# Patient Record
Sex: FEMALE | Race: WHITE | Employment: OTHER | URBAN - METROPOLITAN AREA
[De-identification: names, ages, dates, MRNs, and addresses within clinical notes are randomized per-mention and may not be internally consistent; named-entity substitution may affect disease eponyms.]

---

## 2021-09-02 ENCOUNTER — APPOINTMENT (OUTPATIENT)
Dept: NON INVASIVE DIAGNOSTICS | Age: 71
DRG: 189 | End: 2021-09-02
Attending: EMERGENCY MEDICINE
Payer: MEDICARE

## 2021-09-02 ENCOUNTER — APPOINTMENT (OUTPATIENT)
Dept: NUCLEAR MEDICINE | Age: 71
DRG: 189 | End: 2021-09-02
Attending: EMERGENCY MEDICINE
Payer: MEDICARE

## 2021-09-02 ENCOUNTER — APPOINTMENT (OUTPATIENT)
Dept: CT IMAGING | Age: 71
DRG: 189 | End: 2021-09-02
Attending: EMERGENCY MEDICINE
Payer: MEDICARE

## 2021-09-02 ENCOUNTER — APPOINTMENT (OUTPATIENT)
Dept: GENERAL RADIOLOGY | Age: 71
DRG: 189 | End: 2021-09-02
Attending: EMERGENCY MEDICINE
Payer: MEDICARE

## 2021-09-02 ENCOUNTER — HOSPITAL ENCOUNTER (INPATIENT)
Age: 71
LOS: 6 days | Discharge: HOME HEALTH | DRG: 189 | End: 2021-09-08
Attending: EMERGENCY MEDICINE | Admitting: HOSPITALIST
Payer: MEDICARE

## 2021-09-02 DIAGNOSIS — J45.901 ACUTE EXACERBATION OF COPD WITH ASTHMA (HCC): Primary | ICD-10-CM

## 2021-09-02 DIAGNOSIS — J44.1 ACUTE EXACERBATION OF COPD WITH ASTHMA (HCC): Primary | ICD-10-CM

## 2021-09-02 DIAGNOSIS — R91.8 PULMONARY NODULES: ICD-10-CM

## 2021-09-02 DIAGNOSIS — E87.1 HYPONATREMIA: ICD-10-CM

## 2021-09-02 PROBLEM — Z72.0 TOBACCO USE: Status: ACTIVE | Noted: 2021-09-02

## 2021-09-02 PROBLEM — K80.20 CHOLELITHIASIS: Status: ACTIVE | Noted: 2021-09-02

## 2021-09-02 PROBLEM — J96.01 ACUTE RESPIRATORY FAILURE WITH HYPOXIA (HCC): Status: ACTIVE | Noted: 2021-09-02

## 2021-09-02 LAB
ALBUMIN SERPL-MCNC: 4.2 G/DL (ref 3.4–5)
ALBUMIN/GLOB SERPL: 1.1 {RATIO} (ref 0.8–1.7)
ALP SERPL-CCNC: 103 U/L (ref 45–117)
ALT SERPL-CCNC: 23 U/L (ref 13–56)
AMPHET UR QL SCN: NEGATIVE
ANION GAP SERPL CALC-SCNC: 10 MMOL/L (ref 3–18)
AST SERPL-CCNC: 30 U/L (ref 10–38)
ATRIAL RATE: 117 BPM
BARBITURATES UR QL SCN: NEGATIVE
BASE DEFICIT BLD-SCNC: 0.1 MMOL/L
BASOPHILS # BLD: 0.1 K/UL (ref 0–0.1)
BASOPHILS NFR BLD: 1 % (ref 0–2)
BDY SITE: ABNORMAL
BENZODIAZ UR QL: NEGATIVE
BILIRUB SERPL-MCNC: 0.7 MG/DL (ref 0.2–1)
BNP SERPL-MCNC: 1158 PG/ML (ref 0–900)
BUN SERPL-MCNC: 10 MG/DL (ref 7–18)
BUN/CREAT SERPL: 9 (ref 12–20)
CALCIUM SERPL-MCNC: 9.8 MG/DL (ref 8.5–10.1)
CALCULATED P AXIS, ECG09: 83 DEGREES
CALCULATED R AXIS, ECG10: 78 DEGREES
CALCULATED T AXIS, ECG11: 54 DEGREES
CANNABINOIDS UR QL SCN: NEGATIVE
CHLORIDE SERPL-SCNC: 87 MMOL/L (ref 100–111)
CK MB CFR SERPL CALC: 4.6 % (ref 0–4)
CK MB SERPL-MCNC: 8.7 NG/ML (ref 5–25)
CK SERPL-CCNC: 191 U/L (ref 26–192)
CO2 SERPL-SCNC: 27 MMOL/L (ref 21–32)
COCAINE UR QL SCN: NEGATIVE
COVID-19 RAPID TEST, COVR: NOT DETECTED
CREAT SERPL-MCNC: 1.11 MG/DL (ref 0.6–1.3)
D DIMER PPP FEU-MCNC: 0.89 UG/ML(FEU)
DIAGNOSIS, 93000: NORMAL
DIFFERENTIAL METHOD BLD: ABNORMAL
ECHO AV ANNULUS DIAM: 3.16 CM
ECHO AV AREA PEAK VELOCITY: 2.2 CM2
ECHO AV AREA VTI: 2.5 CM2
ECHO AV AREA/BSA PEAK VELOCITY: 1.2 CM2/M2
ECHO AV AREA/BSA VTI: 1.4 CM2/M2
ECHO AV MEAN GRADIENT: 11.84 MMHG
ECHO AV PEAK GRADIENT: 22.35 MMHG
ECHO AV PEAK VELOCITY: 235 CM/S
ECHO AV VTI: 37.98 CM
ECHO IVC PROX: 1.7 CM
ECHO LA VOL 2C: 45.67 ML (ref 22–52)
ECHO LA VOL 4C: 17.98 ML (ref 22–52)
ECHO LA VOL BP: 33.6 ML (ref 22–52)
ECHO LV EDV A2C: 33.35 ML
ECHO LV EDV A4C: 56.08 ML
ECHO LV EDV BP: 43.47 ML (ref 56–104)
ECHO LV EJECTION FRACTION A2C: 47 %
ECHO LV EJECTION FRACTION A4C: 73 %
ECHO LV EJECTION FRACTION BIPLANE: 64 % (ref 55–100)
ECHO LV ESV A2C: 17.71 ML
ECHO LV ESV A4C: 14.89 ML
ECHO LV ESV BP: 15.65 ML (ref 19–49)
ECHO LV INTERNAL DIMENSION DIASTOLIC: 3.33 CM (ref 3.9–5.3)
ECHO LV INTERNAL DIMENSION SYSTOLIC: 2.36 CM
ECHO LV IVSD: 1.27 CM (ref 0.6–0.9)
ECHO LV MASS 2D: 96.6 G (ref 67–162)
ECHO LV MASS INDEX 2D: 52.5 G/M2 (ref 43–95)
ECHO LV POSTERIOR WALL DIASTOLIC: 0.74 CM (ref 0.6–0.9)
ECHO LVOT CARDIAC OUTPUT: 11.62 L/MIN
ECHO LVOT DIAM: 1.97 CM
ECHO LVOT PEAK GRADIENT: 11.63 MMHG
ECHO LVOT PEAK VELOCITY: 171 CM/S
ECHO LVOT SV: 15.7 ML
ECHO LVOT SV: 94.8 ML
ECHO LVOT VTI: 31.19 CM
ECHO RA AREA 4C: 7.71 CM2
ECHO RV INTERNAL DIMENSION: 2.48 CM
EOSINOPHIL # BLD: 0.8 K/UL (ref 0–0.4)
EOSINOPHIL NFR BLD: 12 % (ref 0–5)
ERYTHROCYTE [DISTWIDTH] IN BLOOD BY AUTOMATED COUNT: 11.9 % (ref 11.6–14.5)
GAS FLOW.O2 O2 DELIVERY SYS: ABNORMAL L/MIN
GLOBULIN SER CALC-MCNC: 3.9 G/DL (ref 2–4)
GLUCOSE SERPL-MCNC: 119 MG/DL (ref 74–99)
HCO3 BLD-SCNC: 24.1 MMOL/L (ref 22–26)
HCT VFR BLD AUTO: 36.3 % (ref 35–45)
HDSCOM,HDSCOM: NORMAL
HGB BLD-MCNC: 13.1 G/DL (ref 12–16)
LACTATE BLD-SCNC: 1.53 MMOL/L (ref 0.4–2)
LVOT MG: 7.17 MMHG
LYMPHOCYTES # BLD: 1.5 K/UL (ref 0.9–3.6)
LYMPHOCYTES NFR BLD: 23 % (ref 21–52)
MAGNESIUM SERPL-MCNC: 1.7 MG/DL (ref 1.6–2.6)
MCH RBC QN AUTO: 32.3 PG (ref 24–34)
MCHC RBC AUTO-ENTMCNC: 36.1 G/DL (ref 31–37)
MCV RBC AUTO: 89.6 FL (ref 78–100)
METHADONE UR QL: NEGATIVE
MONOCYTES # BLD: 0.4 K/UL (ref 0.05–1.2)
MONOCYTES NFR BLD: 7 % (ref 3–10)
NEUTS SEG # BLD: 3.7 K/UL (ref 1.8–8)
NEUTS SEG NFR BLD: 57 % (ref 40–73)
OPIATES UR QL: NEGATIVE
P-R INTERVAL, ECG05: 130 MS
PCO2 BLD: 37 MMHG (ref 35–45)
PCP UR QL: NEGATIVE
PH BLD: 7.42 [PH] (ref 7.35–7.45)
PLATELET # BLD AUTO: 214 K/UL (ref 135–420)
PMV BLD AUTO: 11.1 FL (ref 9.2–11.8)
PO2 BLD: 70 MMHG (ref 80–100)
POTASSIUM SERPL-SCNC: 3.9 MMOL/L (ref 3.5–5.5)
PROT SERPL-MCNC: 8.1 G/DL (ref 6.4–8.2)
Q-T INTERVAL, ECG07: 346 MS
QRS DURATION, ECG06: 116 MS
QTC CALCULATION (BEZET), ECG08: 482 MS
RBC # BLD AUTO: 4.05 M/UL (ref 4.2–5.3)
SAO2 % BLD: 94.2 % (ref 92–97)
SERVICE CMNT-IMP: ABNORMAL
SODIUM SERPL-SCNC: 124 MMOL/L (ref 136–145)
SOURCE, COVRS: NORMAL
SPECIMEN TYPE: ABNORMAL
TROPONIN I SERPL-MCNC: <0.02 NG/ML (ref 0–0.04)
VENTRICULAR RATE, ECG03: 117 BPM
WBC # BLD AUTO: 6.5 K/UL (ref 4.6–13.2)

## 2021-09-02 PROCEDURE — 82553 CREATINE MB FRACTION: CPT

## 2021-09-02 PROCEDURE — 80307 DRUG TEST PRSMV CHEM ANLYZR: CPT

## 2021-09-02 PROCEDURE — 96375 TX/PRO/DX INJ NEW DRUG ADDON: CPT

## 2021-09-02 PROCEDURE — 96374 THER/PROPH/DIAG INJ IV PUSH: CPT

## 2021-09-02 PROCEDURE — 71045 X-RAY EXAM CHEST 1 VIEW: CPT

## 2021-09-02 PROCEDURE — 87635 SARS-COV-2 COVID-19 AMP PRB: CPT

## 2021-09-02 PROCEDURE — C8929 TTE W OR WO FOL WCON,DOPPLER: HCPCS

## 2021-09-02 PROCEDURE — 80053 COMPREHEN METABOLIC PANEL: CPT

## 2021-09-02 PROCEDURE — 85025 COMPLETE CBC W/AUTO DIFF WBC: CPT

## 2021-09-02 PROCEDURE — 74011000250 HC RX REV CODE- 250: Performed by: HOSPITALIST

## 2021-09-02 PROCEDURE — 74011000258 HC RX REV CODE- 258: Performed by: EMERGENCY MEDICINE

## 2021-09-02 PROCEDURE — 94640 AIRWAY INHALATION TREATMENT: CPT

## 2021-09-02 PROCEDURE — 99285 EMERGENCY DEPT VISIT HI MDM: CPT

## 2021-09-02 PROCEDURE — 74011250637 HC RX REV CODE- 250/637: Performed by: EMERGENCY MEDICINE

## 2021-09-02 PROCEDURE — 85379 FIBRIN DEGRADATION QUANT: CPT

## 2021-09-02 PROCEDURE — 36600 WITHDRAWAL OF ARTERIAL BLOOD: CPT

## 2021-09-02 PROCEDURE — 71250 CT THORAX DX C-: CPT

## 2021-09-02 PROCEDURE — 96366 THER/PROPH/DIAG IV INF ADDON: CPT

## 2021-09-02 PROCEDURE — 93005 ELECTROCARDIOGRAM TRACING: CPT

## 2021-09-02 PROCEDURE — 87040 BLOOD CULTURE FOR BACTERIA: CPT

## 2021-09-02 PROCEDURE — 83735 ASSAY OF MAGNESIUM: CPT

## 2021-09-02 PROCEDURE — 96365 THER/PROPH/DIAG IV INF INIT: CPT

## 2021-09-02 PROCEDURE — 74011250636 HC RX REV CODE- 250/636: Performed by: HOSPITALIST

## 2021-09-02 PROCEDURE — 74011000250 HC RX REV CODE- 250: Performed by: EMERGENCY MEDICINE

## 2021-09-02 PROCEDURE — 74011250637 HC RX REV CODE- 250/637: Performed by: FAMILY MEDICINE

## 2021-09-02 PROCEDURE — 83605 ASSAY OF LACTIC ACID: CPT

## 2021-09-02 PROCEDURE — 74011250636 HC RX REV CODE- 250/636: Performed by: EMERGENCY MEDICINE

## 2021-09-02 PROCEDURE — 74011000636 HC RX REV CODE- 636: Performed by: EMERGENCY MEDICINE

## 2021-09-02 PROCEDURE — 74011250637 HC RX REV CODE- 250/637: Performed by: INTERNAL MEDICINE

## 2021-09-02 PROCEDURE — 65660000000 HC RM CCU STEPDOWN

## 2021-09-02 PROCEDURE — 96367 TX/PROPH/DG ADDL SEQ IV INF: CPT

## 2021-09-02 PROCEDURE — 94762 N-INVAS EAR/PLS OXIMTRY CONT: CPT

## 2021-09-02 PROCEDURE — A9540 TC99M MAA: HCPCS

## 2021-09-02 PROCEDURE — 82803 BLOOD GASES ANY COMBINATION: CPT

## 2021-09-02 PROCEDURE — 83880 ASSAY OF NATRIURETIC PEPTIDE: CPT

## 2021-09-02 PROCEDURE — 74011250637 HC RX REV CODE- 250/637: Performed by: HOSPITALIST

## 2021-09-02 PROCEDURE — 84300 ASSAY OF URINE SODIUM: CPT

## 2021-09-02 RX ORDER — BUDESONIDE 0.5 MG/2ML
500 INHALANT ORAL
Status: DISCONTINUED | OUTPATIENT
Start: 2021-09-02 | End: 2021-09-08 | Stop reason: HOSPADM

## 2021-09-02 RX ORDER — SODIUM CHLORIDE 0.9 % (FLUSH) 0.9 %
5-40 SYRINGE (ML) INJECTION AS NEEDED
Status: DISCONTINUED | OUTPATIENT
Start: 2021-09-02 | End: 2021-09-08 | Stop reason: HOSPADM

## 2021-09-02 RX ORDER — MAGNESIUM SULFATE HEPTAHYDRATE 40 MG/ML
2 INJECTION, SOLUTION INTRAVENOUS ONCE
Status: COMPLETED | OUTPATIENT
Start: 2021-09-02 | End: 2021-09-02

## 2021-09-02 RX ORDER — ACETAMINOPHEN 325 MG/1
650 TABLET ORAL
Status: DISCONTINUED | OUTPATIENT
Start: 2021-09-02 | End: 2021-09-08 | Stop reason: HOSPADM

## 2021-09-02 RX ORDER — LEVALBUTEROL INHALATION SOLUTION 0.63 MG/3ML
0.63 SOLUTION RESPIRATORY (INHALATION)
Status: DISCONTINUED | OUTPATIENT
Start: 2021-09-02 | End: 2021-09-08 | Stop reason: HOSPADM

## 2021-09-02 RX ORDER — IPRATROPIUM BROMIDE AND ALBUTEROL SULFATE 2.5; .5 MG/3ML; MG/3ML
3 SOLUTION RESPIRATORY (INHALATION)
Status: DISCONTINUED | OUTPATIENT
Start: 2021-09-02 | End: 2021-09-02

## 2021-09-02 RX ORDER — SODIUM CHLORIDE 9 MG/ML
75 INJECTION, SOLUTION INTRAVENOUS CONTINUOUS
Status: DISCONTINUED | OUTPATIENT
Start: 2021-09-02 | End: 2021-09-06

## 2021-09-02 RX ORDER — GUAIFENESIN 600 MG/1
600 TABLET, EXTENDED RELEASE ORAL EVERY 12 HOURS
Status: DISCONTINUED | OUTPATIENT
Start: 2021-09-02 | End: 2021-09-08 | Stop reason: HOSPADM

## 2021-09-02 RX ORDER — ENOXAPARIN SODIUM 100 MG/ML
40 INJECTION SUBCUTANEOUS EVERY 24 HOURS
Status: DISCONTINUED | OUTPATIENT
Start: 2021-09-02 | End: 2021-09-08 | Stop reason: HOSPADM

## 2021-09-02 RX ORDER — GUAIFENESIN/DEXTROMETHORPHAN 100-10MG/5
10 SYRUP ORAL
Status: COMPLETED | OUTPATIENT
Start: 2021-09-02 | End: 2021-09-02

## 2021-09-02 RX ORDER — IPRATROPIUM BROMIDE AND ALBUTEROL SULFATE 2.5; .5 MG/3ML; MG/3ML
6 SOLUTION RESPIRATORY (INHALATION)
Status: COMPLETED | OUTPATIENT
Start: 2021-09-02 | End: 2021-09-02

## 2021-09-02 RX ORDER — BENZONATATE 100 MG/1
200 CAPSULE ORAL
Status: COMPLETED | OUTPATIENT
Start: 2021-09-02 | End: 2021-09-02

## 2021-09-02 RX ORDER — SODIUM CHLORIDE 0.9 % (FLUSH) 0.9 %
5-40 SYRINGE (ML) INJECTION EVERY 8 HOURS
Status: DISCONTINUED | OUTPATIENT
Start: 2021-09-02 | End: 2021-09-08 | Stop reason: HOSPADM

## 2021-09-02 RX ORDER — IPRATROPIUM BROMIDE AND ALBUTEROL SULFATE 2.5; .5 MG/3ML; MG/3ML
3 SOLUTION RESPIRATORY (INHALATION)
Status: DISCONTINUED | OUTPATIENT
Start: 2021-09-02 | End: 2021-09-08 | Stop reason: HOSPADM

## 2021-09-02 RX ORDER — DILTIAZEM HYDROCHLORIDE 120 MG/1
120 CAPSULE, COATED, EXTENDED RELEASE ORAL DAILY
Status: DISCONTINUED | OUTPATIENT
Start: 2021-09-02 | End: 2021-09-05

## 2021-09-02 RX ORDER — SODIUM CHLORIDE, SODIUM LACTATE, POTASSIUM CHLORIDE, CALCIUM CHLORIDE 600; 310; 30; 20 MG/100ML; MG/100ML; MG/100ML; MG/100ML
200 INJECTION, SOLUTION INTRAVENOUS CONTINUOUS
Status: DISCONTINUED | OUTPATIENT
Start: 2021-09-02 | End: 2021-09-02

## 2021-09-02 RX ORDER — DIPHENHYDRAMINE HCL 25 MG
25 CAPSULE ORAL
Status: DISCONTINUED | OUTPATIENT
Start: 2021-09-02 | End: 2021-09-08 | Stop reason: HOSPADM

## 2021-09-02 RX ORDER — GUAIFENESIN 600 MG/1
600 TABLET, EXTENDED RELEASE ORAL
Status: COMPLETED | OUTPATIENT
Start: 2021-09-02 | End: 2021-09-02

## 2021-09-02 RX ORDER — ARFORMOTEROL TARTRATE 15 UG/2ML
15 SOLUTION RESPIRATORY (INHALATION)
Status: DISCONTINUED | OUTPATIENT
Start: 2021-09-02 | End: 2021-09-08 | Stop reason: HOSPADM

## 2021-09-02 RX ADMIN — BENZONATATE 200 MG: 100 CAPSULE ORAL at 11:05

## 2021-09-02 RX ADMIN — DOXYCYCLINE 100 MG: 100 INJECTION, POWDER, LYOPHILIZED, FOR SOLUTION INTRAVENOUS at 14:38

## 2021-09-02 RX ADMIN — SODIUM CHLORIDE 100 ML/HR: 900 INJECTION, SOLUTION INTRAVENOUS at 23:00

## 2021-09-02 RX ADMIN — ENOXAPARIN SODIUM 40 MG: 40 INJECTION SUBCUTANEOUS at 19:21

## 2021-09-02 RX ADMIN — BUDESONIDE 500 MCG: 0.5 INHALANT RESPIRATORY (INHALATION) at 20:33

## 2021-09-02 RX ADMIN — MAGNESIUM SULFATE HEPTAHYDRATE 2 G: 40 INJECTION, SOLUTION INTRAVENOUS at 09:46

## 2021-09-02 RX ADMIN — ACETAMINOPHEN 650 MG: 325 TABLET ORAL at 23:00

## 2021-09-02 RX ADMIN — CEFTRIAXONE 1 G: 1 INJECTION, POWDER, FOR SOLUTION INTRAMUSCULAR; INTRAVENOUS at 14:42

## 2021-09-02 RX ADMIN — DILTIAZEM HYDROCHLORIDE 120 MG: 120 CAPSULE, COATED, EXTENDED RELEASE ORAL at 19:20

## 2021-09-02 RX ADMIN — ARFORMOTEROL TARTRATE 15 MCG: 15 SOLUTION RESPIRATORY (INHALATION) at 20:10

## 2021-09-02 RX ADMIN — PERFLUTREN 1 ML: 6.52 INJECTION, SUSPENSION INTRAVENOUS at 17:23

## 2021-09-02 RX ADMIN — GUAIFENESIN 600 MG: 600 TABLET, EXTENDED RELEASE ORAL at 11:05

## 2021-09-02 RX ADMIN — SODIUM CHLORIDE, SODIUM LACTATE, POTASSIUM CHLORIDE, AND CALCIUM CHLORIDE 200 ML/HR: 600; 310; 30; 20 INJECTION, SOLUTION INTRAVENOUS at 17:14

## 2021-09-02 RX ADMIN — IPRATROPIUM BROMIDE AND ALBUTEROL SULFATE 6 ML: .5; 3 SOLUTION RESPIRATORY (INHALATION) at 09:27

## 2021-09-02 RX ADMIN — METHYLPREDNISOLONE SODIUM SUCCINATE 60 MG: 125 INJECTION, POWDER, FOR SOLUTION INTRAMUSCULAR; INTRAVENOUS at 20:10

## 2021-09-02 RX ADMIN — IPRATROPIUM BROMIDE AND ALBUTEROL SULFATE 3 ML: .5; 3 SOLUTION RESPIRATORY (INHALATION) at 13:29

## 2021-09-02 RX ADMIN — Medication 10 ML: at 23:01

## 2021-09-02 RX ADMIN — DIPHENHYDRAMINE HYDROCHLORIDE 25 MG: 25 CAPSULE ORAL at 23:00

## 2021-09-02 RX ADMIN — GUAIFENESIN AND DEXTROMETHORPHAN 10 ML: 100; 10 SYRUP ORAL at 12:18

## 2021-09-02 RX ADMIN — SODIUM CHLORIDE 1000 ML: 900 INJECTION, SOLUTION INTRAVENOUS at 10:14

## 2021-09-02 RX ADMIN — GUAIFENESIN 600 MG: 600 TABLET, EXTENDED RELEASE ORAL at 20:10

## 2021-09-02 NOTE — ED TRIAGE NOTES
Pt arrived via ems with c/o difficulty breathing since yesterday. Hx copd and asthma. Per ems pt received x2 albuterol neb tx and x1 atrovent neb tx. IV placed by ems to left ac. Wheezing on inspiration and expiration.  MD at bedside during triage and respiratory at the bedside

## 2021-09-02 NOTE — ED PROVIDER NOTES
[address]  EMERGENCY DEPARTMENT HISTORY AND PHYSICAL EXAM    9:08 AM    Date: 9/2/2021  Patient Name: Silver Tracy    History of Presenting Illness     Chief Complaint   Patient presents with    Breathing Problem     hx copd and asthma        History Provided By: Patient and Patient's Son  Location/Duration/Severity/Modifying factors   Patient is a 51-year-old female who presents to the emergency department with a chief complaint of shortness of breath. Reports that it started yesterday mostly gradual in onset. Progressed during the day and she called EMS. In route she received two albuterol and one Atrovent treatment, she received 125 mg of Solu-Medrol. She reports history of COPD and asthma. Denies any known sick contacts, has received both Covid vaccinations per for the full series. Patient found to be quite wheezy in route, per EMS was around 90% on their initial arrival to see her. She reports he is also had a nonproductive cough. Patient is in moderate respiratory distress, so history is somewhat limited based on that reason. She is not having any chest pain that she endorses denies any history of pulmonary embolism or any heart problems.           PCP: Tabby Tovar MD    Current Facility-Administered Medications   Medication Dose Route Frequency Provider Last Rate Last Admin    albuterol-ipratropium (DUO-NEB) 2.5 MG-0.5 MG/3 ML  3 mL Nebulization Q15MIN PRN Armando Dominguez K, DO   3 mL at 09/02/21 1329    cefTRIAXone (ROCEPHIN) 1 g in sterile water (preservative free) 10 mL IV syringe  1 g IntraVENous Q24H Armando Dominguez K, DO   1 g at 09/02/21 1442    sodium chloride (NS) flush 5-40 mL  5-40 mL IntraVENous Q8H Sultana Franklin MD   10 mL at 09/03/21 0512    sodium chloride (NS) flush 5-40 mL  5-40 mL IntraVENous PRN Fam Hammond MD        0.9% sodium chloride infusion  100 mL/hr IntraVENous CONTINUOUS Krysta CANTRELL  mL/hr at 09/02/21 2300 100 mL/hr at 09/02/21 2300    budesonide (PULMICORT) 500 mcg/2 ml nebulizer suspension  500 mcg Nebulization BID RT Esthela CANTRELL MD   500 mcg at 09/03/21 0753    methylPREDNISolone (PF) (Solu-MEDROL) injection 60 mg  60 mg IntraVENous Q8H Sultana Franklin MD   60 mg at 09/03/21 0512    enoxaparin (LOVENOX) injection 40 mg  40 mg SubCUTAneous Q24H Esthela CANTRELL MD   40 mg at 09/02/21 1921    arformoteroL (BROVANA) neb solution 15 mcg  15 mcg Nebulization BID RT Esthela CANTRELL MD   15 mcg at 09/03/21 0753    guaiFENesin ER (MUCINEX) tablet 600 mg  600 mg Oral Q12H AmSultana márquez MD   600 mg at 09/02/21 2010    levalbuterol (XOPENEX) nebulizer soln 0.63 mg/3 mL  0.63 mg Nebulization Q4H PRN Tg Bright MD        dilTIAZem ER (CARDIZEM CD) capsule 120 mg  120 mg Oral DAILY Jameel Rene MD   120 mg at 09/02/21 1920    acetaminophen (TYLENOL) tablet 650 mg  650 mg Oral Q6H PRN Cristal De MD   650 mg at 09/02/21 2300    diphenhydrAMINE (BENADRYL) capsule 25 mg  25 mg Oral QHS PRN Cristal De MD   25 mg at 09/02/21 2300       Past History     Past Medical History:  Past Medical History:   Diagnosis Date    Asthma     Chronic obstructive pulmonary disease ([facility] [address]     Hypertension        Past Surgical History:  History reviewed. No pertinent surgical history. Family History:  History reviewed. No pertinent family history. Social History:  Social History     Tobacco Use    Smoking status: Current Every Day Smoker     Packs/day: 0.50    Smokeless tobacco: Never Used   Substance Use Topics    Alcohol use: Not Currently    Drug use: Never       Allergies: Allergies   Allergen Reactions    Codeine Rash    Demerol [Meperidine] Rash    Sulfa (Sulfonamide Antibiotics) Rash       I reviewed and confirmed the above information with patient and updated as necessary. Review of Systems     Review of Systems   Constitutional: Negative for chills and fever.    HENT: Negative for congestion, rhinorrhea, sinus pressure and sneezing. Eyes: Negative for visual disturbance. Respiratory: Positive for cough, shortness of breath and wheezing. Cardiovascular: Negative for chest pain. Gastrointestinal: Negative for abdominal pain, diarrhea, nausea and vomiting. Genitourinary: Negative for dysuria, frequency and urgency. Musculoskeletal: Negative for back pain and neck pain. Skin: Negative for rash. Neurological: Negative for syncope, numbness and headaches. Physical Exam     Visit Vitals  BP (!) 140/69   Pulse 93   Temp 97.4 °F (36.3 °C)   Resp 20   Ht 5' 7\" (1.702 m)   Wt 72.6 kg (160 lb)   SpO2 100%   BMI 25.06 kg/m²       Physical Exam  Vitals and nursing note reviewed. Constitutional:       General: She is in acute distress. Appearance: Normal appearance. She is normal weight. HENT:      Head: Normocephalic and atraumatic. Right Ear: External ear normal.      Left Ear: External ear normal.      Nose: Nose normal.      Mouth/Throat:      Mouth: Mucous membranes are moist.   Eyes:      Conjunctiva/sclera: Conjunctivae normal.   Cardiovascular:      Rate and Rhythm: Regular rhythm. Tachycardia present. Pulses: Normal pulses. Heart sounds: Normal heart sounds. No murmur heard. Pulmonary:      Effort: Accessory muscle usage, prolonged expiration and respiratory distress (Mild to moderate) present. Breath sounds: Decreased air movement present. Examination of the right-upper field reveals wheezing. Examination of the left-upper field reveals wheezing. Examination of the right-middle field reveals wheezing. Examination of the left-middle field reveals wheezing. Examination of the right-lower field reveals decreased breath sounds and wheezing. Examination of the left-lower field reveals decreased breath sounds and wheezing. Decreased breath sounds and wheezing present. No rhonchi or rales. Abdominal:      General: Abdomen is flat. Tenderness: There is no abdominal tenderness. There is no guarding or rebound. Musculoskeletal:         General: No swelling or tenderness. Normal range of motion. Cervical back: Normal range of motion and neck supple. Right lower leg: No edema. Left lower leg: No edema. Skin:     General: Skin is warm and dry. Capillary Refill: Capillary refill takes less than 2 seconds. Findings: No rash. Neurological:      General: No focal deficit present. Mental Status: She is alert.          Diagnostic Study Results     Labs -  Recent Results (from the past 24 hour(s))   BLOOD GAS, ARTERIAL POC    Collection Time: 09/02/21  9:13 AM   Result Value Ref Range    Device: ROOM AIR      pH (POC) 7.42 7.35 - 7.45      pCO2 (POC) 37.0 35.0 - 45.0 MMHG    pO2 (POC) 70 (L) 80 - 100 MMHG    HCO3 (POC) 24.1 22 - 26 MMOL/L    sO2 (POC) 94.2 92 - 97 %    Base deficit (POC) 0.1 mmol/L    Site LEFT RADIAL      Specimen type (POC) ARTERIAL      Performed by Dylon Hall    COVID-19 RAPID TEST    Collection Time: 09/02/21  9:15 AM   Result Value Ref Range    Specimen source Nasopharyngeal      COVID-19 rapid test Not detected NOTD     EKG, 12 LEAD, INITIAL    Collection Time: 09/02/21  9:16 AM   Result Value Ref Range    Ventricular Rate 117 BPM    Atrial Rate 117 BPM    P-R Interval 130 ms    QRS Duration 116 ms    Q-T Interval 346 ms    QTC Calculation (Bezet) 482 ms    Calculated P Axis 83 degrees    Calculated R Axis 78 degrees    Calculated T Axis 54 degrees    Diagnosis       Sinus tachycardia with occasional premature ventricular complexes  Possible Left atrial enlargement  Incomplete right bundle branch block  Cannot exclude Lateral infarct , age undetermined  Abnormal ECG  No previous ECGs available  Confirmed by Patito Matson MD. ([id number]) on 9/2/2021 9:58:43 PM     CBC WITH AUTOMATED DIFF    Collection Time: 09/02/21  9:33 AM   Result Value Ref Range    WBC 6.5 4.6 - 13.2 K/uL    RBC 4.05 (L) 4.20 - 5.30 M/uL    HGB 13.1 12.0 - 16.0 g/dL    HCT 36.3 35.0 - 45.0 %    MCV 89.6 78.0 - 100.0 FL    MCH 32.3 24.0 - 34.0 PG    MCHC 36.1 31.0 - 37.0 g/dL    RDW 11.9 11.6 - 14.5 %    PLATELET 991 011 - 033 K/uL    MPV 11.1 9.2 - 11.8 FL    NEUTROPHILS 57 40 - 73 %    LYMPHOCYTES 23 21 - 52 %    MONOCYTES 7 3 - 10 %    EOSINOPHILS 12 (H) 0 - 5 %    BASOPHILS 1 0 - 2 %    ABS. NEUTROPHILS 3.7 1.8 - 8.0 K/UL    ABS. LYMPHOCYTES 1.5 0.9 - 3.6 K/UL    ABS. MONOCYTES 0.4 0.05 - 1.2 K/UL    ABS. EOSINOPHILS 0.8 (H) 0.0 - 0.4 K/UL    ABS. BASOPHILS 0.1 0.0 - 0.1 K/UL    DF AUTOMATED     METABOLIC PANEL, COMPREHENSIVE    Collection Time: 09/02/21  9:33 AM   Result Value Ref Range    Sodium 124 (L) 136 - 145 mmol/L    Potassium 3.9 3.5 - 5.5 mmol/L    Chloride 87 (L) 100 - 111 mmol/L    CO2 27 21 - 32 mmol/L    Anion gap 10 3.0 - 18 mmol/L    Glucose 119 (H) 74 - 99 mg/dL    BUN 10 7.0 - 18 MG/DL    Creatinine 1.11 0.6 - 1.3 MG/DL    BUN/Creatinine ratio 9 (L) 12 - 20      GFR est AA 59 (L) >60 ml/min/1.73m2    GFR est non-AA 49 (L) >60 ml/min/1.73m2    Calcium 9.8 8.5 - 10.1 MG/DL    Bilirubin, total 0.7 0.2 - 1.0 MG/DL    ALT (SGPT) 23 13 - 56 U/L    AST (SGOT) 30 10 - 38 U/L    Alk.  phosphatase 103 45 - 117 U/L    Protein, total 8.1 6.4 - 8.2 g/dL    Albumin 4.2 3.4 - 5.0 g/dL    Globulin 3.9 2.0 - 4.0 g/dL    A-G Ratio 1.1 0.8 - 1.7     NT-PRO BNP    Collection Time: 09/02/21  9:33 AM   Result Value Ref Range    NT pro-BNP 1,158 (H) 0 - 900 PG/ML   CARDIAC PANEL,(CK, CKMB & TROPONIN)    Collection Time: 09/02/21  9:33 AM   Result Value Ref Range    CK - MB 8.7 (H) <3.6 ng/ml    CK-MB Index 4.6 (H) 0.0 - 4.0 %     26 - 192 U/L    Troponin-I, QT <0.02 0.0 - 0.045 NG/ML   MAGNESIUM    Collection Time: 09/02/21  9:33 AM   Result Value Ref Range    Magnesium 1.7 1.6 - 2.6 mg/dL   CULTURE, BLOOD    Collection Time: 09/02/21  9:33 AM    Specimen: Blood   Result Value Ref Range    Special Requests: NO SPECIAL REQUESTS Culture result: NO GROWTH AFTER 21 HOURS     CULTURE, BLOOD    Collection Time: 09/02/21  9:33 AM    Specimen: Blood   Result Value Ref Range    Special Requests: NO SPECIAL REQUESTS      Culture result: NO GROWTH AFTER 21 HOURS     D DIMER    Collection Time: 09/02/21  9:33 AM   Result Value Ref Range    D DIMER 0.89 (H) <0.46 ug/ml(FEU)   DRUG SCREEN, URINE    Collection Time: 09/02/21  9:34 AM   Result Value Ref Range    BENZODIAZEPINES Negative NEG      BARBITURATES Negative NEG      THC (TH-CANNABINOL) Negative NEG      OPIATES Negative NEG      PCP(PHENCYCLIDINE) Negative NEG      COCAINE Negative NEG      AMPHETAMINES Negative NEG      METHADONE Negative NEG      HDSCOM (NOTE)    POC LACTIC ACID    Collection Time: 09/02/21  9:40 AM   Result Value Ref Range    Lactic Acid (POC) 1.53 0.40 - 2.00 mmol/L   ECHO ADULT COMPLETE    Collection Time: 09/02/21  5:43 PM   Result Value Ref Range    IVC proximal 1.70 cm    IVSd 1.27 (A) 0.60 - 0.90 cm    LVIDd 3.33 (A) 3.90 - 5.30 cm    LVIDs 2.36 cm    LVOT d 1.97 cm    LVPWd 0.74 0.60 - 0.90 cm    BP EF 64.0 55.0 - 100.0 %    LV Ejection Fraction MOD 2C 47 %    LV Ejection Fraction MOD 4C 73 %    LV ED Vol A2C 33.35 ml    LV ED Vol A4C 56.08 ml    LV ED Vol BP 43.47 (A) 56.0 - 104.0 ml    LV ES Vol A2C 17.71 ml    LV ES Vol A4C 14.89 ml    LV ES Vol BP 15.65 (A) 19.0 - 49.0 ml    LVOT SV 15.7 ml    LVOT Cardiac Output 11.62 l/min    LVOT Peak Gradient 11.63 mmHg    Left Ventricular Outflow Tract Mean Gradient 7.17 mmHg    LVOT SV 94.8 ml    LVOT Peak Velocity 171.00 cm/s    LVOT VTI 31.19 cm    RVIDd 2.48 cm    LA Volume 33.60 22.0 - 52.0 ml    LA Vol 2C 45.67 22.00 - 52.00 ml    LA Vol 4C 17.98 (A) 22.00 - 52.00 ml    Right Atrial Area 4C 7.71 cm2    AV Annulus 3.16 cm    Aortic Valve Area by Continuity of Peak Velocity 2.20 cm2    Aortic Valve Area by Continuity of VTI 2.50 cm2    AoV PG 22.35 mmHg    Aortic Valve Systolic Mean Gradient 35.83 mmHg    Aortic Valve Systolic Peak Velocity 571.59 cm/s    AoV VTI 37.98 cm    LV Mass AL 96.6 67.0 - 162.0 g    LV Mass AL Index 52.5 43.0 - 95.0 g/m2    ELVA/BSA [place] 1.2 cm2/m2    ELVA/BSA VTI 1.4 cm2/m2         Radiologic Studies -   NM LUNG SCAN PERF   Final Result   No discrete perfusional abnormality to suggest the presence of pulmonary   embolism. CT CHEST WO CONT   Final Result         1. Diffuse bronchial wall thickening and scattered foci of endobronchial mucoid   impaction with several faint centrilobular distribution nodules which are very   likely infectious or inflammatory in nature. 2. Several small additional pulmonary nodules as described above. Please see   below guidelines for follow-up. 3. Cholelithiasis.      ========      Fleischner Society pulmonary nodule guidelines (revised 2017): Multiple solid nodules <6 mm:   -Low risk for lung cancer: No follow-up. -High risk for lung cancer: Optional chest CT in 12 months. XR CHEST PORT   Final Result   Hyperinflation without acute cardiopulmonary disease. Medical Decision Making   I am the first provider for this patient. I reviewed the vital signs, available nursing notes, past medical history, past surgical history, family history and social history. Vital Signs-Reviewed the patient's vital signs. EKG: See ED course for my interpretation of EKG(s). Records Reviewed: Nursing Notes (Time of Review: 9:08 AM)    No available prior medical records    Provider Notes (Medical Decision Making):   MDM  Number of Diagnoses or Management Options  Acute exacerbation of COPD with asthma ([facility] [address]  Hyponatremia  Pulmonary nodules  Diagnosis management comments: 70-year-old female who presents to the ED with shortness of breath. Her clinical exam is highly suspicious for a COPD exacerbation given she is diffusely wheezy. She was treated with steroids already in route, and received two albuterol and one Atrovent treatment so far. Reports also history of asthma we will give her magnesium in addition to the continued breathing treatments. Would have him take a blood gas from her as the patient seems to be at least in moderate distress. She may need BiPAP although not requiring intubation at this point. Less likely PE, CHF, ACS, consider pneumothorax pleural effusion or pneumonia. Patient was demonstrating some mild improvement with neb treatments and steroids given prior to arrival seem to have improved her somewhat. She still quite tachycardic so D-dimer was ordered which was mildly positive. Patient went down for CTA of the chest however patient lost access at that time and unable to get the CTA. They recommended the tech to do a noncontrasted CT, over my concern for possibility of having a mass. Patient was longtime smoker and has some hyponatremia. Revealed no overt mass, pulmonary nodule seen as well as a signs of COPD exacerbation with bronchial wall thickening as well as some infectious component as well so antibiotics were initiated. Patient has a mildly elevated BNP, troponin not significantly elevated. Suspect primary ACS, her lung exam is highly suggestive of bronchospasm and inflammation so I think is most likely COPD exacerbation. Discussed with Dr. Stuart Cast of the cardiology service as well who ordered a stat echocardiogram of the patient. Patient was reassessed frequently, she did do a trial on CPAP which she did not tolerate due to frequent coughing episodes per the patient. She was placed back on nasal cannula and tolerated that without difficulty maintaining a saturation well. She did not seem to require positive pressure at this time as her CO2 is not elevated was primarily for her respiratory effort. Discussed with the patient and son regarding pulmonary nodules and follow-up after she leaves the hospital.    I gave the patient some IV fluids to try and improve her heart rate, cautiously.     Underwent VQ scan which basically excludes PE at this point. Patient will be admitted to the hospitalist service, discussed case with  of the hospitalist service who agrees to admit the patient. Patient will be admitted to the telemetry floor at this time. ED Course: Progress Notes, Reevaluation, and Consults:  ED Course as of Sep 03 0834   Thu Sep 02, 2021   1325 EKG interpretation of September 2, 2021, 7514. Sinus tach rate of 117 bpm, occasional PVCs are present. No apparent ST elevation or depression. There is some slight ST segment depression in leads V5 V6 suspect due to some demand ischemia. Overall sinus tachycardia with occasional ectopy no ST elevation. [CHRISTOPHER]      ED Course User Index  [CHRISTOPHER] Chilo Huggins,        Procedures    Critical Care Time: CRITICAL CARE NOTE:    I have spent 84 minutes of critical care time involved in lab review, consultations with specialist, family decision-making, and documentation. During this entire length of time I was immediately available to the patient. Critical Care: The reason for providing this level of medical care for this critically ill patient was due a critical illness that impaired one or more vital organ systems such that there was a high probability of imminent or life threatening deterioration in the patients condition. This care involved high complexity decision making to assess, manipulate, and support vital system functions, to treat this vital organ system failure and to prevent further life threatening deterioration of the patients condition. Time is exclusive of procedural and teaching time. Tierra Freitas DO      Core Measures:  For Hospitalized Patients:    1. Hospitalization Decision Time:  The decision to hospitalize the patient was made by Dr. Charisse Rosario at [phone] on 9/2/2021    2.  Aspirin: Aspirin was not given because the patient did not present with a stroke at the time of their Emergency Department evaluation    2:32 PM Patient is being admitted to the hospital by the hospitalist, Dr Brook Villareal. The results of their tests and reasons for their admission have been discussed with them and/or available family. They convey agreement and understanding for the need to be admitted and for their admission diagnosis. CONDITIONS ON ADMISSION:  Sepsis is not present at the time of admission. Deep Vein Thrombosis is not present at the time of admission. Thrombosis is not present at the time of admission. Urinary Tract Infection is not present at the time of admission. Pneumonia is present at the time of admission. MRSA is not present at the time of admission. Wound infection is not present at the time of admission. Pressure Ulcer is not present at the time of admission. CLINICAL IMPRESSION:    1. Acute exacerbation of COPD with asthma ([facility] [address]    2. Hyponatremia    3. Pulmonary nodules          Diagnosis     Clinical Impression:   1. Acute exacerbation of COPD with asthma ([facility] [address]    2. Hyponatremia    3. Pulmonary nodules        Disposition: Admit    Follow-up Information    None          Current Discharge Medication List      CONTINUE these medications which have NOT CHANGED    Details   metoprolol tartrate (LOPRESSOR) 50 mg tablet Take 50 mg by mouth two (2) times a day. telmisartan-hydroCHLOROthiazide (MICARDIS HCT) 80-25 mg per tablet Take 1 Tablet by mouth daily. amLODIPine (NORVASC) 5 mg tablet Take 5 mg by mouth daily. multivitamin (ONE A DAY) tablet Take 1 Tablet by mouth daily. Elda Ivey DO   Emergency Medicine   September 3, 2021, 9:08 AM     This note is dictated utilizing Dragon voice recognition software. Unfortunately this leads to occasional typographical errors using the voice recognition. I apologize in advance if the situation occurs. If questions occur please do not hesitate to contact me directly.     Elda Ivey, DO

## 2021-09-02 NOTE — H&P
History & Physical    Patient: Abby Tripp MRN: [id number]  CSN: [id number]    YOB: 1950  Age: 79 y.o. Sex: female      DOA: 9/2/2021  Primary Care Provider:  David Kidd MD      Assessment/Plan     Patient Active Problem List   Diagnosis Code    Hypertension I10    COPD exacerbation ([facility] J44.1    Hyponatremia E87.1    Cholelithiasis K80.20    COPD with acute exacerbation ([facility] [address] J44.1    Tobacco use Z72.0       Admit to medical floor    COPD exacerbation  Intravenous steroids. Pulmicort, brovana,   Duo nebs as needed. Empiric antibiotics. Mucolytics. If patient does not respond to the above measures will get ABG and will initiate NPPV. Hyponatremia -  Check urine sodium  Gentle IVF    HTN -  Reconcile home meds and start on home medications. Tobacco use-  Consult    Elevated NT proBNP-  Echo with EF of 65 to 70%    DVT prophylaxis with lovenox    Estimated length of stay : 2-3 days    CC: SOB       HPI:     Abby Tripp is a 79 y.o. female with COPD, HTN presents to ER with concerns of shortness of breath. Patient reported that she started having shortness of breath for the last  2 days. Since yesterday she has been getting progressively more short of breath. This morning she was significantly short of breath and called EMS. She received Solu-Medrol and neb treatment by EMS. Other symptoms include cough. She denies any fever, chills, chest pain, headache, nausea, vomiting. She denies any sick contacts. She has received Covid 19 vaccination. In ER she is noted to be wheezing, her sodium at 124, NT proBNP at 1158. Her CT chest showed diffuse bronchial wall thickening and scattered foci of endobronchial mucoid impaction with several faint central lobar distribution nodules infectious versus inflammatory in nature. Small other additional pulmonary nodules. Cholelithiasis.   VQ scan negative for PE    Past Medical History:   Diagnosis Date    Asthma     Chronic obstructive pulmonary disease ([facility] [address]     Hypertension        History reviewed. No pertinent surgical history. History reviewed. No pertinent family history. Social History     Socioeconomic History    Marital status:      Spouse name: Not on file    Number of children: Not on file    Years of education: Not on file    Highest education level: Not on file   Tobacco Use    Smoking status: Current Every Day Smoker     Packs/day: 0.50    Smokeless tobacco: Never Used   Substance and Sexual Activity    Alcohol use: Not Currently    Drug use: Never     Social Determinants of Health     Financial Resource Strain:     Difficulty of Paying Living Expenses:    Food Insecurity:     Worried About Running Out of Food in the Last Year:     [address] in the Last Year:    Transportation Needs:     Lack of Transportation (Medical):  Lack of Transportation (Non-Medical):    Physical Activity:     Days of Exercise per Week:     Minutes of Exercise per Session:    Stress:     Feeling of Stress :    Social Connections:     Frequency of Communication with Friends and Family:     Frequency of Social Gatherings with Friends and Family:     Attends [employer] witness Services:     Active Member of Clubs or Organizations:     Attends Club or Organization Meetings:     Marital Status:        Prior to Admission medications    Not on File       Allergies   Allergen Reactions    Codeine Rash    Demerol [Meperidine] Rash    Sulfa (Sulfonamide Antibiotics) Rash       Review of Systems  Gen: No fever, chills, malaise, weight loss/gain. Heent: No headache, rhinorrhea, epistaxis, ear pain, hearing loss, sinus pain, neck pain/stiffness, sore throat. Heart: No chest pain, palpitations, CUELLAR, pnd, or orthopnea. Resp: see above. GI: No nausea, vomiting, diarrhea, constipation, melena or hematochezia. : No urinary obstruction, dysuria or hematuria. Derm: No rash, new skin lesion or pruritis. Musc/skeletal: no bone or joint complains. Vasc: No edema, cyanosis or claudication. Endo: No heat/cold intolerance, no polyuria,polydipsia or polyphagia. Neuro: No unilateral weakness, numbness, tingling. No seizures. Heme: No easy bruising or bleeding. Physical Exam:     Physical Exam:  Visit Vitals  /82   Pulse (!) 117   Temp 98 °F (36.7 °C)   Resp 28   Ht 5' 7\" (1.702 m)   Wt 72.6 kg (160 lb)   SpO2 99%   BMI 25.06 kg/m²      O2 Device: None (Room air) (While transitioning to CPAP)    Temp (24hrs), Av °F (36.7 °C), Min:98 °F (36.7 °C), Max:98 °F (36.7 °C)    No intake/output data recorded. No intake/output data recorded. General:  Awake, cooperative, no distress. Head:  Normocephalic, without obvious abnormality, atraumatic. Eyes:  Conjunctivae/corneas clear, sclera anicteric, PERRL, EOMs intact. Nose: Nares normal. No drainage or sinus tenderness. Throat: Lips, mucosa, and tongue normal.    Neck: Supple, symmetrical, trachea midline, no adenopathy. Lungs:   Exp wheezes bilaterally. Heart:   S1, S2, no murmur, click, rub or gallop. Abdomen: Soft, non-tender. Bowel sounds normal. No masses,  No organomegaly. Extremities: Extremities normal, atraumatic, no cyanosis or edema. Capillary refill normal.   Pulses: 2+ and symmetric all extremities. Skin: Skin color pink, turgor normal. No rashes or lesions   Neurologic: CNII-XII intact. No focal motor or sensory deficit.        Labs Reviewed:    CMP:   Lab Results   Component Value Date/Time     (L) 2021 09:33 AM    K 3.9 2021 09:33 AM    CL 87 (L) 2021 09:33 AM    CO2 27 2021 09:33 AM    AGAP 10 2021 09:33 AM     (H) 2021 09:33 AM    BUN 10 2021 09:33 AM    CREA 1.11 2021 09:33 AM    GFRAA 59 (L) 2021 09:33 AM    GFRNA 49 (L) 2021 09:33 AM    CA 9.8 2021 09:33 AM    MG 1.7 2021 09:33 AM    ALB 4.2 2021 09:33 AM    TP 8.1 09/02/2021 09:33 AM    GLOB 3.9 09/02/2021 09:33 AM    AGRAT 1.1 09/02/2021 09:33 AM    ALT 23 09/02/2021 09:33 AM     CBC:   Lab Results   Component Value Date/Time    WBC 6.5 09/02/2021 09:33 AM    HGB 13.1 09/02/2021 09:33 AM    HCT 36.3 09/02/2021 09:33 AM     09/02/2021 09:33 AM     All Cardiac Markers in the last 24 hours:   Lab Results   Component Value Date/Time     09/02/2021 09:33 AM    CKMB 8.7 (H) 09/02/2021 09:33 AM    CKND1 4.6 (H) 09/02/2021 09:33 AM    TROIQ <0.02 09/02/2021 09:33 AM         Procedures/imaging: see electronic medical records for all procedures/Xrays and details which were not copied into this note but were reviewed prior to creation of Plan    Please note that this dictation was completed with "[employer] the [employer] voice recognition software. Quite often unanticipated grammatical, syntax, homophones, and other interpretive errors are inadvertently transcribed by the computer software. Please disregard these errors. Please excuse any errors that have escaped final proofreading.         CC: Jay Gustafson MD

## 2021-09-02 NOTE — CONSULTS
[facility] Consult Note      Patient: Leia Porras MRN: [id number]  SSN: [id number]    YOB: 1950  Age: 79 y.o. Sex: female        Date of Consultation: 9/2/2021  Referring Physician: Dr. Umm Pandya  Reason for Consultation: SOB and elevated proBNP    HPI:  I was asked by  to see this pleasant patient for shortness of breath and elevated proBNP. Leia Porras is a 79-year-old patient with known history of COPD/ asthma, chronic smoking history, hypertension, and previous history of alcohol dependence came to the hospital with worsening of shortness of breath cough and productive of sputum. patient was found to have mild proBNP elevation. Her symptoms are more consistent with COPD aggravation. Patient denied any fever. Patient denied any known history of heart disease except intermittent uncontrolled hypertension and recently have changes in her blood pressure medication. Patient denied any history of pulmonary embolism. No complaints of chest pains. Past Medical History:   Diagnosis Date    Asthma     Chronic obstructive pulmonary disease ([facility] [address]     Hypertension      History reviewed. No pertinent surgical history.   Current Facility-Administered Medications   Medication Dose Route Frequency    albuterol-ipratropium (DUO-NEB) 2.5 MG-0.5 MG/3 ML  3 mL Nebulization Q15MIN PRN    cefTRIAXone (ROCEPHIN) 1 g in sterile water (preservative free) 10 mL IV syringe  1 g IntraVENous Q24H    lactated Ringers infusion  200 mL/hr IntraVENous CONTINUOUS    sodium chloride (NS) flush 5-40 mL  5-40 mL IntraVENous Q8H    sodium chloride (NS) flush 5-40 mL  5-40 mL IntraVENous PRN    0.9% sodium chloride infusion  100 mL/hr IntraVENous CONTINUOUS    budesonide (PULMICORT) 500 mcg/2 ml nebulizer suspension  500 mcg Nebulization BID RT    methylPREDNISolone (PF) (Solu-MEDROL) injection 60 mg  60 mg IntraVENous Q8H    enoxaparin (LOVENOX) injection 40 mg  40 mg SubCUTAneous Q24H    arformoteroL (BROVANA) neb solution 15 mcg  15 mcg Nebulization BID RT    guaiFENesin ER (MUCINEX) tablet 600 mg  600 mg Oral Q12H    levalbuterol (XOPENEX) nebulizer soln 0.63 mg/3 mL  0.63 mg Nebulization Q4H PRN    dilTIAZem ER (CARDIZEM CD) capsule 120 mg  120 mg Oral DAILY     No current outpatient medications on file. Allergies and Intolerances: Allergies   Allergen Reactions    Codeine Rash    Demerol [Meperidine] Rash    Sulfa (Sulfonamide Antibiotics) Rash       Family History:   History reviewed. No pertinent family history. Social History:   She  reports that she has been smoking. She has been smoking about 0.50 packs per day. She has never used smokeless tobacco.  She  reports previous alcohol use. Review of Systems:     Review of Systems  Gen: No fever, chills, malaise, weight loss/gain. Heent: No headache, rhinorrhea, epistaxis, ear pain, hearing loss, sinus pain, neck pain/stiffness, sore throat. Heart: No chest pain, palpitations, CUELLAR, pnd, or orthopnea. Resp: No cough, hemoptysis, +wheezing and +shortness of breath. GI: No nausea, vomiting, diarrhea, constipation, melena or hematochezia. : No urinary obstruction, dysuria or hematuria. Derm: No rash, new skin lesion or pruritis. Musc/skeletal: no bone or joint complains. Vasc: No edema, cyanosis or claudication. Endo: No heat/cold intolerance, no polyuria,polydipsia or polyphagia. Neuro: No unilateral weakness, numbness, tingling. No seizures. Heme: No easy bruising or bleeding.         Physical:   Patient Vitals for the past 6 hrs:   Pulse Resp BP SpO2   09/02/21 1745 (!) 117 28  99 %   09/02/21 1743   135/82    09/02/21 1730 (!) 128 22 135/82 98 %   09/02/21 1700 (!) 127 27 (!) 153/89 97 %   09/02/21 1645 (!) 120 25  98 %   09/02/21 1630 (!) 118 24 138/88    09/02/21 1601 (!) 127 27  97 %   09/02/21 1600 (!) 132 28 (!) 172/89 92 %   09/02/21 1535 (!) 132 25  98 %   09/02/21 1533 (!) 129 30 (!) 152/75 100 % 09/02/21 1400 (!) 124 27  97 %         Exam:   General Appearance:   Patient is in mild respiratory distress  HEENT: GONZALO. HEAD: Atraumatic  NECK: No JVD, no thyroidomeglay. LUNGS: prolonged expiration with mild wheezing   HEART: S1+S2     ABD: Non-tender, BS Audible    EXT: No edema, and no cysnosis. VASCULAR EXAM: Pulses are intact. PSYCHIATRIC EXAM: Mood is appropriate. MUSCULOSKELETAL: Grossly no joint deformity. NEUROLOGICAL: Motor and sensory sytem intact and Cranial nerves II-XII intact. Review of Data:   LABS:   Lab Results   Component Value Date/Time    WBC 6.5 09/02/2021 09:33 AM    HGB 13.1 09/02/2021 09:33 AM    HCT 36.3 09/02/2021 09:33 AM    PLATELET 584 64/99/7046 09:33 AM     Lab Results   Component Value Date/Time    Sodium 124 (L) 09/02/2021 09:33 AM    Potassium 3.9 09/02/2021 09:33 AM    Chloride 87 (L) 09/02/2021 09:33 AM    CO2 27 09/02/2021 09:33 AM    Glucose 119 (H) 09/02/2021 09:33 AM    BUN 10 09/02/2021 09:33 AM    Creatinine 1.11 09/02/2021 09:33 AM     No results found for: CHOL, CHOLX, CHLST, CHOLV, HDL, HDLP, LDL, LDLC, DLDLP, TGLX, TRIGL, TRIGP  No components found for: GPT  No results found for: HBA1C, HOY6YESF, WSU6MREW    RADIOLOGY:  CT Results  (Last 48 hours)               09/02/21 1314  CT CHEST WO CONT Final result    Impression:          1. Diffuse bronchial wall thickening and scattered foci of endobronchial mucoid   impaction with several faint centrilobular distribution nodules which are very   likely infectious or inflammatory in nature. 2. Several small additional pulmonary nodules as described above. Please see   below guidelines for follow-up. 3. Cholelithiasis.       ========       Fleischner Society pulmonary nodule guidelines (revised 2017): Multiple solid nodules <6 mm:   -Low risk for lung cancer: No follow-up. -High risk for lung cancer: Optional chest CT in 12 months.             Narrative:  EXAM: CT Chest        INDICATION: Tachycardia and hypoxia. .       COMPARISON: Radiographs same day. No prior CT imaging available for   review/comparison. TECHNIQUE: Axial CT imaging from the thoracic inlet through the diaphragm   without        intravenous contrast. Multiplanar reformations were generated. One or more dose reduction techniques were used on this CT: automated exposure   control, adjustment of the mAs and/or kVp according to patient size, and   iterative reconstruction techniques. The specific techniques used on this CT   exam have been documented in the patient's electronic medical record. Digital   Imaging and Communications in Medicine (DICOM) format image data are available   to nonaffiliated external healthcare facilities or entities on a secure, media   free, reciprocally searchable basis with patient authorization for at least a   12-month period after this study. _______________       FINDINGS:       LUNGS:      > No alveolar consolidation. No significant groundglass abnormality or   abnormal septal line thickening.      > Small cluster of 2 to 3 mm subpleural pulmonary nodules within the   peripheral right lower lobe (image numbers [id number]). > 2 to 3 mm fissural nodule along the left major fissure (image 69). > 3 mm nodule posterior left upper lobe (image 34)      > 4 mm medial left upper lobe nodule (image 67)       PLEURA: Unremarkable. AIRWAY: Diffuse bronchial wall thickening is present with several scattered foci   of endobronchial mucoid impaction. MEDIASTINUM: Included thyroid gland is unremarkable. Cardiac size normal.   Multivessel coronary arterial atherosclerosis. Thoracic aorta is normal in   course/caliber with diffuse atherosclerotic calcifications. LYMPH NODES: No enlarged lymph nodes by size criteria. UPPER ABDOMEN: Excreted contrast is present within the renal collecting systems   from prior attempted PE protocol. Small gallstone within the gallbladder. OSSEOUS STRUCTURES: No acute or aggressive appearing osseous abnormality. Prominent Schmorl's node superior endplate K08.       OTHER:       _______________               CXR Results  (Last 48 hours)               09/02/21 1015  XR CHEST PORT Final result    Impression:  Hyperinflation without acute cardiopulmonary disease. Narrative:  EXAM:  PORTABLE CHEST       INDICATION:  Shortness of breath. TECHNIQUE:  Portable, AP view, 2 films. COMPARISON:  None.       ____________________       FINDINGS:         SUPPORT DEVICES: None. HEART AND MEDIASTINUM: Cardiomediastinal silhouette appears within normal   limits. Normal caliber thoracic aorta. LUNGS AND PLEURAL SPACES: Lungs are hyperinflated with no confluent airspace   opacity or pulmonary edema. No pleural effusion or pneumothorax. BONY THORAX AND SOFT TISSUES: No acute osseous abnormality.        ____________________                   Cardiology Procedures:   Results for orders placed or performed during the hospital encounter of 09/02/21   EKG, 12 LEAD, INITIAL   Result Value Ref Range    Ventricular Rate 117 BPM    Atrial Rate 117 BPM    P-R Interval 130 ms    QRS Duration 116 ms    Q-T Interval 346 ms    QTC Calculation (Bezet) 482 ms    Calculated P Axis 83 degrees    Calculated R Axis 78 degrees    Calculated T Axis 54 degrees    Diagnosis       Sinus tachycardia with occasional premature ventricular complexes  Possible Left atrial enlargement  Incomplete right bundle branch block  Septal infarct , age undetermined  Lateral infarct , age undetermined  Abnormal ECG  No previous ECGs available        Echo Results  (Last 48 hours)    None       Cardiolite (Tc-99m Sestamibi) stress test        Impression / Plan:    Patient Active Problem List   Diagnosis Code    Hypertension I10    COPD exacerbation ([facility] [address] J44.1    Hyponatremia E87.1    Cholelithiasis K80.20    COPD with acute exacerbation (HCC) J44.1    Acute respiratory failure with hypoxia ([facility] J96.01       Assessment and plan  Acute respiratory failure with hypoxia  Acute exacerbation of COPD  Hypertension        Plan:  EF is normal  echocardiogram done with technical limitation due to tachycardia and patient was unable to lie down flat. LV function is normal with hyperdynamic systolic function with mild LVH and mild gradient. unable to assess diastolic function. Unable to assess mitral valve M-mode and unable to comment on CHUCK.    Mild proBNP elevation is consistent with chronic hypertension and probably diastolic dysfunction. Recently patient have changes in her blood pressure medication. Patient is on telmisartan 80 mg/ 25 mg hydrochlorothiazide  Metoprolol tartrate 50 mg twice daily  Amlodipine 5 mg once a day    I will stop the amlodipine and start Cardizem CD 1 20 mg, continue with telmisartan/ HCTZ   Continue with metoprolol tartrate. Discussed with patient and family.       Signed By: Benito Arteaga MD     September 2, 2021

## 2021-09-02 NOTE — Clinical Note
Status[de-identified] INPATIENT [101]   Type of Bed: Telemetry [19]   Cardiac Monitoring Required?: Yes   Inpatient Hospitalization Certified Necessary for the Following Reasons: 3.  Patient receiving treatment that can only be provided in an inpatient setting (further clarification in H&P documentation)   Admitting Diagnosis: COPD with acute exacerbation [facility] [[id number]]   Admitting Diagnosis: Acute respiratory failure with hypoxia [facility] [[id number]]   Admitting Physician: Maryse Feng [[id number]]   Attending Physician: Maryse Feng [[id number]]   Estimated Length of Stay: 3-4 Midnights   Discharge Plan[de-identified] [facility]

## 2021-09-03 ENCOUNTER — APPOINTMENT (OUTPATIENT)
Dept: NON INVASIVE DIAGNOSTICS | Age: 71
DRG: 189 | End: 2021-09-03
Attending: INTERNAL MEDICINE
Payer: MEDICARE

## 2021-09-03 ENCOUNTER — HOSPITAL ENCOUNTER (INPATIENT)
Dept: VASCULAR SURGERY | Age: 71
Discharge: HOME | DRG: 189 | End: 2021-09-03
Attending: INTERNAL MEDICINE | Admitting: HOSPITALIST
Payer: MEDICARE

## 2021-09-03 ENCOUNTER — HOSPITAL ENCOUNTER (INPATIENT)
Dept: GENERAL RADIOLOGY | Age: 71
Discharge: HOME | DRG: 189 | End: 2021-09-03
Attending: INTERNAL MEDICINE | Admitting: HOSPITALIST
Payer: MEDICARE

## 2021-09-03 LAB
ANION GAP SERPL CALC-SCNC: 8 MMOL/L (ref 3–18)
BUN SERPL-MCNC: 12 MG/DL (ref 7–18)
BUN/CREAT SERPL: 12 (ref 12–20)
CALCIUM SERPL-MCNC: 9.3 MG/DL (ref 8.5–10.1)
CHLORIDE SERPL-SCNC: 90 MMOL/L (ref 100–111)
CO2 SERPL-SCNC: 26 MMOL/L (ref 21–32)
CREAT SERPL-MCNC: 0.98 MG/DL (ref 0.6–1.3)
ERYTHROCYTE [DISTWIDTH] IN BLOOD BY AUTOMATED COUNT: 12.3 % (ref 11.6–14.5)
GLUCOSE SERPL-MCNC: 129 MG/DL (ref 74–99)
HCT VFR BLD AUTO: 34.5 % (ref 35–45)
HGB BLD-MCNC: 11.7 G/DL (ref 12–16)
MCH RBC QN AUTO: 31.6 PG (ref 24–34)
MCHC RBC AUTO-ENTMCNC: 33.9 G/DL (ref 31–37)
MCV RBC AUTO: 93.2 FL (ref 78–100)
PLATELET # BLD AUTO: 212 K/UL (ref 135–420)
PMV BLD AUTO: 10.2 FL (ref 9.2–11.8)
POTASSIUM SERPL-SCNC: 4.3 MMOL/L (ref 3.5–5.5)
RBC # BLD AUTO: 3.7 M/UL (ref 4.2–5.3)
SODIUM SERPL-SCNC: 124 MMOL/L (ref 136–145)
SODIUM UR-SCNC: 64 MMOL/L (ref 20–110)
WBC # BLD AUTO: 6.5 K/UL (ref 4.6–13.2)

## 2021-09-03 PROCEDURE — 84300 ASSAY OF URINE SODIUM: CPT

## 2021-09-03 PROCEDURE — 94640 AIRWAY INHALATION TREATMENT: CPT

## 2021-09-03 PROCEDURE — 87449 NOS EACH ORGANISM AG IA: CPT

## 2021-09-03 PROCEDURE — 74011000250 HC RX REV CODE- 250: Performed by: EMERGENCY MEDICINE

## 2021-09-03 PROCEDURE — 74011000250 HC RX REV CODE- 250: Performed by: HOSPITALIST

## 2021-09-03 PROCEDURE — 74011250637 HC RX REV CODE- 250/637: Performed by: INTERNAL MEDICINE

## 2021-09-03 PROCEDURE — 80048 BASIC METABOLIC PNL TOTAL CA: CPT

## 2021-09-03 PROCEDURE — 74011250637 HC RX REV CODE- 250/637: Performed by: FAMILY MEDICINE

## 2021-09-03 PROCEDURE — 74011250636 HC RX REV CODE- 250/636: Performed by: HOSPITALIST

## 2021-09-03 PROCEDURE — 85027 COMPLETE CBC AUTOMATED: CPT

## 2021-09-03 PROCEDURE — 74011250636 HC RX REV CODE- 250/636: Performed by: EMERGENCY MEDICINE

## 2021-09-03 PROCEDURE — 74011000250 HC RX REV CODE- 250: Performed by: INTERNAL MEDICINE

## 2021-09-03 PROCEDURE — 71045 X-RAY EXAM CHEST 1 VIEW: CPT

## 2021-09-03 PROCEDURE — 36415 COLL VENOUS BLD VENIPUNCTURE: CPT

## 2021-09-03 PROCEDURE — 74011250637 HC RX REV CODE- 250/637: Performed by: HOSPITALIST

## 2021-09-03 PROCEDURE — 65660000000 HC RM CCU STEPDOWN

## 2021-09-03 PROCEDURE — 77010033678 HC OXYGEN DAILY

## 2021-09-03 PROCEDURE — 93970 EXTREMITY STUDY: CPT

## 2021-09-03 RX ORDER — BISMUTH SUBSALICYLATE 262 MG
1 TABLET,CHEWABLE ORAL DAILY
COMMUNITY

## 2021-09-03 RX ORDER — AMLODIPINE BESYLATE 5 MG/1
5 TABLET ORAL DAILY
COMMUNITY
End: 2021-09-08

## 2021-09-03 RX ORDER — IPRATROPIUM BROMIDE AND ALBUTEROL SULFATE 2.5; .5 MG/3ML; MG/3ML
3 SOLUTION RESPIRATORY (INHALATION) 3 TIMES DAILY
Status: DISCONTINUED | OUTPATIENT
Start: 2021-09-03 | End: 2021-09-04

## 2021-09-03 RX ORDER — METOPROLOL TARTRATE 50 MG/1
50 TABLET ORAL 2 TIMES DAILY
COMMUNITY

## 2021-09-03 RX ORDER — METOPROLOL TARTRATE 50 MG/1
50 TABLET ORAL 2 TIMES DAILY
Status: DISCONTINUED | OUTPATIENT
Start: 2021-09-03 | End: 2021-09-08 | Stop reason: HOSPADM

## 2021-09-03 RX ORDER — TELMISARTAN AND HYDROCHLORTHIAZIDE 80; 25 MG/1; MG/1
1 TABLET ORAL DAILY
COMMUNITY
End: 2021-09-08

## 2021-09-03 RX ADMIN — METOPROLOL TARTRATE 50 MG: 50 TABLET, FILM COATED ORAL at 20:42

## 2021-09-03 RX ADMIN — METOPROLOL TARTRATE 50 MG: 50 TABLET, FILM COATED ORAL at 13:18

## 2021-09-03 RX ADMIN — HYDROCHLOROTHIAZIDE: 25 TABLET ORAL at 13:18

## 2021-09-03 RX ADMIN — METHYLPREDNISOLONE SODIUM SUCCINATE 60 MG: 125 INJECTION, POWDER, FOR SOLUTION INTRAMUSCULAR; INTRAVENOUS at 21:48

## 2021-09-03 RX ADMIN — BUDESONIDE 500 MCG: 0.5 INHALANT RESPIRATORY (INHALATION) at 07:53

## 2021-09-03 RX ADMIN — DIPHENHYDRAMINE HYDROCHLORIDE 25 MG: 25 CAPSULE ORAL at 21:50

## 2021-09-03 RX ADMIN — ACETAMINOPHEN 650 MG: 325 TABLET ORAL at 21:50

## 2021-09-03 RX ADMIN — Medication 10 ML: at 05:12

## 2021-09-03 RX ADMIN — METHYLPREDNISOLONE SODIUM SUCCINATE 60 MG: 125 INJECTION, POWDER, FOR SOLUTION INTRAMUSCULAR; INTRAVENOUS at 13:18

## 2021-09-03 RX ADMIN — ARFORMOTEROL TARTRATE 15 MCG: 15 SOLUTION RESPIRATORY (INHALATION) at 21:26

## 2021-09-03 RX ADMIN — GUAIFENESIN 600 MG: 600 TABLET, EXTENDED RELEASE ORAL at 20:42

## 2021-09-03 RX ADMIN — ARFORMOTEROL TARTRATE 15 MCG: 15 SOLUTION RESPIRATORY (INHALATION) at 07:53

## 2021-09-03 RX ADMIN — Medication 10 ML: at 13:28

## 2021-09-03 RX ADMIN — ENOXAPARIN SODIUM 40 MG: 40 INJECTION SUBCUTANEOUS at 17:32

## 2021-09-03 RX ADMIN — DILTIAZEM HYDROCHLORIDE 120 MG: 120 CAPSULE, COATED, EXTENDED RELEASE ORAL at 09:54

## 2021-09-03 RX ADMIN — CEFTRIAXONE 1 G: 1 INJECTION, POWDER, FOR SOLUTION INTRAMUSCULAR; INTRAVENOUS at 13:18

## 2021-09-03 RX ADMIN — METHYLPREDNISOLONE SODIUM SUCCINATE 60 MG: 125 INJECTION, POWDER, FOR SOLUTION INTRAMUSCULAR; INTRAVENOUS at 05:12

## 2021-09-03 RX ADMIN — GUAIFENESIN 600 MG: 600 TABLET, EXTENDED RELEASE ORAL at 09:54

## 2021-09-03 RX ADMIN — BUDESONIDE 500 MCG: 0.5 INHALANT RESPIRATORY (INHALATION) at 21:27

## 2021-09-03 RX ADMIN — IPRATROPIUM BROMIDE AND ALBUTEROL SULFATE 3 ML: .5; 3 SOLUTION RESPIRATORY (INHALATION) at 17:14

## 2021-09-03 RX ADMIN — IPRATROPIUM BROMIDE AND ALBUTEROL SULFATE 3 ML: .5; 3 SOLUTION RESPIRATORY (INHALATION) at 21:26

## 2021-09-03 NOTE — PROGRESS NOTES
Hospitalist Progress Note    Patient: Leia Porras MRN: [id number]  CSN: [id number]    YOB: 1950  Age: 79 y.o. Sex: female    DOA: 9/2/2021 LOS:  LOS: 1 day                Assessment/Plan     Patient Active Problem List   Diagnosis Code    Hypertension I10    COPD exacerbation ([facility] J44.1    Hyponatremia E87.1    Cholelithiasis K80.20    COPD with acute exacerbation ([facility] J44.1    Tobacco use Z72.0        Chief complaint :  79 y.o. female with COPD, HTN presents to ER with concerns of shortness of breath. Breathing better, still with wheezing    COPD exacerbation -  Intravenous steroids. Pulmicort, brovana,   Duo nebs as needed. Empiric antibiotics. Mucolytics. Pulmonary consulted     Hyponatremia -  Check urine sodium  Gentle IVF     HTN -  Her amlodipine changed to cardizem  Continue with talmisartan-HCTZ and lopressor     Tobacco use-  Consult     Elevated NT proBNP-  Echo with EF of 65 to 70%    Disposition : 2-3 days    Review of systems  General: No fevers or chills. Cardiovascular: No chest pain or pressure. No palpitations. Pulmonary:see above  Gastrointestinal: No nausea, vomiting. Physical Exam:  General: Awake, cooperative, no acute distress    HEENT: NC, Atraumatic. PERRLA, anicteric sclerae. Lungs: Expiratory wheezes bilaterally. Heart:  S1 S2,  No murmur, No Rubs, No Gallops  Abdomen: Soft, Non distended, Non tender.  +Bowel sounds,   Extremities: No c/c/e  Psych:   Not anxious or agitated. Neurologic:  No acute neurological deficit. Vital signs/Intake and Output:  Visit Vitals  BP (!) 140/69   Pulse 93   Temp 97.4 °F (36.3 °C)   Resp 20   Ht 5' 7\" (1.702 m)   Wt 72.6 kg (160 lb)   SpO2 100%   BMI 25.06 kg/m²     Current Shift:  No intake/output data recorded. Last three shifts:  No intake/output data recorded.             Labs: Results:       Chemistry Recent Labs     09/02/21  0933   *   *   K 3.9   CL 87*   CO2 27   BUN 10 CREA 1.11   CA 9.8   AGAP 10   BUCR 9*      TP 8.1   ALB 4.2   GLOB 3.9   AGRAT 1.1      CBC w/Diff Recent Labs     09/03/21  0834 09/02/21  0933   WBC 6.5 6.5   RBC 3.70* 4.05*   HGB 11.7* 13.1   HCT 34.5* 36.3    214   GRANS  --  57   LYMPH  --  23   EOS  --  12*      Cardiac Enzymes Recent Labs     09/02/21  0933      CKND1 4.6*      Coagulation No results for input(s): PTP, INR, APTT, INREXT in the last 72 hours. Lipid Panel No results found for: CHOL, CHOLPOCT, CHOLX, CHLST, CHOLV, 148464, HDL, HDLP, LDL, LDLC, DLDLP, 023830, VLDLC, VLDL, TGLX, TRIGL, TRIGP, TGLPOCT, CHHD, CHHDX   BNP No results for input(s): BNPP in the last 72 hours.    Liver Enzymes Recent Labs     09/02/21  0933   TP 8.1   ALB 4.2         Thyroid Studies No results found for: T4, T3U, TSH, TSHEXT     Procedures/imaging: see electronic medical records for all procedures/Xrays and details which were not copied into this note but were reviewed prior to creation of Plan

## 2021-09-03 NOTE — PROGRESS NOTES
0710 Bedside and Verbal shift change report given to WESLEY Lion RN (oncoming nurse) by JOCELYN Cotto RN (offgoing nurse). Report included the following information SBAR, Kardex, Intake/Output, and MAR. Pt in bed, call light in reach. 0755 Pt assessed. No signs of acute distress. Pt in bed. 1130 Pt went down for bilat duplex    1714 Pt assessed. No signs of acute distress. Pt in bed. 2000 Bedside and Verbal shift change report given to IVANNA Squires RN (oncoming nurse) by Lizette Ramires. Amparo Lion RN (offgoing nurse). Report included the following information SBAR, Kardex, Intake/Output, MAR, and Recent Results. Pt in bed, call light in reach.

## 2021-09-03 NOTE — CONSULTS
[facility]  Pulmonary, Critical Care, and Sleep Medicine    Name: Tosin Terrazas MRN: [id number]   : 1950 Hospital: [facility] [place]    Date: 9/3/2021  Room: [address] [place] [address] Note                                              Consult requesting physician: Dr. Jose Manuel Whipple    Reason for Consult: acute hypoxemic respiratory failure, acute copd excerebration       IMPRESSION:     ·   Patient Active Problem List   Diagnosis Code    Hypertension I10    COPD exacerbation ([facility] [address] J44.1    Hyponatremia E87.1    Cholelithiasis K80.20    COPD with acute exacerbation ([facility] [address] J44.1    Tobacco use Z72.0 ·   hypoxemia       · Code status: Full code       RECOMMENDATIONS:   Respiratory: acute hypoxemic respiratory failure in the setting of acute COPD exacerbation. Now on NC2L  ABG  7.42/37/70/94  Ct of the chest on 2021  1. Diffuse bronchial wall thickening and scattered foci of endobronchial mucoid  impaction with several faint centrilobular distribution nodules which are very  likely infectious or inflammatory in nature. 2. Several small additional pulmonary nodules as described above. Please see  below guidelines for follow-up. 3. Cholelithiasis. V/Q scan on      IMPRESSION  No discrete perfusional abnormality to suggest the presence of pulmonary  embolism. At home only on DuoNeb. Smoker since age 15 still smoking half a pack a day previously up to 1 pack a pack a day about 50 packs history. Suspect at least moderate COPD. Moved here from [place]. No recent pulmonary function test or appropriate COPD therapy. Start Brovana and Pulmicort if tolerating well can switch to Symbicort for home. Patient had some cough after DuoNeb so we will switch to as needed. VQ scan negative for PE.  CT suggestive of chronic bronchitis bronchiectasis and newly developing pneumonia. PVL negative. Tachycardia echo pending. hao on cardizem          CT of the chest 2021  IMPRESSION    1. Diffuse bronchial wall thickening and scattered foci of endobronchial mucoid  impaction with several faint centrilobular distribution nodules which are very  likely infectious or inflammatory in nature. 2. Several small additional pulmonary nodules as described above. Please see  below guidelines for follow-up. 3. Cholelithiasis. Keep SPO2 >=92%. HOB 30 degree elevation all the time. Aggressive pulmonary toileting. Aspiration precautions. Incentive spirometry. CVS: Already on Cardizem Echo pending  ID: acute COPD exacerbation currently acquired pneumonia antibiotics for 5 days  Was vaccinated COVID-19 negative  Sepsis bundle and protocol followed. Follow serial lactic acid, frequent BMP check, fluid resuscitation. Follow cultures. Deescalate antibiotic when appropriate. Hematology/Oncology: White blood cells and hemoglobin  Renal: Improving, GUERRERO  GI/:prophylaxis  Endocrine: Monitor BS. SSI. Neurology: awake  Toxicology: none  Pain/Sedation: prn  Skin/Wound: none  Electrolytes: Replace electrolytes   IVF: prn    Prophylaxis: DVT Prophylaxis: SCD/yes GI Prophylaxis: Protonix/  Restraints: none    Lines/Tubes: PIV*Other:  PT/OT eval and treat. OOB. Advance Directive/Palliative Care: consulted    Will defer respective systems problem management to primary and other respective consultant and follow patient in ICU with primary and other medical team.  Further recommendations will be based on the patient's response to recommended treatment and results of the investigation ordered. Quality Care: PPI, DVT prophylaxis, HOB elevated, Infection control all reviewed and addressed. Care of plan d/w hospitalist team, RN, RT, MDR.  D/w patient and family (answered all questions to satisfaction). High complexity decision making was performed during the evaluation of this patient at high risk for decompensation with multiple organ involvement.              Subjective/History of Present Illness:     Patient is a 79 y.o. female with PMHx significant for COPD only on DuoNeb at home, hypertension, increased BMI current smoker for over 50 years half a pack a day previously 1 pack. Presents with 3 days of shortness of breath chest tightness and wheezing. Tachycardic on admission, VQ scan negative PVL negative for PE. No DVT. Hypoxemic respiratory failure. Currently improving on bronchodilators but has some cough afternoon DuoNeb. Suspect at least moderate COPD, possibly new developing pneumonia small infiltrate at the bases, chills,. Covid test negative also vaccinated for Covid. Acute COPD exacerbation with hypoxemic respiratory failure. 9/3/2021:  New monitoring patient on the floor add Brovana and Pulmicort if tolerates well I will switch tomorrow to Symbicort. Continue steroids. And antibiotics for COPD exacerbation add urine for Legionella and strep sputum cultures. Definitely will benefit from outpatient pulmonary function test and follow-up. Smoking cessation discussed. I/O last 24 hrs: Intake/Output Summary (Last 24 hours) at 9/3/2021 1033  Last data filed at 9/3/2021 0956  Gross per 24 hour   Intake 120 ml   Output    Net 120 ml         History taken from patient    Review of Systems:  A comprehensive review of systems was negative except for that written in the HPI.        Review of Systems:   HEENT: No epistaxis, no nasal drainage, no difficulty in swallowing, no redness in eyes  Respiratory: as above  Cardiovascular: no chest pain, no palpitations, no chronic leg edema, no syncope  Gastrointestinal: no abd pain, no vomiting, no diarrhea, no bleeding symptoms  Genitourinary: No urinary symptoms or hematuria  Musculoskeletal: Neg  Neurological: No focal weakness, no seizures, no headaches  Behvioral/Psych: No anxiety, no depression  General : No fever, no chills, no weight loss, no night sweats     Allergies   Allergen Reactions    Codeine Rash    Demerol [Meperidine] Rash    Sulfa (Sulfonamide Antibiotics) Rash      Past Medical History:   Diagnosis Date    Asthma     Chronic obstructive pulmonary disease ([facility] [address]     Hypertension       History reviewed. No pertinent surgical history. Social History     Tobacco Use    Smoking status: Current Every Day Smoker     Packs/day: 0.50    Smokeless tobacco: Never Used   Substance Use Topics    Alcohol use: Not Currently      History reviewed. No pertinent family history. Prior to Admission medications    Medication Sig Start Date End Date Taking? Authorizing Provider   metoprolol tartrate (LOPRESSOR) 50 mg tablet Take 50 mg by mouth two (2) times a day. Yes Provider, Historical   telmisartan-hydroCHLOROthiazide (MICARDIS HCT) 80-25 mg per tablet Take 1 Tablet by mouth daily. Yes Provider, Historical   amLODIPine (NORVASC) 5 mg tablet Take 5 mg by mouth daily. Yes Provider, Historical   multivitamin (ONE A DAY) tablet Take 1 Tablet by mouth daily.    Yes Provider, Historical     Current Facility-Administered Medications   Medication Dose Route Frequency    metoprolol tartrate (LOPRESSOR) tablet 50 mg  50 mg Oral BID    telmisartan/hydroCHLOROthiazide (MICARDIS HCT) 80/25 mg   Oral DAILY    ipratropium-albuterol (COMBIVENT RESPIMAT) 20 mcg-100 mcg inhalation spray  1 Puff Inhalation TID    cefTRIAXone (ROCEPHIN) 1 g in sterile water (preservative free) 10 mL IV syringe  1 g IntraVENous Q24H    sodium chloride (NS) flush 5-40 mL  5-40 mL IntraVENous Q8H    0.9% sodium chloride infusion  25 mL/hr IntraVENous CONTINUOUS    budesonide (PULMICORT) 500 mcg/2 ml nebulizer suspension  500 mcg Nebulization BID RT    methylPREDNISolone (PF) (Solu-MEDROL) injection 60 mg  60 mg IntraVENous Q8H    enoxaparin (LOVENOX) injection 40 mg  40 mg SubCUTAneous Q24H    arformoteroL (BROVANA) neb solution 15 mcg  15 mcg Nebulization BID RT    guaiFENesin ER (MUCINEX) tablet 600 mg  600 mg Oral Q12H    dilTIAZem ER (CARDIZEM CD) capsule 120 mg  120 mg Oral DAILY         Objective:   Vital Signs:    Visit Vitals  BP (!) 140/69   Pulse 93   Temp 97.4 °F (36.3 °C)   Resp 20   Ht 5' 7\" (1.702 m)   Wt 72.6 kg (160 lb)   SpO2 100%   BMI 25.06 kg/m²       O2 Device: Nasal cannula   O2 Flow Rate (L/min): 5 l/min   Temp (24hrs), Av.6 °F (36.4 °C), Min:96.8 °F (36 °C), Max:98 °F (36.7 °C)       Intake/Output:   Last shift:       0701 -  1900  In: 120 [P.O.:120]  Out: -     Last 3 shifts: No intake/output data recorded. Intake/Output Summary (Last 24 hours) at 9/3/2021 1033  Last data filed at 9/3/2021 0956  Gross per 24 hour   Intake 120 ml   Output    Net 120 ml       Last 3 Recorded Weights in this Encounter    21 0905 21 1743   Weight: 72.6 kg (160 lb) 72.6 kg (160 lb)             Recent Labs     21  0913   PHI 7.42   PCO2I 37.0   PO2I 70*   HCO3I 24.1       Physical Exam:     Impresson general : Alert, Awake, alert distress on minimal exertion  Head:   Normocephalic,atraumatic. ENT:   EOM  no scleral icterus, no pallor, no cyanosis. Nose:   No sinus tenderness  Throat:  Oropharynx ,mucosa, and tongue normal. No oral thrush. Neck:   Supple, symmetric. Lymph nodes. Trachea is line  Lung: Moderate air entry bilateral equal lateral rales. No rhonchi. Lateral extensive wheezing. No stridors. Yes prolongded expiration. yes accessory muscle use. Heart:   Regular cardia rate & rhythm. S1 S2 present. No murmur. No JVD. Abdomen:  Soft. NT. ND. +BS. No masses. liver  and spleen  Extremities:  No pedal edema. No cyanosis. No clubbing. Pulses: 2+ and symmetric in DP. Capillary refill: normal  Lymphatic:  neck and supraclavicular    Musculoskeletal: No joint swelling. No tenderness. Skin:   Lesion Color, texture, turgor normal. No rashes or lesions.        Data:       Recent Results (from the past 24 hour(s))   ECHO ADULT COMPLETE    Collection Time: 21  5:43 PM   Result Value Ref Range    IVC proximal 1.70 cm    IVSd 1.27 (A) 0.60 - 0.90 cm    LVIDd 3.33 (A) 3.90 - 5.30 cm    LVIDs 2.36 cm    LVOT d 1.97 cm    LVPWd 0.74 0.60 - 0.90 cm    BP EF 64.0 55.0 - 100.0 %    LV Ejection Fraction MOD 2C 47 %    LV Ejection Fraction MOD 4C 73 %    LV ED Vol A2C 33.35 ml    LV ED Vol A4C 56.08 ml    LV ED Vol BP 43.47 (A) 56.0 - 104.0 ml    LV ES Vol A2C 17.71 ml    LV ES Vol A4C 14.89 ml    LV ES Vol BP 15.65 (A) 19.0 - 49.0 ml    LVOT SV 15.7 ml    LVOT Cardiac Output 11.62 l/min    LVOT Peak Gradient 11.63 mmHg    Left Ventricular Outflow Tract Mean Gradient 7.17 mmHg    LVOT SV 94.8 ml    LVOT Peak Velocity 171.00 cm/s    LVOT VTI 31.19 cm    RVIDd 2.48 cm    LA Volume 33.60 22.0 - 52.0 ml    LA Vol 2C 45.67 22.00 - 52.00 ml    LA Vol 4C 17.98 (A) 22.00 - 52.00 ml    Right Atrial Area 4C 7.71 cm2    AV Annulus 3.16 cm    Aortic Valve Area by Continuity of Peak Velocity 2.20 cm2    Aortic Valve Area by Continuity of VTI 2.50 cm2    AoV PG 22.35 mmHg    Aortic Valve Systolic Mean Gradient 27.32 mmHg    Aortic Valve Systolic Peak Velocity 701.70 cm/s    AoV VTI 37.98 cm    LV Mass AL 96.6 67.0 - 162.0 g    LV Mass AL Index 52.5 43.0 - 95.0 g/m2    ELVA/BSA [place] 1.2 cm2/m2    ELVA/BSA VTI 1.4 SB9/I9   METABOLIC PANEL, BASIC    Collection Time: 09/03/21  8:34 AM   Result Value Ref Range    Sodium 124 (L) 136 - 145 mmol/L    Potassium 4.3 3.5 - 5.5 mmol/L    Chloride 90 (L) 100 - 111 mmol/L    CO2 26 21 - 32 mmol/L    Anion gap 8 3.0 - 18 mmol/L    Glucose 129 (H) 74 - 99 mg/dL    BUN 12 7.0 - 18 MG/DL    Creatinine 0.98 0.6 - 1.3 MG/DL    BUN/Creatinine ratio 12 12 - 20      GFR est AA >60 >60 ml/min/1.73m2    GFR est non-AA 56 (L) >60 ml/min/1.73m2    Calcium 9.3 8.5 - 10.1 MG/DL   CBC W/O DIFF    Collection Time: 09/03/21  8:34 AM   Result Value Ref Range    WBC 6.5 4.6 - 13.2 K/uL    RBC 3.70 (L) 4.20 - 5.30 M/uL    HGB 11.7 (L) 12.0 - 16.0 g/dL    HCT 34.5 (L) 35.0 - 45.0 %    MCV 93.2 78.0 - 100.0 FL    MCH 31.6 24.0 - 34.0 PG    MCHC 33.9 31.0 - 37.0 g/dL    RDW 12.3 11.6 - 14.5 %    PLATELET 388 949 - 194 K/uL    MPV 10.2 9.2 - 11.8 FL         Chemistry Recent Labs     09/03/21  0834 09/02/21  0933   * 119*   * 124*   K 4.3 3.9   CL 90* 87*   CO2 26 27   BUN 12 10   CREA 0.98 1.11   CA 9.3 9.8   MG  --  1.7   AGAP 8 10   BUCR 12 9*   AP  --  103   TP  --  8.1   ALB  --  4.2   GLOB  --  3.9   AGRAT  --  1.1        Lactic Acid No results found for: LAC  No results for input(s): LAC in the last 72 hours. Liver Enzymes Protein, total   Date Value Ref Range Status   09/02/2021 8.1 6.4 - 8.2 g/dL Final     Albumin   Date Value Ref Range Status   09/02/2021 4.2 3.4 - 5.0 g/dL Final     Globulin   Date Value Ref Range Status   09/02/2021 3.9 2.0 - 4.0 g/dL Final     A-G Ratio   Date Value Ref Range Status   09/02/2021 1.1 0.8 - 1.7   Final     Alk. phosphatase   Date Value Ref Range Status   09/02/2021 103 45 - 117 U/L Final     Recent Labs     09/02/21  0933   TP 8.1   ALB 4.2   GLOB 3.9   AGRAT 1.1           CBC w/Diff Recent Labs     09/03/21  0834 09/02/21  0933   WBC 6.5 6.5   RBC 3.70* 4.05*   HGB 11.7* 13.1   HCT 34.5* 36.3    214   GRANS  --  57   LYMPH  --  23   EOS  --  12*        Cardiac Enzymes No results found for: CPK, CK, CKMMB, CKMB, RCK3, CKMBT, CKNDX, CKND1, ABDIEL, TROPT, TROIQ, TRES, TROPT, TNIPOC, BNP, BNPP     BNP No results found for: BNP, BNPP, XBNPT     Coagulation No results for input(s): PTP, INR, APTT, INREXT in the last 72 hours.       Thyroid  No results found for: T4, T3U, TSH, TSHEXT    No results found for: T4     Urinalysis No results found for: COLOR, APPRN, SPGRU, REFSG, CANDE, PROTU, GLUCU, KETU, BILU, UROU, DERICK, LEUKU, GLUKE, EPSU, BACTU, WBCU, RBCU, CASTS, UCRY     Micro  Recent Labs     09/02/21 0933   CULT NO GROWTH AFTER 21 HOURS  NO GROWTH AFTER 21 HOURS     Recent Labs     09/02/21 0933   CULT NO GROWTH AFTER 21 HOURS  NO GROWTH AFTER 21 HOURS          Culture data during this hospitalization. All Micro Results     Procedure Component Value Units Date/Time    CULTURE, BLOOD [[id number]] Collected: 09/02/21 0933    Order Status: Completed Specimen: Blood Updated: 09/03/21 0743     Special Requests: NO SPECIAL REQUESTS        Culture result: NO GROWTH AFTER 21 HOURS       CULTURE, BLOOD [[id number]] Collected: 09/02/21 0933    Order Status: Completed Specimen: Blood Updated: 09/03/21 0743     Special Requests: NO SPECIAL REQUESTS        Culture result: NO GROWTH AFTER 21 HOURS       COVID-19 RAPID TEST [[id number]] Collected: 09/02/21 0915    Order Status: Completed Specimen: Nasopharyngeal Updated: 09/02/21 0949     Specimen source Nasopharyngeal        COVID-19 rapid test Not detected        Comment: Rapid Abbott ID Now       Rapid NAAT:  The specimen is NEGATIVE for SARS-CoV-2, the novel coronavirus associated with COVID-19. Negative results should be treated as presumptive and, if inconsistent with clinical signs and symptoms or necessary for patient management, should be tested with an alternative molecular assay. Negative results do not preclude SARS-CoV-2 infection and should not be used as the sole basis for patient management decisions. This test has been authorized by the FDA under an Emergency Use Authorization (EUA) for use by authorized laboratories. Fact sheet for Healthcare Providers: ConventionUpdate.co.nz  Fact sheet for Patients: ConventionUpdate.co.nz       Methodology: Isothermal Nucleic Acid Amplification                    PFT       Ultrasound       LE Doppler       ECHO       Images report reviewed by me:  CT (Most Recent) (CT chest reviewed by me) Results from Hospital Encounter encounter on 09/02/21    CT CHEST WO CONT    Narrative  EXAM: CT Chest    INDICATION: Tachycardia and hypoxia. .    COMPARISON: Radiographs same day. No prior CT imaging available for  review/comparison.     TECHNIQUE: Axial CT imaging from the thoracic inlet through the diaphragm  without    intravenous contrast. Multiplanar reformations were generated. One or more dose reduction techniques were used on this CT: automated exposure  control, adjustment of the mAs and/or kVp according to patient size, and  iterative reconstruction techniques. The specific techniques used on this CT  exam have been documented in the patient's electronic medical record. Digital  Imaging and Communications in Medicine (DICOM) format image data are available  to nonaffiliated external healthcare facilities or entities on a secure, media  free, reciprocally searchable basis with patient authorization for at least a  12-month period after this study. _______________    FINDINGS:    LUNGS:  > No alveolar consolidation. No significant groundglass abnormality or  abnormal septal line thickening.  > Small cluster of 2 to 3 mm subpleural pulmonary nodules within the  peripheral right lower lobe (image numbers [id number]). > 2 to 3 mm fissural nodule along the left major fissure (image 69). > 3 mm nodule posterior left upper lobe (image 34)  > 4 mm medial left upper lobe nodule (image 67)    PLEURA: Unremarkable. AIRWAY: Diffuse bronchial wall thickening is present with several scattered foci  of endobronchial mucoid impaction. MEDIASTINUM: Included thyroid gland is unremarkable. Cardiac size normal.  Multivessel coronary arterial atherosclerosis. Thoracic aorta is normal in  course/caliber with diffuse atherosclerotic calcifications. LYMPH NODES: No enlarged lymph nodes by size criteria. UPPER ABDOMEN: Excreted contrast is present within the renal collecting systems  from prior attempted PE protocol. Small gallstone within the gallbladder. OSSEOUS STRUCTURES: No acute or aggressive appearing osseous abnormality. Prominent Schmorl's node superior endplate O15.    OTHER:    _______________    Impression  1.  Diffuse bronchial wall thickening and scattered foci of endobronchial mucoid  impaction with several faint centrilobular distribution nodules which are very  likely infectious or inflammatory in nature. 2. Several small additional pulmonary nodules as described above. Please see  below guidelines for follow-up. 3. Cholelithiasis.    ========    Fleischner Society pulmonary nodule guidelines (revised 2017): Multiple solid nodules <6 mm:  -Low risk for lung cancer: No follow-up. -High risk for lung cancer: Optional chest CT in 12 months. CXR reviewed by me:  XR (Most Recent). CXR  reviewed by me and compared with previous CXR Results from Hospital Encounter encounter on 09/02/21    XR CHEST PORT    Narrative  EXAM:  PORTABLE CHEST    INDICATION:  Shortness of breath. TECHNIQUE:  Portable, AP view, 2 films. COMPARISON:  None.    ____________________    FINDINGS:    SUPPORT DEVICES: None. HEART AND MEDIASTINUM: Cardiomediastinal silhouette appears within normal  limits. Normal caliber thoracic aorta. LUNGS AND PLEURAL SPACES: Lungs are hyperinflated with no confluent airspace  opacity or pulmonary edema. No pleural effusion or pneumothorax. BONY THORAX AND SOFT TISSUES: No acute osseous abnormality. ____________________    Impression  Hyperinflation without acute cardiopulmonary disease.            Christi Houston MD  9/3/2021

## 2021-09-03 NOTE — PROGRESS NOTES
Cardiology Progress Note        Patient: Marvin         Sex: female          DOA: 2021  YOB: 1950      Age:  79 y.o.        LOS:  LOS: 1 day   Assessment/Plan     Principal Problem:    COPD with acute exacerbation ([facility] [address] (2021)    Active Problems:    COPD exacerbation ([facility] [address] ()      Hyponatremia (2021)      Cholelithiasis (2021)      Tobacco use (2021)        Plan:      Tachycardia  Shortness of breath  COPD      Hyperdynamic LV function    Patient is feeling better and tachycardia has improved  Continue with Cardizem CD  Continue with metoprolol  Continue with telmisartan/HCTZ  No further cardiac testing is suggested at this point  Patient may need repeat echocardiogram in outpatient setting when she will be able to lie down flat and when the heart rate is stable to assess LVOT and gradient. Discussed with patient. Subjective:    cc: Tachycardia  Shortness of breath  COPD        REVIEW OF SYSTEMS:     General: No fevers or chills. Cardiovascular: No chest pain or pressure. No palpitations. No ankle swelling  Pulmonary: No SOB, orthopnea, PND  Gastrointestinal: No nausea, vomiting or diarrhea      Objective:      Visit Vitals  BP (!) 146/82   Pulse 66   Temp 97.9 °F (36.6 °C)   Resp 20   Ht 5' 7\" (1.702 m)   Wt 72.6 kg (160 lb)   SpO2 100%   BMI 25.06 kg/m²     Body mass index is 25.06 kg/m². Physical Exam:  General Appearance: Comfortable, not using accessory muscles of respiration. NECK: No JVD, no thyroidomeglay. LUNGS: Clear bilaterally. HEART: S1+S2 audible,    ABD: Non-tender, BS Audible    EXT: No edema, and no cysnosis. VASCULAR EXAM: Pulses are intact. PSYCHIATRIC EXAM: Mood is appropriate.     Medication:  Current Facility-Administered Medications   Medication Dose Route Frequency    metoprolol tartrate (LOPRESSOR) tablet 50 mg  50 mg Oral BID    telmisartan/hydroCHLOROthiazide (MICARDIS HCT) 80/25 mg Oral DAILY    albuterol-ipratropium (DUO-NEB) 2.5 MG-0.5 MG/3 ML  3 mL Nebulization TID    albuterol-ipratropium (DUO-NEB) 2.5 MG-0.5 MG/3 ML  3 mL Nebulization Q15MIN PRN    cefTRIAXone (ROCEPHIN) 1 g in sterile water (preservative free) 10 mL IV syringe  1 g IntraVENous Q24H    sodium chloride (NS) flush 5-40 mL  5-40 mL IntraVENous Q8H    sodium chloride (NS) flush 5-40 mL  5-40 mL IntraVENous PRN    0.9% sodium chloride infusion  25 mL/hr IntraVENous CONTINUOUS    budesonide (PULMICORT) 500 mcg/2 ml nebulizer suspension  500 mcg Nebulization BID RT    methylPREDNISolone (PF) (Solu-MEDROL) injection 60 mg  60 mg IntraVENous Q8H    enoxaparin (LOVENOX) injection 40 mg  40 mg SubCUTAneous Q24H    arformoteroL (BROVANA) neb solution 15 mcg  15 mcg Nebulization BID RT    guaiFENesin ER (MUCINEX) tablet 600 mg  600 mg Oral Q12H    levalbuterol (XOPENEX) nebulizer soln 0.63 mg/3 mL  0.63 mg Nebulization Q4H PRN    dilTIAZem ER (CARDIZEM CD) capsule 120 mg  120 mg Oral DAILY    acetaminophen (TYLENOL) tablet 650 mg  650 mg Oral Q6H PRN    diphenhydrAMINE (BENADRYL) capsule 25 mg  25 mg Oral QHS PRN               Lab/Data Reviewed:  Procedures/imaging: see electronic medical records for all procedures/Xrays   and details which were not copied into this note but were reviewed prior to creation of Plan       All lab results for the last 24 hours reviewed. Recent Labs     09/03/21  0834 09/02/21  0933   WBC 6.5 6.5   HGB 11.7* 13.1   HCT 34.5* 36.3    214     Recent Labs     09/03/21  0834 09/02/21  0933   * 124*   K 4.3 3.9   CL 90* 87*   CO2 26 27   * 119*   BUN 12 10   CREA 0.98 1.11   CA 9.3 9.8       RADIOLOGY:  CT Results  (Last 48 hours)               09/02/21 1314  CT CHEST WO CONT Final result    Impression:          1.  Diffuse bronchial wall thickening and scattered foci of endobronchial mucoid   impaction with several faint centrilobular distribution nodules which are very likely infectious or inflammatory in nature. 2. Several small additional pulmonary nodules as described above. Please see   below guidelines for follow-up. 3. Cholelithiasis.       ========       Fleischner Society pulmonary nodule guidelines (revised 2017): Multiple solid nodules <6 mm:   -Low risk for lung cancer: No follow-up. -High risk for lung cancer: Optional chest CT in 12 months. Narrative:  EXAM: CT Chest        INDICATION: Tachycardia and hypoxia. .       COMPARISON: Radiographs same day. No prior CT imaging available for   review/comparison. TECHNIQUE: Axial CT imaging from the thoracic inlet through the diaphragm   without        intravenous contrast. Multiplanar reformations were generated. One or more dose reduction techniques were used on this CT: automated exposure   control, adjustment of the mAs and/or kVp according to patient size, and   iterative reconstruction techniques. The specific techniques used on this CT   exam have been documented in the patient's electronic medical record. Digital   Imaging and Communications in Medicine (DICOM) format image data are available   to nonaffiliated external healthcare facilities or entities on a secure, media   free, reciprocally searchable basis with patient authorization for at least a   12-month period after this study. _______________       FINDINGS:       LUNGS:      > No alveolar consolidation. No significant groundglass abnormality or   abnormal septal line thickening.      > Small cluster of 2 to 3 mm subpleural pulmonary nodules within the   peripheral right lower lobe (image numbers [id number]). > 2 to 3 mm fissural nodule along the left major fissure (image 69). > 3 mm nodule posterior left upper lobe (image 34)      > 4 mm medial left upper lobe nodule (image 67)       PLEURA: Unremarkable.        AIRWAY: Diffuse bronchial wall thickening is present with several scattered foci   of endobronchial mucoid impaction. MEDIASTINUM: Included thyroid gland is unremarkable. Cardiac size normal.   Multivessel coronary arterial atherosclerosis. Thoracic aorta is normal in   course/caliber with diffuse atherosclerotic calcifications. LYMPH NODES: No enlarged lymph nodes by size criteria. UPPER ABDOMEN: Excreted contrast is present within the renal collecting systems   from prior attempted PE protocol. Small gallstone within the gallbladder. OSSEOUS STRUCTURES: No acute or aggressive appearing osseous abnormality. Prominent Schmorl's node superior endplate M88.       OTHER:       _______________               CXR Results  (Last 48 hours)               09/03/21 1054  XR CHEST PORT Final result    Impression:  No significant change since 09/02/2021 without acute   cardiopulmonary disease. Narrative:  EXAM:  PORTABLE CHEST       INDICATION:  Shortness of breath. Cough. TECHNIQUE:  Portable, AP view. COMPARISON:  09/02/2021       ____________________       FINDINGS:         SUPPORT DEVICES: None. HEART AND MEDIASTINUM: Cardiomediastinal silhouette appears within normal   limits. Normal caliber thoracic aorta. LUNGS AND PLEURAL SPACES: Lungs are better inflated with no confluent airspace   opacity or pulmonary edema. No pleural effusion or pneumothorax. BONY THORAX AND SOFT TISSUES: No acute osseous abnormality. ____________________           09/02/21 1015  XR CHEST PORT Final result    Impression:  Hyperinflation without acute cardiopulmonary disease. Narrative:  EXAM:  PORTABLE CHEST       INDICATION:  Shortness of breath. TECHNIQUE:  Portable, AP view, 2 films. COMPARISON:  None.       ____________________       FINDINGS:         SUPPORT DEVICES: None. HEART AND MEDIASTINUM: Cardiomediastinal silhouette appears within normal   limits. Normal caliber thoracic aorta.        LUNGS AND PLEURAL SPACES: Lungs are hyperinflated with no confluent airspace   opacity or pulmonary edema. No pleural effusion or pneumothorax. BONY THORAX AND SOFT TISSUES: No acute osseous abnormality.        ____________________                   Cardiology Procedures:   Results for orders placed or performed during the hospital encounter of 09/02/21   EKG, 12 LEAD, INITIAL   Result Value Ref Range    Ventricular Rate 117 BPM    Atrial Rate 117 BPM    P-R Interval 130 ms    QRS Duration 116 ms    Q-T Interval 346 ms    QTC Calculation (Bezet) 482 ms    Calculated P Axis 83 degrees    Calculated R Axis 78 degrees    Calculated T Axis 54 degrees    Diagnosis       Sinus tachycardia with occasional premature ventricular complexes  Possible Left atrial enlargement  Incomplete right bundle branch block  Cannot exclude Lateral infarct , age undetermined  Abnormal ECG  No previous ECGs available  Confirmed by Ramirez Blunt MD. ([id number]) on 9/2/2021 9:58:43 PM        Echo Results  (Last 48 hours)    None       Cardiolite (Tc-99m Sestamibi) stress test    Signed By: Dea Dumont MD     September 3, 2021

## 2021-09-03 NOTE — PROGRESS NOTES
Assumed care of pt shortly after 2200. Pt A&Ox4, CUELLAR. Pt on 6L NC and sats 99%. Refuses to be weaned. Pt also wheezing and has been coughing persistently. Denies aspiration. Pt stated that it is from the [facility] tx that she recently got in the ED. Discussed this with Dr. Alec Dennis. IVF stopped per Dr. Eduardo Mejía order. Pt c/o right hip pain and requested tylenol and benadryl which she said she normally takes at home. Meds given. Pt's home meds to be updated this morning once pt's son contacted. Safety measures in place. Will continue to monitor.

## 2021-09-03 NOTE — PROGRESS NOTES
Pt's home medications list reviewed and verified. Paul Mcfarland updated about pt. All questions answered.

## 2021-09-03 NOTE — ACP (ADVANCE CARE PLANNING)
Advance Care Planning   Advance Care Planning Inpatient Note  [facility]    Today's Date: 9/3/2021  Unit: THE [address] CARDIAC/MEDICAL    Received request from admission screening. Upon review of chart and communication with care team, patient's decision making abilities are not in question. Patient was/were present in the room during visit. Goals of ACP Conversation:  Discuss Advance Care planning documents    Health Care Decision Makers:    No healthcare decision makers have been documented. Click here to complete [address] including selection of the Healthcare Decision Maker Relationship (ie \"Primary\")    Summary:  Documented Next of Kin, per patient report  Anthony Torres patient's son is named in patient's Medical Power of  as her spokesperson.   Advance Care Planning Documents (Patient Wishes) on file:  Healthcare Power of /Advance Directive appointment of Health care agent     Interventions:  Requested patient/family submit existing document(s) for our records: Healthcare Power of /Advance Directive appointment of Health care agent    Care Preferences Communicated:  No    Outcomes/Plan:  ACP Discussion Completed    Anjali Faulkner on 9/3/2021 at 10:58 AM

## 2021-09-03 NOTE — PROGRESS NOTES
Reason for Admission:  C/O SOB                     RUR Score:   11                 Plan for utilizing home health:    TBD      PCP: First and Last name:  Jesus Alberto Carreno MD     Name of Practice:    Are you a current patient: Yes/No:    Approximate date of last visit:    Can you participate in a virtual visit with your PCP:                     Current Advanced Directive/Advance Care Plan: Full Code      Healthcare Decision Maker:   Click here to complete [place] including selection of the Healthcare Decision Maker Relationship (ie \"Primary\")                             Transition of Care Plan:  Chart reviewed  Pt arrived by EMS from home with c/o SOB, hx includes COPD,Asthma and HTN, smoker. At this time estimated length of stay is 2-3 days, weekend cm will be available thru [facility]  for immediate needs, at this time transition of care is undetermined. Care Management Interventions  PCP Verified by CM:  Yes  Palliative Care Criteria Met (RRAT>21 & CHF Dx)?: No

## 2021-09-04 ENCOUNTER — APPOINTMENT (OUTPATIENT)
Dept: GENERAL RADIOLOGY | Age: 71
DRG: 189 | End: 2021-09-04
Attending: INTERNAL MEDICINE
Payer: MEDICARE

## 2021-09-04 PROBLEM — R77.8 TROPONIN LEVEL ELEVATED: Status: ACTIVE | Noted: 2021-09-04

## 2021-09-04 LAB
ANION GAP SERPL CALC-SCNC: 8 MMOL/L (ref 3–18)
BNP SERPL-MCNC: 3499 PG/ML (ref 0–900)
BUN SERPL-MCNC: 24 MG/DL (ref 7–18)
BUN/CREAT SERPL: 19 (ref 12–20)
CALCIUM SERPL-MCNC: 8.5 MG/DL (ref 8.5–10.1)
CHLORIDE SERPL-SCNC: 91 MMOL/L (ref 100–111)
CK MB CFR SERPL CALC: 11.2 % (ref 0–4)
CK MB SERPL-MCNC: 17.5 NG/ML (ref 5–25)
CK SERPL-CCNC: 156 U/L (ref 26–192)
CO2 SERPL-SCNC: 26 MMOL/L (ref 21–32)
CREAT SERPL-MCNC: 1.27 MG/DL (ref 0.6–1.3)
D DIMER PPP FEU-MCNC: 0.64 UG/ML(FEU)
ERYTHROCYTE [DISTWIDTH] IN BLOOD BY AUTOMATED COUNT: 12.6 % (ref 11.6–14.5)
GLUCOSE SERPL-MCNC: 129 MG/DL (ref 74–99)
HCT VFR BLD AUTO: 31.6 % (ref 35–45)
HGB BLD-MCNC: 10.8 G/DL (ref 12–16)
L PNEUMO AG UR QL IA: NEGATIVE
MCH RBC QN AUTO: 31.7 PG (ref 24–34)
MCHC RBC AUTO-ENTMCNC: 34.2 G/DL (ref 31–37)
MCV RBC AUTO: 92.7 FL (ref 78–100)
PLATELET # BLD AUTO: 219 K/UL (ref 135–420)
PMV BLD AUTO: 10.8 FL (ref 9.2–11.8)
POTASSIUM SERPL-SCNC: 4.8 MMOL/L (ref 3.5–5.5)
RBC # BLD AUTO: 3.41 M/UL (ref 4.2–5.3)
S PNEUM AG UR QL: NEGATIVE
SODIUM SERPL-SCNC: 125 MMOL/L (ref 136–145)
SODIUM UR-SCNC: 26 MMOL/L (ref 20–110)
TROPONIN I SERPL-MCNC: 0.06 NG/ML (ref 0–0.04)
WBC # BLD AUTO: 9.6 K/UL (ref 4.6–13.2)

## 2021-09-04 PROCEDURE — 74011250637 HC RX REV CODE- 250/637: Performed by: INTERNAL MEDICINE

## 2021-09-04 PROCEDURE — 85027 COMPLETE CBC AUTOMATED: CPT

## 2021-09-04 PROCEDURE — 77010033678 HC OXYGEN DAILY

## 2021-09-04 PROCEDURE — 80048 BASIC METABOLIC PNL TOTAL CA: CPT

## 2021-09-04 PROCEDURE — 74011250636 HC RX REV CODE- 250/636: Performed by: INTERNAL MEDICINE

## 2021-09-04 PROCEDURE — 65660000000 HC RM CCU STEPDOWN

## 2021-09-04 PROCEDURE — 83880 ASSAY OF NATRIURETIC PEPTIDE: CPT

## 2021-09-04 PROCEDURE — 97162 PT EVAL MOD COMPLEX 30 MIN: CPT

## 2021-09-04 PROCEDURE — 74011250637 HC RX REV CODE- 250/637: Performed by: HOSPITALIST

## 2021-09-04 PROCEDURE — 85379 FIBRIN DEGRADATION QUANT: CPT

## 2021-09-04 PROCEDURE — 74011250637 HC RX REV CODE- 250/637: Performed by: FAMILY MEDICINE

## 2021-09-04 PROCEDURE — 94640 AIRWAY INHALATION TREATMENT: CPT

## 2021-09-04 PROCEDURE — 74011000250 HC RX REV CODE- 250: Performed by: EMERGENCY MEDICINE

## 2021-09-04 PROCEDURE — 36415 COLL VENOUS BLD VENIPUNCTURE: CPT

## 2021-09-04 PROCEDURE — 74011000250 HC RX REV CODE- 250: Performed by: HOSPITALIST

## 2021-09-04 PROCEDURE — 87205 SMEAR GRAM STAIN: CPT

## 2021-09-04 PROCEDURE — 74011250636 HC RX REV CODE- 250/636: Performed by: HOSPITALIST

## 2021-09-04 PROCEDURE — 82553 CREATINE MB FRACTION: CPT

## 2021-09-04 PROCEDURE — 74011250636 HC RX REV CODE- 250/636: Performed by: EMERGENCY MEDICINE

## 2021-09-04 PROCEDURE — 74011000250 HC RX REV CODE- 250: Performed by: INTERNAL MEDICINE

## 2021-09-04 PROCEDURE — 97116 GAIT TRAINING THERAPY: CPT

## 2021-09-04 PROCEDURE — 71045 X-RAY EXAM CHEST 1 VIEW: CPT

## 2021-09-04 RX ORDER — FUROSEMIDE 10 MG/ML
20 INJECTION INTRAMUSCULAR; INTRAVENOUS ONCE
Status: COMPLETED | OUTPATIENT
Start: 2021-09-04 | End: 2021-09-04

## 2021-09-04 RX ORDER — AZITHROMYCIN 250 MG/1
500 TABLET, FILM COATED ORAL ONCE
Status: COMPLETED | OUTPATIENT
Start: 2021-09-04 | End: 2021-09-04

## 2021-09-04 RX ORDER — CETIRIZINE HCL 10 MG
10 TABLET ORAL DAILY
Status: DISCONTINUED | OUTPATIENT
Start: 2021-09-04 | End: 2021-09-08 | Stop reason: HOSPADM

## 2021-09-04 RX ORDER — AZITHROMYCIN 250 MG/1
250 TABLET, FILM COATED ORAL DAILY
Status: DISCONTINUED | OUTPATIENT
Start: 2021-09-05 | End: 2021-09-08 | Stop reason: HOSPADM

## 2021-09-04 RX ORDER — GUAIFENESIN 100 MG/5ML
81 LIQUID (ML) ORAL DAILY
Status: DISCONTINUED | OUTPATIENT
Start: 2021-09-04 | End: 2021-09-08 | Stop reason: HOSPADM

## 2021-09-04 RX ORDER — IPRATROPIUM BROMIDE AND ALBUTEROL SULFATE 2.5; .5 MG/3ML; MG/3ML
3 SOLUTION RESPIRATORY (INHALATION)
Status: DISCONTINUED | OUTPATIENT
Start: 2021-09-04 | End: 2021-09-08 | Stop reason: HOSPADM

## 2021-09-04 RX ORDER — TELMISARTAN 80 MG/1
80 TABLET ORAL DAILY
Status: DISCONTINUED | OUTPATIENT
Start: 2021-09-04 | End: 2021-09-08 | Stop reason: HOSPADM

## 2021-09-04 RX ORDER — FUROSEMIDE 10 MG/ML
20 INJECTION INTRAMUSCULAR; INTRAVENOUS DAILY
Status: DISCONTINUED | OUTPATIENT
Start: 2021-09-04 | End: 2021-09-04

## 2021-09-04 RX ADMIN — ARFORMOTEROL TARTRATE 15 MCG: 15 SOLUTION RESPIRATORY (INHALATION) at 08:17

## 2021-09-04 RX ADMIN — IPRATROPIUM BROMIDE AND ALBUTEROL SULFATE 3 ML: .5; 3 SOLUTION RESPIRATORY (INHALATION) at 13:27

## 2021-09-04 RX ADMIN — CEFTRIAXONE 1 G: 1 INJECTION, POWDER, FOR SOLUTION INTRAMUSCULAR; INTRAVENOUS at 13:10

## 2021-09-04 RX ADMIN — ENOXAPARIN SODIUM 40 MG: 40 INJECTION SUBCUTANEOUS at 17:48

## 2021-09-04 RX ADMIN — GUAIFENESIN 600 MG: 600 TABLET, EXTENDED RELEASE ORAL at 09:03

## 2021-09-04 RX ADMIN — CETIRIZINE HYDROCHLORIDE 10 MG: 10 TABLET, FILM COATED ORAL at 16:11

## 2021-09-04 RX ADMIN — IPRATROPIUM BROMIDE AND ALBUTEROL SULFATE 3 ML: .5; 3 SOLUTION RESPIRATORY (INHALATION) at 17:12

## 2021-09-04 RX ADMIN — METHYLPREDNISOLONE SODIUM SUCCINATE 60 MG: 125 INJECTION, POWDER, FOR SOLUTION INTRAMUSCULAR; INTRAVENOUS at 13:09

## 2021-09-04 RX ADMIN — SODIUM CHLORIDE 75 ML/HR: 900 INJECTION, SOLUTION INTRAVENOUS at 16:11

## 2021-09-04 RX ADMIN — ASPIRIN 81 MG: 81 TABLET, CHEWABLE ORAL at 14:18

## 2021-09-04 RX ADMIN — METOPROLOL TARTRATE 50 MG: 50 TABLET, FILM COATED ORAL at 21:11

## 2021-09-04 RX ADMIN — IPRATROPIUM BROMIDE AND ALBUTEROL SULFATE 3 ML: .5; 3 SOLUTION RESPIRATORY (INHALATION) at 00:17

## 2021-09-04 RX ADMIN — DILTIAZEM HYDROCHLORIDE 120 MG: 120 CAPSULE, COATED, EXTENDED RELEASE ORAL at 09:05

## 2021-09-04 RX ADMIN — TELMISARTAN 80 MG: 80 TABLET ORAL at 09:12

## 2021-09-04 RX ADMIN — METOPROLOL TARTRATE 50 MG: 50 TABLET, FILM COATED ORAL at 09:04

## 2021-09-04 RX ADMIN — BUDESONIDE 500 MCG: 0.5 INHALANT RESPIRATORY (INHALATION) at 08:17

## 2021-09-04 RX ADMIN — ACETAMINOPHEN 650 MG: 325 TABLET ORAL at 22:02

## 2021-09-04 RX ADMIN — METHYLPREDNISOLONE SODIUM SUCCINATE 60 MG: 125 INJECTION, POWDER, FOR SOLUTION INTRAMUSCULAR; INTRAVENOUS at 21:11

## 2021-09-04 RX ADMIN — GUAIFENESIN 600 MG: 600 TABLET, EXTENDED RELEASE ORAL at 21:11

## 2021-09-04 RX ADMIN — DIPHENHYDRAMINE HYDROCHLORIDE 25 MG: 25 CAPSULE ORAL at 22:02

## 2021-09-04 RX ADMIN — FUROSEMIDE 20 MG: 10 INJECTION, SOLUTION INTRAMUSCULAR; INTRAVENOUS at 11:50

## 2021-09-04 RX ADMIN — METHYLPREDNISOLONE SODIUM SUCCINATE 60 MG: 125 INJECTION, POWDER, FOR SOLUTION INTRAMUSCULAR; INTRAVENOUS at 05:47

## 2021-09-04 RX ADMIN — IPRATROPIUM BROMIDE AND ALBUTEROL SULFATE 3 ML: .5; 3 SOLUTION RESPIRATORY (INHALATION) at 08:18

## 2021-09-04 RX ADMIN — AZITHROMYCIN MONOHYDRATE 500 MG: 250 TABLET ORAL at 09:03

## 2021-09-04 NOTE — PROGRESS NOTES
Problem: Mobility Impaired (Adult and Pediatric)  Goal: *Acute Goals and Plan of Care (Insert Text)  Description: Physical Therapy Goals   Initiated 9/4/2021 and to be accomplished within 5-7 day(s)  1. Patient will move from supine <> sit with S in prep for out of bed activity and change of position. 2.  Patient will perform sit<> stand with mod I/RW in prep for transfers/ambulation. 3.  Patient will transfer from bed <> chair with mod I/RW for time up in chair for completion of ADL activity. 4.  Patient will ambulate 80 feet with mod I/RW for improved functional mobility at discharge. 5.  Patient will ascend/descend 3-5 stairs with handrail(s) with minimal assist for home re-entry as needed as PTA. Outcome: Progressing Towards Goal  PHYSICAL THERAPY EVALUATION    Patient: Bravo Truong (33 y.o. female)  Date: 9/4/2021  Primary Diagnosis: COPD with acute exacerbation ([facility] [address] [J44.1]  [facility] [J96.01]  Precautions:   Fall  PLOF: amb with RW and lives with son and DIL for the last year. No falls in the last 11 months per DIL report (since pt moved in with son and DIL). ASSESSMENT :  Based on the objective data described below, the patient is seen on telemetry unit following admission for above dx. Pt found seated EOB and pt DIL visiting in room. Pt reports h/o R hip fx and repair in the past. Ambulating with RW shorter distances and has w/c for long distance mobility. Pt presents with ROM grossly WFL, decr'd strength BLE's 4- to 4/5. Pt demonstrates transfer STS with RW/supervision and participates with GT/RW/CGA 32ft. Elvia and foot clearance decreased; verbal cues to stand more closely inside RW and to look to horizon (not floor). Pt expressed understanding. Intermittent coughing noted. Pt O2 sat in 3Lnc 100% at rest. Desat to 92% post gt, but quickly increased to 98%.   Pt left sitting in chair at bedside with all needs in reach and DIL remains present. Pt educated in PT POC and expressed understanding. Nurse Angie Guan notified of above. Recommend HHPT for follow up physical therapy upon discharge to reach maximal level of independence/safety with functional mobility. Patient will benefit from skilled intervention to address the above impairments. Pt Education: Role of physical therapy in acute care setting, fall prevention and safety/technique during functional mobility tasks    Patient's rehabilitation potential is considered to be Fair  Factors which may influence rehabilitation potential include:   []         None noted  []         Mental ability/status  [x]         Medical condition  []         Home/family situation and support systems  []         Safety awareness  []         Pain tolerance/management  []         Other:      PLAN :  Recommendations and Planned Interventions:   [x]           Bed Mobility Training             []    Neuromuscular Re-Education  [x]           Transfer Training                   []    Orthotic/Prosthetic Training  [x]           Gait Training                          []    Modalities  [x]           Therapeutic Exercises           []    Edema Management/Control  [x]           Therapeutic Activities            []    Family Training/Education  [x]           Patient Education  []           Other (comment):    Frequency/Duration: Patient will be followed by physical therapy 1-2 times per day/4-7 days per week to address goals. Discharge Recommendations: Home Physical Therapy  Further Equipment Recommendations for Discharge: N/A     SUBJECTIVE:   Patient stated My back in a little sore.     OBJECTIVE DATA SUMMARY:     Past Medical History:   Diagnosis Date    Asthma     Chronic obstructive pulmonary disease ([address]     Hypertension    History reviewed. No pertinent surgical history.   Barriers to Learning/Limitations: yes;  physical  Compensate with: Verbal Cues  Home Situation:  Home Situation  Home Environment: Private residence  # Steps to Enter: 4  Rails to Enter: No (uses family assist)  One/Two Story Residence: Two story, live on 1st floor  Living Alone: No  Support Systems: Child(santa)  Patient Expects to be Discharged to[de-identified] [employer]  Current DME Used/Available at Home: [employer], straight, Walker, rolling, Wheelchair  Tub or Shower Type: Shower (with seat)  Critical Behavior:  Neurologic State: Alert; Appropriate for age  Orientation Level: Oriented X4  Cognition: Follows commands  Safety/Judgement: Awareness of environment; Fall prevention  Psychosocial  Patient Behaviors: Calm; Cooperative  Family  Behaviors: Calm;Supportive (DIL)  Purposeful Interaction: Yes  Pt Identified Daily Priority: Clinical issues (comment)  Caritas Process: Nurture loving kindness  Caring Interventions: Reassure  Reassure: Therapeutic listening; Informing  Therapeutic Modalities: Humor  Family  Behaviors: Calm;Supportive (DIL)  Strength:    Strength: Generally decreased, functional  Tone & Sensation:   Sensation: Intact  Range Of Motion:  AROM: Within functional limits  Functional Mobility:  Transfers:  Sit to Stand: Supervision  Stand to Sit: Supervision  Bed to Chair: Supervision;Stand-by assistance  Balance:   Sitting: Intact  Standing: Intact; With support  Ambulation/Gait Training:  Distance (ft): 32 Feet (ft)  Assistive Device: Gait belt;Walker, rolling  Ambulation - Level of Assistance: Contact guard assistance  Gait Description (WDL): Exceptions to WDL  Gait Abnormalities: Decreased step clearance  Speed/Elvia: Pace decreased (<100 feet/min)  Interventions: Safety awareness training;Verbal cues  Pain:  Pain level pre-treatment: 2/10   Pain level post-treatment: 2/10   Pain Intervention(s) : Medication (see MAR); Rest, Ice, Repositioning  Response to intervention: Nurse notified, See doc flow  Activity Tolerance:   Fair; limited by intermittent coughing  Please refer to the flowsheet for vital signs taken during this treatment.   After treatment:   [x] Patient left in no apparent distress sitting up in chair  []         Patient left in no apparent distress in bed  [x]         Call bell left within reach  [x]         Nursing notified  [x]         Caregiver present-DIL  []         Bed alarm activated  []         SCDs applied    COMMUNICATION/EDUCATION:   [x]         Role of Physical Therapy in the acute care setting. [x]         Fall prevention education was provided and the patient/caregiver indicated understanding. [x]         Patient/family have participated as able in goal setting and plan of care. [x]         Patient/family agree to work toward stated goals and plan of care. []         Patient understands intent and goals of therapy, but is neutral about his/her participation. []         Patient is unable to participate in goal setting/plan of care: ongoing with therapy staff.  []         Other:     Thank you for this referral.  Sarah Godinez, PT   Time Calculation: 25 mins      Eval Complexity: History: HIGH Complexity :3+ comorbidities / personal factors will impact the outcome/ POC Exam:MEDIUM Complexity : 3 Standardized tests and measures addressing body structure, function, activity limitation and / or participation in recreation  Presentation: MEDIUM Complexity : Evolving with changing characteristics  Clinical Decision Making:Medium Complexity    Overall Complexity:MEDIUM

## 2021-09-04 NOTE — PROGRESS NOTES
Hospitalist Progress Note    Patient: Jovani Art MRN: [id number]  CSN: [id number]    YOB: 1950  Age: 79 y.o. Sex: female    DOA: 2021 LOS:  LOS: 2 days            Assessment/Plan   1. Acute COPD exacerbation- oxygen requirements improving but still w significant bronchospasm  2. Hyponatremia- unchanged  3. Tachycardia in setting of above- resolved  4. HTN- improved  5. Hypothyroidism  6. eleated BNP with out signs of pulmonary edema/ volume overload  7. Tobacco dependence      Plan:  - continue solumedrol at current dose  - IV ceftriaxone, a dd zithromax  - DC HCTZ  - increase IVF rate  - increase scheduled duonebs during day, prn alubterol  - brovana/ pulmnicort  - mucinex   - mobilize w PT  - full code,  dvt ppx lovenox    Discussed code status at length She reports she does not want to be on vent but unsure at this time. She ntoes she would want CPR in event of cardiac arrest. DIscussed taht should she have a cardiac arrest she would automatically be placed on the vent. She verbalized understanding and at this time is FULL CODE. Time spent discussing advanced care plannin minutes aside exam  Discussed tobacco cessation as well. Patient Active Problem List   Diagnosis Code    Hypertension I10    COPD exacerbation ([facility] [address] J44.1    Hyponatremia E87.1    Cholelithiasis K80.20    COPD with acute exacerbation ([facility] [address] J44.1    Tobacco use Z72.0               Subjective:    cc: coughing/ wheezing    Pt coughing/ wheezing persists       REVIEW OF SYSTEMS:  General: No fevers or chills. Cardiovascular: No chest pain or pressure. No palpitations. Pulmonary: No shortness of breath. Gastrointestinal: No nausea, vomiting.      Objective:        Vital signs/Intake and Output:  Visit Vitals  /67 (BP 1 Location: Right upper arm, BP Patient Position: At rest)   Pulse 60   Temp 97.5 °F (36.4 °C)   Resp 20   Ht 5' 7\" (1.702 m)   Wt 72.6 kg (160 lb)   SpO2 100%   BMI 25.06 kg/m²     Current Shift:  09/04 0701 - 09/04 1900  In: 076 [I.V.:442]  Out: -   Last three shifts:  09/02 1901 - 09/04 0700  In: 620 [P.O.:620]  Out: 750 [Urine:750]    Body mass index is 25.06 kg/m².     Physical Exam:  GEN: Alert and oriented times three NAD  EYES: conjunctiva normal, lids with out lesions  HEENT: MMM, No thyromegaly, no lymphadenopathy  HEART: RRR +S1 +S2, no JVD, pulses 2+ distally  LUNGS: diffuse wheezes, o rales or rhonchi  ABDOMEN: + BS, soft NT/ND no organomegaly,  No rebound  EXTREMITIES: No edema cyanosis, cap refill normal   SKIN: no rashes or skin breakdown, no nodules, normal turgor  Current Facility-Administered Medications   Medication Dose Route Frequency    albuterol-ipratropium (DUO-NEB) 2.5 MG-0.5 MG/3 ML  3 mL Nebulization QID RT    telmisartan (MICARDIS) tablet 80 mg  80 mg Oral DAILY    metoprolol tartrate (LOPRESSOR) tablet 50 mg  50 mg Oral BID    albuterol-ipratropium (DUO-NEB) 2.5 MG-0.5 MG/3 ML  3 mL Nebulization TID    albuterol-ipratropium (DUO-NEB) 2.5 MG-0.5 MG/3 ML  3 mL Nebulization Q15MIN PRN    cefTRIAXone (ROCEPHIN) 1 g in sterile water (preservative free) 10 mL IV syringe  1 g IntraVENous Q24H    sodium chloride (NS) flush 5-40 mL  5-40 mL IntraVENous Q8H    sodium chloride (NS) flush 5-40 mL  5-40 mL IntraVENous PRN    0.9% sodium chloride infusion  75 mL/hr IntraVENous CONTINUOUS    budesonide (PULMICORT) 500 mcg/2 ml nebulizer suspension  500 mcg Nebulization BID RT    methylPREDNISolone (PF) (Solu-MEDROL) injection 60 mg  60 mg IntraVENous Q8H    enoxaparin (LOVENOX) injection 40 mg  40 mg SubCUTAneous Q24H    arformoteroL (BROVANA) neb solution 15 mcg  15 mcg Nebulization BID RT    guaiFENesin ER (MUCINEX) tablet 600 mg  600 mg Oral Q12H    levalbuterol (XOPENEX) nebulizer soln 0.63 mg/3 mL  0.63 mg Nebulization Q4H PRN    dilTIAZem ER (CARDIZEM CD) capsule 120 mg  120 mg Oral DAILY    acetaminophen (TYLENOL) tablet 650 mg 650 mg Oral Q6H PRN    diphenhydrAMINE (BENADRYL) capsule 25 mg  25 mg Oral QHS PRN         All the patient's labs over the past 24 hours were reviewed both during my initial daily workflow process and at the time notated as \"note time\" in [facility]. (It is not time stamped separately in this workflow.)  Select labs are listed below.         Labs: Results:       Chemistry Recent Labs     09/04/21 0337 09/03/21 0834 09/02/21 0933   * 129* 119*   * 124* 124*   K 4.8 4.3 3.9   CL 91* 90* 87*   CO2 26 26 27   BUN 24* 12 10   CREA 1.27 0.98 1.11   CA 8.5 9.3 9.8   AGAP 8 8 10   BUCR 19 12 9*   AP  --   --  103   TP  --   --  8.1   ALB  --   --  4.2   GLOB  --   --  3.9   AGRAT  --   --  1.1      CBC w/Diff Recent Labs     09/04/21 0337 09/03/21 0834 09/02/21 0933   WBC 9.6 6.5 6.5   RBC 3.41* 3.70* 4.05*   HGB 10.8* 11.7* 13.1   HCT 31.6* 34.5* 36.3    212 214   GRANS  --   --  57   LYMPH  --   --  23   EOS  --   --  12*      Cardiac Enzymes Recent Labs     09/02/21 0933      CKND1 4.6*                  Liver Enzymes Recent Labs     09/02/21 0933   TP 8.1   ALB 4.2             Procedures/imaging: see electronic medical records for all procedures/Xrays and details which were not copied into this note but were reviewed prior to creation of Arthur [address], DO  Internal Medicine/Geriatrics

## 2021-09-04 NOTE — ROUTINE PROCESS
Verbal shift change report given to Mejia Sebas Price Juan Carlos by Kelly Matias RN. Report included the following information SBAR, Kardex, OR Summary, Intake/Output and MAR.

## 2021-09-04 NOTE — PROGRESS NOTES
1954 Assumed patient care at this time. Report received from Gildardo Terrell RN. Patient in bed in lowest position with wheels locked. Call light within reach. 2045 Shift assessment completed. 2030 Called respiratory for breathing treatment due to patient wheezing. 2150 PRN Tylenol and Benadryl administered. [id number] Bedside and verbal shift change report given to Hortencia Howell (oncoming nurse) by Beverly Thornton RN (offgoing nurse). Report included the following information: SBAR, Kardex, MAR, and recent results.

## 2021-09-04 NOTE — CONSULTS
[facility]  Pulmonary, Critical Care, and Sleep Medicine    Name: Silver Tracy MRN: [id number]   : 1950 Hospital: [facility] [place]    Date: 2021  Room: [address] [place] [address] Note                                              Consult requesting physician: Dr. Lizzy Davis    Reason for Consult: acute hypoxemic respiratory failure, acute copd excerebration       IMPRESSION:     ·   Patient Active Problem List   Diagnosis Code    Hypertension I10    COPD exacerbation ([address] J44.1    Hyponatremia E87.1    Cholelithiasis K80.20    COPD with acute exacerbation ([address] J44.1    Tobacco use Z72.0 ·   hypoxemia   · Troponin elevation  · Diastolic CHF      · Code status: Full code       RECOMMENDATIONS:   Respiratory: acute hypoxemic respiratory failure in the setting of acute COPD exacerbation. Additional diastolic CHF and positive trop hypertensive heart ? Now on NC2L  2021  IMPRESSION  Questionable minimal interstitial infiltrates in each lung base, far from  definite. Otherwise negative. ABG  7.42/37/70/94  Ct of the chest on 2021  1. Diffuse bronchial wall thickening and scattered foci of endobronchial mucoid  impaction with several faint centrilobular distribution nodules which are very  likely infectious or inflammatory in nature. 2. Several small additional pulmonary nodules as described above. Please see  below guidelines for follow-up. 3. Cholelithiasis. V/Q scan on      IMPRESSION  No discrete perfusional abnormality to suggest the presence of pulmonary  embolism. At home only on DuoNeb. Smoker since age 15 still smoking half a pack a day previously up to 1 pack a pack a day about 50 packs history. Suspect at least moderate COPD. Moved here from [place]. No recent pulmonary function test or appropriate COPD therapy. Start Brovana and Pulmicort if tolerating well can switch to Symbicort for home.   Add asa and prn lasix  Patient had some cough after DuoNeb so we will switch to as needed. VQ scan negative for PE.  CT suggestive of chronic bronchitis bronchiectasis and newly developing pneumonia. PVL negative. Tachycardia echo pending. hao on cardizem          CT of the chest 9/2/2021  IMPRESSION    1. Diffuse bronchial wall thickening and scattered foci of endobronchial mucoid  impaction with several faint centrilobular distribution nodules which are very  likely infectious or inflammatory in nature. 2. Several small additional pulmonary nodules as described above. Please see  below guidelines for follow-up. 3. Cholelithiasis. Possible allergies checl ige and add loratidine consider singular     Keep SPO2 >=92%. HOB 30 degree elevation all the time. Aggressive pulmonary toileting. Aspiration precautions. Incentive spirometry. CVS: Already on Cardizem Echo pending  Positive trop  BNP elevated  Hypertensive heart  Add  positive trop and prn lasix   Already on ARB and bblocker   ID: acute COPD exacerbation currently acquired pneumonia antibiotics for 5 days  Was vaccinated COVID-19 negative  Sepsis bundle and protocol followed. Follow serial lactic acid, frequent BMP check, fluid resuscitation. Follow cultures. Deescalate antibiotic when appropriate. Hematology/Oncology: White blood cells and hemoglobin  Renal: Improving, GUERRERO  GI/:prophylaxis  Endocrine: Monitor BS. SSI. Neurology: awake  Toxicology: none  Pain/Sedation: prn  Skin/Wound: none  Electrolytes: Replace electrolytes   IVF: prn    Prophylaxis: DVT Prophylaxis: SCD/yes GI Prophylaxis: Protonix/  Restraints: none    Lines/Tubes: PIV*Other:  PT/OT eval and treat. OOB.    Advance Directive/Palliative Care: consulted    Will defer respective systems problem management to primary and other respective consultant and follow patient in ICU with primary and other medical team.  Further recommendations will be based on the patient's response to recommended treatment and results of the investigation ordered. Quality Care: PPI, DVT prophylaxis, HOB elevated, Infection control all reviewed and addressed. Care of plan d/w hospitalist team, RN, RT, MDR.  D/w patient and family (answered all questions to satisfaction). High complexity decision making was performed during the evaluation of this patient at high risk for decompensation with multiple organ involvement. Subjective/History of Present Illness:     Patient is a 79 y.o. female with PMHx significant for COPD only on DuoNeb at home, hypertension, increased BMI current smoker for over 50 years half a pack a day previously 1 pack. Presents with 3 days of shortness of breath chest tightness and wheezing. Tachycardic on admission, VQ scan negative PVL negative for PE. No DVT. Hypoxemic respiratory failure. Currently improving on bronchodilators but has some cough afternoon DuoNeb. Suspect at least moderate COPD, possibly new developing pneumonia small infiltrate at the bases, chills,. Covid test negative also vaccinated for Covid. Acute COPD exacerbation with hypoxemic respiratory failure. 9/4/2021:  New monitoring patient on the floor add Brovana and Pulmicort if tolerates well I will switch tomorrow to Symbicort. Continue steroids. Add asa  BNP high trop ? Hypertensive heart now on mutiple emds   Prn lasix      I/O last 24 hrs: Intake/Output Summary (Last 24 hours) at 9/4/2021 1000  Last data filed at 9/4/2021 1307  Gross per 24 hour   Intake 941.98 ml   Output 750 ml   Net 191.98 ml         History taken from patient    Review of Systems:  A comprehensive review of systems was negative except for that written in the HPI.        Review of Systems:   HEENT: No epistaxis, no nasal drainage, no difficulty in swallowing, no redness in eyes  Respiratory: as above  Cardiovascular: no chest pain, no palpitations, no chronic leg edema, no syncope  Gastrointestinal: no abd pain, no vomiting, no diarrhea, no bleeding symptoms  Genitourinary: No urinary symptoms or hematuria  Musculoskeletal: Neg  Neurological: No focal weakness, no seizures, no headaches  Behvioral/Psych: No anxiety, no depression  General : No fever, no chills, no weight loss, no night sweats     Allergies   Allergen Reactions    Codeine Rash    Demerol [Meperidine] Rash    Sulfa (Sulfonamide Antibiotics) Rash      Past Medical History:   Diagnosis Date    Asthma     Chronic obstructive pulmonary disease ([facility] [address]     Hypertension       History reviewed. No pertinent surgical history. Social History     Tobacco Use    Smoking status: Current Every Day Smoker     Packs/day: 0.50    Smokeless tobacco: Never Used   Substance Use Topics    Alcohol use: Not Currently      History reviewed. No pertinent family history. Prior to Admission medications    Medication Sig Start Date End Date Taking? Authorizing Provider   metoprolol tartrate (LOPRESSOR) 50 mg tablet Take 50 mg by mouth two (2) times a day. Yes Provider, Historical   telmisartan-hydroCHLOROthiazide (MICARDIS HCT) 80-25 mg per tablet Take 1 Tablet by mouth daily. Yes Provider, Historical   amLODIPine (NORVASC) 5 mg tablet Take 5 mg by mouth daily. Yes Provider, Historical   multivitamin (ONE A DAY) tablet Take 1 Tablet by mouth daily.    Yes Provider, Historical     Current Facility-Administered Medications   Medication Dose Route Frequency    albuterol-ipratropium (DUO-NEB) 2.5 MG-0.5 MG/3 ML  3 mL Nebulization QID RT    telmisartan (MICARDIS) tablet 80 mg  80 mg Oral DAILY    [START ON 9/5/2021] azithromycin (ZITHROMAX) tablet 250 mg  250 mg Oral DAILY    furosemide (LASIX) injection 20 mg  20 mg IntraVENous DAILY    metoprolol tartrate (LOPRESSOR) tablet 50 mg  50 mg Oral BID    cefTRIAXone (ROCEPHIN) 1 g in sterile water (preservative free) 10 mL IV syringe  1 g IntraVENous Q24H    sodium chloride (NS) flush 5-40 mL  5-40 mL IntraVENous Q8H    0.9% sodium chloride infusion  75 mL/hr IntraVENous CONTINUOUS    budesonide (PULMICORT) 500 mcg/2 ml nebulizer suspension  500 mcg Nebulization BID RT    methylPREDNISolone (PF) (Solu-MEDROL) injection 60 mg  60 mg IntraVENous Q8H    enoxaparin (LOVENOX) injection 40 mg  40 mg SubCUTAneous Q24H    arformoteroL (BROVANA) neb solution 15 mcg  15 mcg Nebulization BID RT    guaiFENesin ER (MUCINEX) tablet 600 mg  600 mg Oral Q12H    dilTIAZem ER (CARDIZEM CD) capsule 120 mg  120 mg Oral DAILY         Objective:   Vital Signs:    Visit Vitals  /67 (BP 1 Location: Right upper arm, BP Patient Position: At rest)   Pulse 60   Temp 97.5 °F (36.4 °C)   Resp 20   Ht 5' 7\" (1.702 m)   Wt 72.6 kg (160 lb)   SpO2 100%   BMI 25.06 kg/m²       O2 Device: Nasal cannula   O2 Flow Rate (L/min): 4 l/min   Temp (24hrs), Av.7 °F (36.5 °C), Min:97.5 °F (36.4 °C), Max:97.9 °F (36.6 °C)       Intake/Output:   Last shift:       07 - 1900  In: 442 [I.V.:442]  Out: -     Last 3 shifts: 1901 -  0700  In: 620 [P.O.:620]  Out: 750 [Urine:750]      Intake/Output Summary (Last 24 hours) at 2021 1000  Last data filed at 2021 0728  Gross per 24 hour   Intake 941.98 ml   Output 750 ml   Net 191.98 ml       Last 3 Recorded Weights in this Encounter    21 0905 21 1743   Weight: 72.6 kg (160 lb) 72.6 kg (160 lb)             Recent Labs     21  09   PHI 7.42   PCO2I 37.0   PO2I 70*   HCO3I 24.1       Physical Exam:     Impresson general : Alert, Awake, alert distress on minimal exertion  Head:   Normocephalic,atraumatic. ENT:   EOM  no scleral icterus, no pallor, no cyanosis. Nose:   No sinus tenderness  Throat:  Oropharynx ,mucosa, and tongue normal. No oral thrush. Neck:   Supple, symmetric. Lymph nodes. Trachea is line  Lung: Moderate air entry bilateral equal lateral rales. No rhonchi. Lateral extensive wheezing. No stridors. Yes prolongded expiration. yes accessory muscle use.    Heart:   Regular cardia rate & rhythm. S1 S2 present. No murmur. No JVD. Abdomen:  Soft. NT. ND. +BS. No masses. liver  and spleen  Extremities:  No pedal edema. No cyanosis. No clubbing. Pulses: 2+ and symmetric in DP. Capillary refill: normal  Lymphatic:  neck and supraclavicular    Musculoskeletal: No joint swelling. No tenderness. Skin:   Lesion Color, texture, turgor normal. No rashes or lesions.        Data:       Recent Results (from the past 24 hour(s))   D DIMER    Collection Time: 09/04/21  3:37 AM   Result Value Ref Range    D DIMER 0.64 (H) <0.46 ug/ml(FEU)   METABOLIC PANEL, BASIC    Collection Time: 09/04/21  3:37 AM   Result Value Ref Range    Sodium 125 (L) 136 - 145 mmol/L    Potassium 4.8 3.5 - 5.5 mmol/L    Chloride 91 (L) 100 - 111 mmol/L    CO2 26 21 - 32 mmol/L    Anion gap 8 3.0 - 18 mmol/L    Glucose 129 (H) 74 - 99 mg/dL    BUN 24 (H) 7.0 - 18 MG/DL    Creatinine 1.27 0.6 - 1.3 MG/DL    BUN/Creatinine ratio 19 12 - 20      GFR est AA 50 (L) >60 ml/min/1.73m2    GFR est non-AA 42 (L) >60 ml/min/1.73m2    Calcium 8.5 8.5 - 10.1 MG/DL   CBC W/O DIFF    Collection Time: 09/04/21  3:37 AM   Result Value Ref Range    WBC 9.6 4.6 - 13.2 K/uL    RBC 3.41 (L) 4.20 - 5.30 M/uL    HGB 10.8 (L) 12.0 - 16.0 g/dL    HCT 31.6 (L) 35.0 - 45.0 %    MCV 92.7 78.0 - 100.0 FL    MCH 31.7 24.0 - 34.0 PG    MCHC 34.2 31.0 - 37.0 g/dL    RDW 12.6 11.6 - 14.5 %    PLATELET 693 367 - 024 K/uL    MPV 10.8 9.2 - 11.8 FL   NT-PRO BNP    Collection Time: 09/04/21  3:37 AM   Result Value Ref Range    NT pro-BNP 3,499 (H) 0 - 900 PG/ML         Chemistry Recent Labs     09/04/21  0337 09/03/21  0834 09/02/21  0933   * 129* 119*   * 124* 124*   K 4.8 4.3 3.9   CL 91* 90* 87*   CO2 26 26 27   BUN 24* 12 10   CREA 1.27 0.98 1.11   CA 8.5 9.3 9.8   MG  --   --  1.7   AGAP 8 8 10   BUCR 19 12 9*   AP  --   --  103   TP  --   --  8.1   ALB  --   --  4.2   GLOB  --   --  3.9   AGRAT  --   --  1.1        Lactic Acid No results found for: LAC  No results for input(s): LAC in the last 72 hours. Liver Enzymes Protein, total   Date Value Ref Range Status   09/02/2021 8.1 6.4 - 8.2 g/dL Final     Albumin   Date Value Ref Range Status   09/02/2021 4.2 3.4 - 5.0 g/dL Final     Globulin   Date Value Ref Range Status   09/02/2021 3.9 2.0 - 4.0 g/dL Final     A-G Ratio   Date Value Ref Range Status   09/02/2021 1.1 0.8 - 1.7   Final     Alk. phosphatase   Date Value Ref Range Status   09/02/2021 103 45 - 117 U/L Final     Recent Labs     09/02/21 0933   TP 8.1   ALB 4.2   GLOB 3.9   AGRAT 1.1           CBC w/Diff Recent Labs     09/04/21  0337 09/03/21  0834 09/02/21 0933   WBC 9.6 6.5 6.5   RBC 3.41* 3.70* 4.05*   HGB 10.8* 11.7* 13.1   HCT 31.6* 34.5* 36.3    212 214   GRANS  --   --  57   LYMPH  --   --  23   EOS  --   --  12*        Cardiac Enzymes No results found for: CPK, CK, CKMMB, CKMB, RCK3, CKMBT, CKNDX, CKND1, ABDIEL, TROPT, TROIQ, TRES, TROPT, TNIPOC, BNP, BNPP     BNP No results found for: BNP, BNPP, XBNPT     Coagulation No results for input(s): PTP, INR, APTT, INREXT, INREXT in the last 72 hours. Thyroid  No results found for: T4, T3U, TSH, TSHEXT, TSHEXT    No results found for: T4     Urinalysis No results found for: COLOR, APPRN, SPGRU, REFSG, CANDE, PROTU, GLUCU, KETU, BILU, UROU, DERICK, LEUKU, GLUKE, EPSU, BACTU, WBCU, RBCU, CASTS, UCRY     Micro  Recent Labs     09/02/21 0933   CULT NO GROWTH AFTER 21 HOURS  NO GROWTH AFTER 21 HOURS     Recent Labs     09/02/21 0933   CULT NO GROWTH AFTER 21 HOURS  NO GROWTH AFTER 21 HOURS          Culture data during this hospitalization.    All Micro Results     Procedure Component Value Units Date/Time    Thania Carreras, URINE [[id number]] Collected: 09/03/21 1900    Order Status: Completed Specimen: Urine Updated: 09/03/21 2053    LEGIONELLA PNEUMOPHILA AG, URINE [[id number]] Collected: 09/03/21 1900    Order Status: Completed Specimen: Urine Updated: 09/03/21 2053    CULTURE, RESPIRATORY/SPUTUM/BRONCH Elsworth Ivey STAIN [[id number]]     Order Status: Sent Specimen: Sputum     CULTURE, BLOOD [[id number]] Collected: 09/02/21 0933    Order Status: Completed Specimen: Blood Updated: 09/03/21 0743     Special Requests: NO SPECIAL REQUESTS        Culture result: NO GROWTH AFTER 21 HOURS       CULTURE, BLOOD [[id number]] Collected: 09/02/21 0933    Order Status: Completed Specimen: Blood Updated: 09/03/21 0743     Special Requests: NO SPECIAL REQUESTS        Culture result: NO GROWTH AFTER 21 HOURS       COVID-19 RAPID TEST [[id number]] Collected: 09/02/21 0915    Order Status: Completed Specimen: Nasopharyngeal Updated: 09/02/21 0949     Specimen source Nasopharyngeal        COVID-19 rapid test Not detected        Comment: Rapid Abbott ID Now       Rapid NAAT:  The specimen is NEGATIVE for SARS-CoV-2, the novel coronavirus associated with COVID-19. Negative results should be treated as presumptive and, if inconsistent with clinical signs and symptoms or necessary for patient management, should be tested with an alternative molecular assay. Negative results do not preclude SARS-CoV-2 infection and should not be used as the sole basis for patient management decisions. This test has been authorized by the FDA under an Emergency Use Authorization (EUA) for use by authorized laboratories. Fact sheet for Healthcare Providers: ConventionUpdate.co.nz  Fact sheet for Patients: ConventionUpdate.co.nz       Methodology: Isothermal Nucleic Acid Amplification                    PFT       Ultrasound       LE Doppler       ECHO       Images report reviewed by me:  CT (Most Recent) (CT chest reviewed by me) Results from Hospital Encounter encounter on 09/02/21    CT CHEST WO CONT    Narrative  EXAM: CT Chest    INDICATION: Tachycardia and hypoxia. .    COMPARISON: Radiographs same day. No prior CT imaging available for  review/comparison.     TECHNIQUE: Axial CT imaging from the thoracic inlet through the diaphragm  without    intravenous contrast. Multiplanar reformations were generated. One or more dose reduction techniques were used on this CT: automated exposure  control, adjustment of the mAs and/or kVp according to patient size, and  iterative reconstruction techniques. The specific techniques used on this CT  exam have been documented in the patient's electronic medical record. Digital  Imaging and Communications in Medicine (DICOM) format image data are available  to nonaffiliated external healthcare facilities or entities on a secure, media  free, reciprocally searchable basis with patient authorization for at least a  12-month period after this study. _______________    FINDINGS:    LUNGS:  > No alveolar consolidation. No significant groundglass abnormality or  abnormal septal line thickening.  > Small cluster of 2 to 3 mm subpleural pulmonary nodules within the  peripheral right lower lobe (image numbers [id number]). > 2 to 3 mm fissural nodule along the left major fissure (image 69). > 3 mm nodule posterior left upper lobe (image 34)  > 4 mm medial left upper lobe nodule (image 67)    PLEURA: Unremarkable. AIRWAY: Diffuse bronchial wall thickening is present with several scattered foci  of endobronchial mucoid impaction. MEDIASTINUM: Included thyroid gland is unremarkable. Cardiac size normal.  Multivessel coronary arterial atherosclerosis. Thoracic aorta is normal in  course/caliber with diffuse atherosclerotic calcifications. LYMPH NODES: No enlarged lymph nodes by size criteria. UPPER ABDOMEN: Excreted contrast is present within the renal collecting systems  from prior attempted PE protocol. Small gallstone within the gallbladder. OSSEOUS STRUCTURES: No acute or aggressive appearing osseous abnormality. Prominent Schmorl's node superior endplate K69.    OTHER:    _______________    Impression  1.  Diffuse bronchial wall thickening and scattered foci of endobronchial mucoid  impaction with several faint centrilobular distribution nodules which are very  likely infectious or inflammatory in nature. 2. Several small additional pulmonary nodules as described above. Please see  below guidelines for follow-up. 3. Cholelithiasis.    ========    Fleischner Society pulmonary nodule guidelines (revised 2017): Multiple solid nodules <6 mm:  -Low risk for lung cancer: No follow-up. -High risk for lung cancer: Optional chest CT in 12 months. CXR reviewed by me:  XR (Most Recent). CXR  reviewed by me and compared with previous CXR Results from Hospital Encounter encounter on 09/02/21    XR CHEST PORT    Narrative  EXAM:  PORTABLE CHEST    INDICATION:  Shortness of breath. Cough. TECHNIQUE:  Portable, AP view. COMPARISON:  09/02/2021    ____________________    FINDINGS:    SUPPORT DEVICES: None. HEART AND MEDIASTINUM: Cardiomediastinal silhouette appears within normal  limits. Normal caliber thoracic aorta. LUNGS AND PLEURAL SPACES: Lungs are better inflated with no confluent airspace  opacity or pulmonary edema. No pleural effusion or pneumothorax. BONY THORAX AND SOFT TISSUES: No acute osseous abnormality. ____________________    Impression  No significant change since 09/02/2021 without acute  cardiopulmonary disease.            Ignacia Forbes MD  9/4/2021

## 2021-09-05 LAB
ANION GAP SERPL CALC-SCNC: 9 MMOL/L (ref 3–18)
BUN SERPL-MCNC: 33 MG/DL (ref 7–18)
BUN/CREAT SERPL: 22 (ref 12–20)
CALCIUM SERPL-MCNC: 9.2 MG/DL (ref 8.5–10.1)
CHLORIDE SERPL-SCNC: 92 MMOL/L (ref 100–111)
CK MB CFR SERPL CALC: 9.8 % (ref 0–4)
CK MB SERPL-MCNC: 12.5 NG/ML (ref 5–25)
CK SERPL-CCNC: 128 U/L (ref 26–192)
CO2 SERPL-SCNC: 27 MMOL/L (ref 21–32)
CREAT SERPL-MCNC: 1.52 MG/DL (ref 0.6–1.3)
ERYTHROCYTE [DISTWIDTH] IN BLOOD BY AUTOMATED COUNT: 12.6 % (ref 11.6–14.5)
GLUCOSE SERPL-MCNC: 128 MG/DL (ref 74–99)
HCT VFR BLD AUTO: 34.8 % (ref 35–45)
HGB BLD-MCNC: 11.8 G/DL (ref 12–16)
MCH RBC QN AUTO: 31.5 PG (ref 24–34)
MCHC RBC AUTO-ENTMCNC: 33.9 G/DL (ref 31–37)
MCV RBC AUTO: 92.8 FL (ref 78–100)
PLATELET # BLD AUTO: 237 K/UL (ref 135–420)
PMV BLD AUTO: 10.7 FL (ref 9.2–11.8)
POTASSIUM SERPL-SCNC: 4.6 MMOL/L (ref 3.5–5.5)
RBC # BLD AUTO: 3.75 M/UL (ref 4.2–5.3)
SODIUM SERPL-SCNC: 128 MMOL/L (ref 136–145)
TROPONIN I SERPL-MCNC: 0.02 NG/ML (ref 0–0.04)
WBC # BLD AUTO: 8 K/UL (ref 4.6–13.2)

## 2021-09-05 PROCEDURE — 65660000000 HC RM CCU STEPDOWN

## 2021-09-05 PROCEDURE — 74011250636 HC RX REV CODE- 250/636: Performed by: INTERNAL MEDICINE

## 2021-09-05 PROCEDURE — 80048 BASIC METABOLIC PNL TOTAL CA: CPT

## 2021-09-05 PROCEDURE — 74011250637 HC RX REV CODE- 250/637: Performed by: INTERNAL MEDICINE

## 2021-09-05 PROCEDURE — 86003 ALLG SPEC IGE CRUDE XTRC EA: CPT

## 2021-09-05 PROCEDURE — 74011000250 HC RX REV CODE- 250: Performed by: INTERNAL MEDICINE

## 2021-09-05 PROCEDURE — 94760 N-INVAS EAR/PLS OXIMETRY 1: CPT

## 2021-09-05 PROCEDURE — 74011250637 HC RX REV CODE- 250/637: Performed by: HOSPITALIST

## 2021-09-05 PROCEDURE — 82785 ASSAY OF IGE: CPT

## 2021-09-05 PROCEDURE — 82553 CREATINE MB FRACTION: CPT

## 2021-09-05 PROCEDURE — 74011000250 HC RX REV CODE- 250: Performed by: EMERGENCY MEDICINE

## 2021-09-05 PROCEDURE — 36415 COLL VENOUS BLD VENIPUNCTURE: CPT

## 2021-09-05 PROCEDURE — 77010033678 HC OXYGEN DAILY

## 2021-09-05 PROCEDURE — 85027 COMPLETE CBC AUTOMATED: CPT

## 2021-09-05 PROCEDURE — 94640 AIRWAY INHALATION TREATMENT: CPT

## 2021-09-05 PROCEDURE — 74011250636 HC RX REV CODE- 250/636: Performed by: HOSPITALIST

## 2021-09-05 PROCEDURE — 74011250637 HC RX REV CODE- 250/637: Performed by: FAMILY MEDICINE

## 2021-09-05 PROCEDURE — 74011250636 HC RX REV CODE- 250/636: Performed by: EMERGENCY MEDICINE

## 2021-09-05 PROCEDURE — 74011000250 HC RX REV CODE- 250: Performed by: HOSPITALIST

## 2021-09-05 RX ORDER — CALCIUM CARBONATE 200(500)MG
200 TABLET,CHEWABLE ORAL
Status: DISCONTINUED | OUTPATIENT
Start: 2021-09-05 | End: 2021-09-08 | Stop reason: HOSPADM

## 2021-09-05 RX ORDER — GUAIFENESIN/DEXTROMETHORPHAN 100-10MG/5
10 SYRUP ORAL
Status: DISCONTINUED | OUTPATIENT
Start: 2021-09-05 | End: 2021-09-08 | Stop reason: HOSPADM

## 2021-09-05 RX ORDER — MAG HYDROX/ALUMINUM HYD/SIMETH 200-200-20
30 SUSPENSION, ORAL (FINAL DOSE FORM) ORAL
Status: DISCONTINUED | OUTPATIENT
Start: 2021-09-05 | End: 2021-09-08 | Stop reason: HOSPADM

## 2021-09-05 RX ORDER — DILTIAZEM HYDROCHLORIDE 240 MG/1
240 CAPSULE, COATED, EXTENDED RELEASE ORAL DAILY
Status: DISCONTINUED | OUTPATIENT
Start: 2021-09-06 | End: 2021-09-08 | Stop reason: HOSPADM

## 2021-09-05 RX ADMIN — GUAIFENESIN AND DEXTROMETHORPHAN 10 ML: 100; 10 SYRUP ORAL at 21:36

## 2021-09-05 RX ADMIN — AZITHROMYCIN MONOHYDRATE 250 MG: 250 TABLET ORAL at 03:59

## 2021-09-05 RX ADMIN — DIPHENHYDRAMINE HYDROCHLORIDE 25 MG: 25 CAPSULE ORAL at 21:36

## 2021-09-05 RX ADMIN — METHYLPREDNISOLONE SODIUM SUCCINATE 40 MG: 125 INJECTION, POWDER, FOR SOLUTION INTRAMUSCULAR; INTRAVENOUS at 21:33

## 2021-09-05 RX ADMIN — GUAIFENESIN 600 MG: 600 TABLET, EXTENDED RELEASE ORAL at 09:03

## 2021-09-05 RX ADMIN — BUDESONIDE 500 MCG: 0.5 INHALANT RESPIRATORY (INHALATION) at 07:26

## 2021-09-05 RX ADMIN — GUAIFENESIN 600 MG: 600 TABLET, EXTENDED RELEASE ORAL at 20:18

## 2021-09-05 RX ADMIN — IPRATROPIUM BROMIDE AND ALBUTEROL SULFATE 3 ML: .5; 3 SOLUTION RESPIRATORY (INHALATION) at 07:25

## 2021-09-05 RX ADMIN — METOPROLOL TARTRATE 50 MG: 50 TABLET, FILM COATED ORAL at 09:03

## 2021-09-05 RX ADMIN — DILTIAZEM HYDROCHLORIDE 120 MG: 120 CAPSULE, COATED, EXTENDED RELEASE ORAL at 09:03

## 2021-09-05 RX ADMIN — ASPIRIN 81 MG: 81 TABLET, CHEWABLE ORAL at 09:03

## 2021-09-05 RX ADMIN — ACETAMINOPHEN 650 MG: 325 TABLET ORAL at 03:59

## 2021-09-05 RX ADMIN — ARFORMOTEROL TARTRATE 15 MCG: 15 SOLUTION RESPIRATORY (INHALATION) at 20:00

## 2021-09-05 RX ADMIN — ENOXAPARIN SODIUM 40 MG: 40 INJECTION SUBCUTANEOUS at 17:37

## 2021-09-05 RX ADMIN — CEFTRIAXONE 1 G: 1 INJECTION, POWDER, FOR SOLUTION INTRAMUSCULAR; INTRAVENOUS at 13:37

## 2021-09-05 RX ADMIN — GUAIFENESIN AND DEXTROMETHORPHAN 10 ML: 100; 10 SYRUP ORAL at 02:50

## 2021-09-05 RX ADMIN — METHYLPREDNISOLONE SODIUM SUCCINATE 60 MG: 125 INJECTION, POWDER, FOR SOLUTION INTRAMUSCULAR; INTRAVENOUS at 13:37

## 2021-09-05 RX ADMIN — Medication 10 ML: at 21:34

## 2021-09-05 RX ADMIN — CETIRIZINE HYDROCHLORIDE 10 MG: 10 TABLET, FILM COATED ORAL at 09:03

## 2021-09-05 RX ADMIN — TELMISARTAN 80 MG: 80 TABLET ORAL at 09:03

## 2021-09-05 RX ADMIN — BUDESONIDE 500 MCG: 0.5 INHALANT RESPIRATORY (INHALATION) at 20:46

## 2021-09-05 RX ADMIN — IPRATROPIUM BROMIDE AND ALBUTEROL SULFATE 3 ML: .5; 3 SOLUTION RESPIRATORY (INHALATION) at 11:40

## 2021-09-05 RX ADMIN — SODIUM CHLORIDE 75 ML/HR: 900 INJECTION, SOLUTION INTRAVENOUS at 17:36

## 2021-09-05 RX ADMIN — ANTACID TABLETS 200 MG: 500 TABLET, CHEWABLE ORAL at 20:49

## 2021-09-05 RX ADMIN — IPRATROPIUM BROMIDE AND ALBUTEROL SULFATE 3 ML: .5; 3 SOLUTION RESPIRATORY (INHALATION) at 20:46

## 2021-09-05 RX ADMIN — METOPROLOL TARTRATE 50 MG: 50 TABLET, FILM COATED ORAL at 20:18

## 2021-09-05 RX ADMIN — IPRATROPIUM BROMIDE AND ALBUTEROL SULFATE 3 ML: .5; 3 SOLUTION RESPIRATORY (INHALATION) at 15:44

## 2021-09-05 RX ADMIN — METHYLPREDNISOLONE SODIUM SUCCINATE 60 MG: 125 INJECTION, POWDER, FOR SOLUTION INTRAMUSCULAR; INTRAVENOUS at 05:55

## 2021-09-05 RX ADMIN — ARFORMOTEROL TARTRATE 15 MCG: 15 SOLUTION RESPIRATORY (INHALATION) at 07:25

## 2021-09-05 RX ADMIN — Medication 10 ML: at 13:37

## 2021-09-05 RX ADMIN — SODIUM CHLORIDE 75 ML/HR: 900 INJECTION, SOLUTION INTRAVENOUS at 04:03

## 2021-09-05 RX ADMIN — ACETAMINOPHEN 650 MG: 325 TABLET ORAL at 21:36

## 2021-09-05 NOTE — PROGRESS NOTES
1925 Assumed patient care at this time. Report received from Chris Carbajal RN.   [address] Jayce Heart regarding something for indigestion. 2020 Assessment completed. 2049 PRN TUMS administered for indigestion. 2136 PRN Tylenol administered, Benadryl administered for sleep, Robitussin for cough. 0700 Bedside and verbal shift change report given to Neda Alpers RN (oncoming nurse) by Domi Gould RN (offgoing nurse). Report included the following information: SBAR, Kardex, MAR, and recent results.

## 2021-09-05 NOTE — PROGRESS NOTES
1900 Assumed patient care at this time. Report received from Yessenia Ramirez RN. Patient rating pain 0/10. Patient in bed in lowest position with wheels locked. Call light within reach. 2115 Shift assessment completed. 2202 PRN Benadryl and Tylenol administered. New Purewick applied. Wendy Capellan regarding patient coughing, unable to cough up her secretions, and pain in left hip present when patient coughs now. New order for cough syrup. 0250 PRN Robitussin administered for cough. 0359 PRN Tylenol administered for pain in leg/hip/groin. Declines a prn breathing treatment at this time. Bedside and verbal shift change report given to Jo Ann Lopez RN (oncoming nurse) by Vonnie Azul RN (offgoing nurse). Report included the following information: SBAR, Kardex, MAR, and recent results.

## 2021-09-05 NOTE — PROGRESS NOTES
Cardiology Progress Note        Patient: Sandoval Alfredo        Sex: female          DOA: 9/2/2021  YOB: 1950      Age:  79 y.o.        LOS:  LOS: 3 days      Patient seen and examined, chart reviewed. Assessment/Plan     Patient Active Problem List   Diagnosis Code    Hypertension I10    COPD exacerbation ([facility] [address] J44.1    Hyponatremia E87.1    Cholelithiasis K80.20    COPD with acute exacerbation ([facility] [address] J44.1    Tobacco use Z72.0    Troponin level elevated R77.8      Abnormal troponin: No evidence of ACS, could be due to demand ischemia   Cigarette smoker    Plan:    Continue Aspirin, Diltiazem and Metoprolol  Monitor Sodium and renal function  Plan discussed with patient and family member              Subjective:    cc:   breathing is improving. Complaining of shortness of breath on exertion      REVIEW OF SYSTEMS:     General: No fevers or chills. Cardiovascular:  Positive shortness of breath on exertion,No chest pain,No palpitations, No orthopnea, No PND, No leg swelling, No claudication  Pulmonary: No  dyspnea. Gastrointestinal: No nausea, vomiting, bleeding  Neurology: No Dizziness    Objective:      Visit Vitals  /65 (BP 1 Location: Right upper arm, BP Patient Position: At rest)   Pulse 74   Temp 98 °F (36.7 °C)   Resp 20   Ht 5' 7\" (1.702 m)   Wt 72.6 kg (160 lb)   SpO2 98%   BMI 25.06 kg/m²     Body mass index is 25.06 kg/m². Physical Exam:  General Appearance: Comfortable, not using accessory muscles of respiration. HEENT: GONZALO. HEAD: Atraumatic  NECK: No JVD, no thyroidomeglay. CAROTIDS: no bruit  LUNGS: Clear bilaterally. HEART: S1+S2 audible, no murmur, no pericardial rub.      ABD: Non-tender, BS Audible    NEUROLOGICAL: AAO times 3, No motor and sensory deficit    Medication:  Current Facility-Administered Medications   Medication Dose Route Frequency    guaiFENesin-dextromethorphan (ROBITUSSIN DM) 100-10 mg/5 mL syrup 10 mL  10 mL Oral Q6H PRN    [START ON 9/6/2021] dilTIAZem ER (CARDIZEM CD) capsule 240 mg  240 mg Oral DAILY    albuterol-ipratropium (DUO-NEB) 2.5 MG-0.5 MG/3 ML  3 mL Nebulization QID RT    [Held by provider] telmisartan (MICARDIS) tablet 80 mg  80 mg Oral DAILY    azithromycin (ZITHROMAX) tablet 250 mg  250 mg Oral DAILY    aspirin chewable tablet 81 mg  81 mg Oral DAILY    cetirizine (ZYRTEC) tablet 10 mg  10 mg Oral DAILY    metoprolol tartrate (LOPRESSOR) tablet 50 mg  50 mg Oral BID    albuterol-ipratropium (DUO-NEB) 2.5 MG-0.5 MG/3 ML  3 mL Nebulization Q15MIN PRN    cefTRIAXone (ROCEPHIN) 1 g in sterile water (preservative free) 10 mL IV syringe  1 g IntraVENous Q24H    sodium chloride (NS) flush 5-40 mL  5-40 mL IntraVENous Q8H    sodium chloride (NS) flush 5-40 mL  5-40 mL IntraVENous PRN    0.9% sodium chloride infusion  75 mL/hr IntraVENous CONTINUOUS    budesonide (PULMICORT) 500 mcg/2 ml nebulizer suspension  500 mcg Nebulization BID RT    methylPREDNISolone (PF) (Solu-MEDROL) injection 60 mg  60 mg IntraVENous Q8H    enoxaparin (LOVENOX) injection 40 mg  40 mg SubCUTAneous Q24H    arformoteroL (BROVANA) neb solution 15 mcg  15 mcg Nebulization BID RT    guaiFENesin ER (MUCINEX) tablet 600 mg  600 mg Oral Q12H    levalbuterol (XOPENEX) nebulizer soln 0.63 mg/3 mL  0.63 mg Nebulization Q4H PRN    acetaminophen (TYLENOL) tablet 650 mg  650 mg Oral Q6H PRN    diphenhydrAMINE (BENADRYL) capsule 25 mg  25 mg Oral QHS PRN               Lab/Data Reviewed:       Recent Labs     09/05/21  0342 09/04/21  0337 09/03/21  0834   WBC 8.0 9.6 6.5   HGB 11.8* 10.8* 11.7*   HCT 34.8* 31.6* 34.5*    219 212     Recent Labs     09/05/21  0342 09/04/21  0337 09/03/21  0834   * 125* 124*   K 4.6 4.8 4.3   CL 92* 91* 90*   CO2 27 26 26   * 129* 129*   BUN 33* 24* 12   CREA 1.52* 1.27 0.98   CA 9.2 8.5 9.3       Signed By: Claudette Poles, MD     September 5, 2021

## 2021-09-05 NOTE — PROGRESS NOTES
Hospitalist Progress Note    Patient: Leonarda Ashton MRN: [id number]  CSN: [id number]    YOB: 1950  Age: 79 y.o. Sex: female    DOA: 9/2/2021 LOS:  LOS: 3 days            Assessment/Plan   1. Acute COPD exacerbation- oxygen requirements improving, still wheezing  2. Slight troponin bump, no chest pain  3. Hyponatremia- improving  4. Tachycardia in setting of above- resolved  5. HTN- improved  6. Hypothyroidism  7. eleated BNP with out signs of pulmonary edema/ volume overload  8. Tobacco dependence     PLAN:  - continue solumedrol at current dose  - repeat troponin this AM, doubt ACS  - cont aspirin  - continue ceftriazone, zithromax, inhaled bronchodilators  - cont home antihypertensives  - mobilize w PT  - hold micardis and increase diltiazem given worsening renal function.   - I do not believe she is in pulmonary edema       Patient Active Problem List   Diagnosis Code    Hypertension I10    COPD exacerbation ([facility] [address] J44.1    Hyponatremia E87.1    Cholelithiasis K80.20    COPD with acute exacerbation (HCC) J44.1    Tobacco use Z72.0    Troponin level elevated R77.8               Subjective:    cc: cough  Pt w persistent cough/ wheezing. She notes \" a little better today\"  No chest pain/ pressure/ LE edema      REVIEW OF SYSTEMS:  General: No fevers or chills. Cardiovascular: No chest pain or pressure. No palpitations. Pulmonary: + shortness of breath. Gastrointestinal: No nausea, vomiting. Objective:        Vital signs/Intake and Output:  Visit Vitals  BP (!) 145/64 (BP 1 Location: Right upper arm, BP Patient Position: At rest)   Pulse 63   Temp 97.4 °F (36.3 °C)   Resp 20   Ht 5' 7\" (1.702 m)   Wt 72.6 kg (160 lb)   SpO2 99%   BMI 25.06 kg/m²     Current Shift:  No intake/output data recorded. Last three shifts:  09/03 1901 - 09/05 0700  In: 2569.4 [P.O.:1980; I.V.:589.4]  Out: 7060 [Urine:1650]    Body mass index is 25.06 kg/m².     Physical Exam:  GEN: Alert and oriented times three NAD  EYES: conjunctiva normal, lids with out lesions  HEENT: MMM, No thyromegaly, no lymphadenopathy  HEART: RRR +S1 +S2, no JVD, pulses 2+ distally  LUNGS: diffuse expiratory  wheezes, no rales or rhonchi  ABDOMEN: + BS, soft NT/ND no organomegaly,  No rebound  EXTREMITIES: No edema cyanosis, cap refill normal   SKIN: no rashes or skin breakdown, no nodules, normal turgor  Current Facility-Administered Medications   Medication Dose Route Frequency    guaiFENesin-dextromethorphan (ROBITUSSIN DM) 100-10 mg/5 mL syrup 10 mL  10 mL Oral Q6H PRN    albuterol-ipratropium (DUO-NEB) 2.5 MG-0.5 MG/3 ML  3 mL Nebulization QID RT    telmisartan (MICARDIS) tablet 80 mg  80 mg Oral DAILY    azithromycin (ZITHROMAX) tablet 250 mg  250 mg Oral DAILY    aspirin chewable tablet 81 mg  81 mg Oral DAILY    cetirizine (ZYRTEC) tablet 10 mg  10 mg Oral DAILY    metoprolol tartrate (LOPRESSOR) tablet 50 mg  50 mg Oral BID    albuterol-ipratropium (DUO-NEB) 2.5 MG-0.5 MG/3 ML  3 mL Nebulization Q15MIN PRN    cefTRIAXone (ROCEPHIN) 1 g in sterile water (preservative free) 10 mL IV syringe  1 g IntraVENous Q24H    sodium chloride (NS) flush 5-40 mL  5-40 mL IntraVENous Q8H    sodium chloride (NS) flush 5-40 mL  5-40 mL IntraVENous PRN    0.9% sodium chloride infusion  75 mL/hr IntraVENous CONTINUOUS    budesonide (PULMICORT) 500 mcg/2 ml nebulizer suspension  500 mcg Nebulization BID RT    methylPREDNISolone (PF) (Solu-MEDROL) injection 60 mg  60 mg IntraVENous Q8H    enoxaparin (LOVENOX) injection 40 mg  40 mg SubCUTAneous Q24H    arformoteroL (BROVANA) neb solution 15 mcg  15 mcg Nebulization BID RT    guaiFENesin ER (MUCINEX) tablet 600 mg  600 mg Oral Q12H    levalbuterol (XOPENEX) nebulizer soln 0.63 mg/3 mL  0.63 mg Nebulization Q4H PRN    dilTIAZem ER (CARDIZEM CD) capsule 120 mg  120 mg Oral DAILY    acetaminophen (TYLENOL) tablet 650 mg  650 mg Oral Q6H PRN    diphenhydrAMINE (BENADRYL) capsule 25 mg  25 mg Oral QHS PRN         All the patient's labs over the past 24 hours were reviewed both during my initial daily workflow process and at the time notated as \"note time\" in [address] [facility]. (It is not time stamped separately in this workflow.)  Select labs are listed below. Labs: Results:       Chemistry Recent Labs     09/05/21 0342 09/04/21 0337 09/03/21  0834 09/02/21  0933 09/02/21 0933   * 129* 129*   < > 119*   * 125* 124*   < > 124*   K 4.6 4.8 4.3   < > 3.9   CL 92* 91* 90*   < > 87*   CO2 27 26 26   < > 27   BUN 33* 24* 12   < > 10   CREA 1.52* 1.27 0.98   < > 1.11   CA 9.2 8.5 9.3   < > 9.8   AGAP 9 8 8   < > 10   BUCR 22* 19 12   < > 9*   AP  --   --   --   --  103   TP  --   --   --   --  8.1   ALB  --   --   --   --  4.2   GLOB  --   --   --   --  3.9   AGRAT  --   --   --   --  1.1    < > = values in this interval not displayed. CBC w/Diff Recent Labs     09/05/21 0342 09/04/21 0337 09/03/21  0834 09/02/21 0933 09/02/21 0933   WBC 8.0 9.6 6.5   < > 6.5   RBC 3.75* 3.41* 3.70*   < > 4.05*   HGB 11.8* 10.8* 11.7*   < > 13.1   HCT 34.8* 31.6* 34.5*   < > 36.3    219 212   < > 214   GRANS  --   --   --   --  57   LYMPH  --   --   --   --  23   EOS  --   --   --   --  12*    < > = values in this interval not displayed.       Cardiac Enzymes Recent Labs     09/04/21  1119 09/02/21 0933    191   CKND1 11.2* 4.6*                  Liver Enzymes Recent Labs     09/02/21 0933   TP 8.1   ALB 4.2             Procedures/imaging: see electronic medical records for all procedures/Xrays and details which were not copied into this note but were reviewed prior to creation of Plan    Imaging personally reviewed:  CXR reviewed        Yaritza Torres DO  Internal Medicine/Geriatrics

## 2021-09-05 NOTE — PROGRESS NOTES
[facility]  Pulmonary, Critical Care, and Sleep Medicine    Name: Abby Tripp MRN: [id number]   : 1950 Hospital: [facility] [place]    Date: 2021  Room: [address] [place] [address] Note                                              Consult requesting physician: Dr. Klely Lugo    Reason for Consult: acute hypoxemic respiratory failure, acute copd excerebration       IMPRESSION:     ·   Patient Active Problem List   Diagnosis Code    Hypertension I10    COPD exacerbation ([facility] J44.1    Hyponatremia E87.1    Cholelithiasis K80.20    COPD with acute exacerbation ([address] J44.1    Tobacco use Z72.0    Troponin level elevated R77.8 ·   hypoxemia   · Troponin elevation  · Diastolic CHF      · Code status: Full code       RECOMMENDATIONS:   Respiratory: acute hypoxemic respiratory failure in the setting of acute COPD exacerbation. Additional diastolic CHF and positive trop hypertensive heart ? Now on NC2L  wheezing better  2021  IMPRESSION  Questionable minimal interstitial infiltrates in each lung base, far from  definite. Otherwise negative. ABG  7.42/37/70/94  Ct of the chest on 2021  1. Diffuse bronchial wall thickening and scattered foci of endobronchial mucoid  impaction with several faint centrilobular distribution nodules which are very  likely infectious or inflammatory in nature. 2. Several small additional pulmonary nodules as described above. Please see  below guidelines for follow-up. 3. Cholelithiasis. V/Q scan on      IMPRESSION  No discrete perfusional abnormality to suggest the presence of pulmonary  embolism. At home only on DuoNeb. Smoker since age 15 still smoking half a pack a day previously up to 1 pack a pack a day about 50 packs history. Suspect at least moderate COPD. Moved here from [place]. No recent pulmonary function test or appropriate COPD therapy. Start Brovana and Pulmicort if tolerating well can switch to Symbicort for home.   Add asa and prn lasix  Patient had some cough after DuoNeb so we will switch to as needed. VQ scan negative for PE.  CT suggestive of chronic bronchitis bronchiectasis and newly developing pneumonia. PVL negative. Tachycardia echo pending. hao on cardizem          CT of the chest 9/2/2021  IMPRESSION    1. Diffuse bronchial wall thickening and scattered foci of endobronchial mucoid  impaction with several faint centrilobular distribution nodules which are very  likely infectious or inflammatory in nature. 2. Several small additional pulmonary nodules as described above. Please see  below guidelines for follow-up. 3. Cholelithiasis. Possible allergies checl ige and add loratidine consider singular     Keep SPO2 >=92%. HOB 30 degree elevation all the time. Aggressive pulmonary toileting. Aspiration precautions. Incentive spirometry. CVS: Already on Cardizem Echo pending  Positive trop  BNP elevated  Hypertensive heart  Add  positive trop and prn lasix   Already on ARB and bblocker   ID: acute COPD exacerbation currently acquired pneumonia antibiotics for 5 days  Was vaccinated COVID-19 negative  Sepsis bundle and protocol followed. Follow serial lactic acid, frequent BMP check, fluid resuscitation. Follow cultures. Deescalate antibiotic when appropriate. Hematology/Oncology: White blood cells and hemoglobin  Renal: Improving, GUERRERO  GI/:prophylaxis  Endocrine: Monitor BS. SSI. Neurology: awake  Toxicology: none  Pain/Sedation: prn  Skin/Wound: none  Electrolytes: Replace electrolytes   IVF: prn    Prophylaxis: DVT Prophylaxis: SCD/yes GI Prophylaxis: Protonix/  Restraints: none    Lines/Tubes: PIV*Other:  PT/OT eval and treat. OOB.    Advance Directive/Palliative Care: consulted    Will defer respective systems problem management to primary and other respective consultant and follow patient in ICU with primary and other medical team.  Further recommendations will be based on the patient's response to recommended treatment and results of the investigation ordered. Quality Care: PPI, DVT prophylaxis, HOB elevated, Infection control all reviewed and addressed. Care of plan d/w hospitalist team, RN, RT, MDR.  D/w patient and family (answered all questions to satisfaction). High complexity decision making was performed during the evaluation of this patient at high risk for decompensation with multiple organ involvement. Subjective/History of Present Illness:     Patient is a 79 y.o. female with PMHx significant for COPD only on DuoNeb at home, hypertension, increased BMI current smoker for over 50 years half a pack a day previously 1 pack. Presents with 3 days of shortness of breath chest tightness and wheezing. Tachycardic on admission, VQ scan negative PVL negative for PE. No DVT. Hypoxemic respiratory failure. Currently improving on bronchodilators but has some cough afternoon DuoNeb. Suspect at least moderate COPD, possibly new developing pneumonia small infiltrate at the bases, chills,. Covid test negative also vaccinated for Covid. Acute COPD exacerbation with hypoxemic respiratory failure. 9/5/2021:  Wean oxygen to off  New monitoring patient on the floor add Brovana and Pulmicort  wheezing chest tightness better  I will lower steroids  If Dc home please make sure she has prescription for brovana and pulmicort  I will be happy to see in office  Call me if new issues    I/O last 24 hrs: Intake/Output Summary (Last 24 hours) at 9/5/2021 1526  Last data filed at 9/5/2021 9391  Gross per 24 hour   Intake 1342 ml   Output 1300 ml   Net 42 ml         History taken from patient    Review of Systems:  A comprehensive review of systems was negative except for that written in the HPI.        Review of Systems:   HEENT: No epistaxis, no nasal drainage, no difficulty in swallowing, no redness in eyes  Respiratory: as above  Cardiovascular: no chest pain, no palpitations, no chronic leg edema, no syncope  Gastrointestinal: no abd pain, no vomiting, no diarrhea, no bleeding symptoms  Genitourinary: No urinary symptoms or hematuria  Musculoskeletal: Neg  Neurological: No focal weakness, no seizures, no headaches  Behvioral/Psych: No anxiety, no depression  General : No fever, no chills, no weight loss, no night sweats     Allergies   Allergen Reactions    Codeine Rash    Demerol [Meperidine] Rash    Sulfa (Sulfonamide Antibiotics) Rash      Past Medical History:   Diagnosis Date    Asthma     [facility]     Hypertension       History reviewed. No pertinent surgical history. Social History     Tobacco Use    Smoking status: Current Every Day Smoker     Packs/day: 0.50    Smokeless tobacco: Never Used   Substance Use Topics    Alcohol use: Not Currently      History reviewed. No pertinent family history. Prior to Admission medications    Medication Sig Start Date End Date Taking? Authorizing Provider   metoprolol tartrate (LOPRESSOR) 50 mg tablet Take 50 mg by mouth two (2) times a day. Yes Provider, Historical   telmisartan-hydroCHLOROthiazide (MICARDIS HCT) 80-25 mg per tablet Take 1 Tablet by mouth daily. Yes Provider, Historical   amLODIPine (NORVASC) 5 mg tablet Take 5 mg by mouth daily. Yes Provider, Historical   multivitamin (ONE A DAY) tablet Take 1 Tablet by mouth daily.    Yes Provider, Historical     Current Facility-Administered Medications   Medication Dose Route Frequency    [START ON 9/6/2021] dilTIAZem ER (CARDIZEM CD) capsule 240 mg  240 mg Oral DAILY    albuterol-ipratropium (DUO-NEB) 2.5 MG-0.5 MG/3 ML  3 mL Nebulization QID RT    [Held by provider] telmisartan (MICARDIS) tablet 80 mg  80 mg Oral DAILY    azithromycin (ZITHROMAX) tablet 250 mg  250 mg Oral DAILY    aspirin chewable tablet 81 mg  81 mg Oral DAILY    cetirizine (ZYRTEC) tablet 10 mg  10 mg Oral DAILY    metoprolol tartrate (LOPRESSOR) tablet 50 mg  50 mg Oral BID    cefTRIAXone (ROCEPHIN) 1 g in sterile water (preservative free) 10 mL IV syringe  1 g IntraVENous Q24H    sodium chloride (NS) flush 5-40 mL  5-40 mL IntraVENous Q8H    0.9% sodium chloride infusion  75 mL/hr IntraVENous CONTINUOUS    budesonide (PULMICORT) 500 mcg/2 ml nebulizer suspension  500 mcg Nebulization BID RT    methylPREDNISolone (PF) (Solu-MEDROL) injection 60 mg  60 mg IntraVENous Q8H    enoxaparin (LOVENOX) injection 40 mg  40 mg SubCUTAneous Q24H    arformoteroL (BROVANA) neb solution 15 mcg  15 mcg Nebulization BID RT    guaiFENesin ER (MUCINEX) tablet 600 mg  600 mg Oral Q12H         Objective:   Vital Signs:    Visit Vitals  /65 (BP 1 Location: Right upper arm, BP Patient Position: At rest)   Pulse 74   Temp 98 °F (36.7 °C)   Resp 20   Ht 5' 7\" (1.702 m)   Wt 72.6 kg (160 lb)   SpO2 98%   BMI 25.06 kg/m²       O2 Device: Nasal cannula   O2 Flow Rate (L/min): 1 l/min (DECREASED)   Temp (24hrs), Av °F (36.7 °C), Min:97.4 °F (36.3 °C), Max:98.3 °F (36.8 °C)       Intake/Output:   Last shift:      701 - 1900  In: 220 [P.O.:220]  Out: 900 [Urine:900]    Last 3 shifts: 1901 -  0700  In: 2569.4 [P.O.:1980; I.V.:589.4]  Out: 1650 [Urine:1650]      Intake/Output Summary (Last 24 hours) at 2021 1526  Last data filed at 2021 0904  Gross per 24 hour   Intake 1342 ml   Output 1300 ml   Net 42 ml       Last 3 Recorded Weights in this Encounter    21 0905 21 1743   Weight: 72.6 kg (160 lb) 72.6 kg (160 lb)             No results for input(s): PHI, PHI, POC2, PCO2I, PO2, PO2I, HCO3, HCO3I, FIO2, FIO2I in the last 72 hours. Physical Exam:     Impresson general : Alert, Awake, alert distress on minimal exertion  Head:   Normocephalic,atraumatic. ENT:   EOM  no scleral icterus, no pallor, no cyanosis. Nose:   No sinus tenderness  Throat:  Oropharynx ,mucosa, and tongue normal. No oral thrush. Neck:   Supple, symmetric. Lymph nodes. Trachea is line  Lung: Moderate air entry bilateral equal lateral rales. No rhonchi. Lateral extensive wheezing. No stridors. Yes prolongded expiration. yes accessory muscle use. Heart:   Regular cardia rate & rhythm. S1 S2 present. No murmur. No JVD. Abdomen:  Soft. NT. ND. +BS. No masses. liver  and spleen  Extremities:  No pedal edema. No cyanosis. No clubbing. Pulses: 2+ and symmetric in DP. Capillary refill: normal  Lymphatic:  neck and supraclavicular    Musculoskeletal: No joint swelling. No tenderness. Skin:   Lesion Color, texture, turgor normal. No rashes or lesions.        Data:       Recent Results (from the past 24 hour(s))   METABOLIC PANEL, BASIC    Collection Time: 09/05/21  3:42 AM   Result Value Ref Range    Sodium 128 (L) 136 - 145 mmol/L    Potassium 4.6 3.5 - 5.5 mmol/L    Chloride 92 (L) 100 - 111 mmol/L    CO2 27 21 - 32 mmol/L    Anion gap 9 3.0 - 18 mmol/L    Glucose 128 (H) 74 - 99 mg/dL    BUN 33 (H) 7.0 - 18 MG/DL    Creatinine 1.52 (H) 0.6 - 1.3 MG/DL    BUN/Creatinine ratio 22 (H) 12 - 20      GFR est AA 41 (L) >60 ml/min/1.73m2    GFR est non-AA 34 (L) >60 ml/min/1.73m2    Calcium 9.2 8.5 - 10.1 MG/DL   CBC W/O DIFF    Collection Time: 09/05/21  3:42 AM   Result Value Ref Range    WBC 8.0 4.6 - 13.2 K/uL    RBC 3.75 (L) 4.20 - 5.30 M/uL    HGB 11.8 (L) 12.0 - 16.0 g/dL    HCT 34.8 (L) 35.0 - 45.0 %    MCV 92.8 78.0 - 100.0 FL    MCH 31.5 24.0 - 34.0 PG    MCHC 33.9 31.0 - 37.0 g/dL    RDW 12.6 11.6 - 14.5 %    PLATELET 186 351 - 071 K/uL    MPV 10.7 9.2 - 11.8 FL   CARDIAC PANEL,(CK, CKMB & TROPONIN)    Collection Time: 09/05/21  7:45 AM   Result Value Ref Range    CK - MB 12.5 (H) <3.6 ng/ml    CK-MB Index 9.8 (H) 0.0 - 4.0 %     26 - 192 U/L    Troponin-I, QT 0.02 0.0 - 0.045 NG/ML         Chemistry Recent Labs     09/05/21  0342 09/04/21  0337 09/03/21  0834   * 129* 129*   * 125* 124*   K 4.6 4.8 4.3   CL 92* 91* 90*   CO2 27 26 26   BUN 33* 24* 12 CREA 1.52* 1.27 0.98   CA 9.2 8.5 9.3   AGAP 9 8 8   BUCR 22* 19 12        Lactic Acid No results found for: LAC  No results for input(s): LAC in the last 72 hours. Liver Enzymes Protein, total   Date Value Ref Range Status   09/02/2021 8.1 6.4 - 8.2 g/dL Final     Albumin   Date Value Ref Range Status   09/02/2021 4.2 3.4 - 5.0 g/dL Final     Globulin   Date Value Ref Range Status   09/02/2021 3.9 2.0 - 4.0 g/dL Final     A-G Ratio   Date Value Ref Range Status   09/02/2021 1.1 0.8 - 1.7   Final     Alk. phosphatase   Date Value Ref Range Status   09/02/2021 103 45 - 117 U/L Final     No results for input(s): TP, ALB, GLOB, AGRAT, AP, TBIL in the last 72 hours. No lab exists for component: SGOT, GPT, DBIL     CBC w/Diff Recent Labs     09/05/21  0342 09/04/21  0337 09/03/21  0834   WBC 8.0 9.6 6.5   RBC 3.75* 3.41* 3.70*   HGB 11.8* 10.8* 11.7*   HCT 34.8* 31.6* 34.5*    219 212        Cardiac Enzymes Lab Results   Component Value Date/Time     09/05/2021 07:45 AM    CKMB 12.5 (H) 09/05/2021 07:45 AM    CKND1 9.8 (H) 09/05/2021 07:45 AM    TROIQ 0.02 09/05/2021 07:45 AM        BNP No results found for: BNP, BNPP, XBNPT     Coagulation No results for input(s): PTP, INR, APTT, INREXT, INREXT in the last 72 hours. Thyroid  No results found for: T4, T3U, TSH, TSHEXT, TSHEXT    No results found for: T4     Urinalysis No results found for: COLOR, APPRN, SPGRU, REFSG, CANDE, PROTU, GLUCU, KETU, BILU, UROU, DERICK, LEUKU, GLUKE, EPSU, BACTU, WBCU, RBCU, CASTS, UCRY     Micro  Recent Labs     09/04/21  1014   CULT NO GROWTH AFTER 15 HOURS     Recent Labs     09/04/21  1014   CULT NO GROWTH AFTER 15 HOURS          Culture data during this hospitalization.    All Micro Results     Procedure Component Value Units Date/Time    CULTURE, RESPIRATORY/SPUTUM/BRONCH Daisy Murphy [[id number]] Collected: 09/04/21 1014    Order Status: Completed Specimen: Sputum Updated: 09/05/21 1322     Special Requests: NO SPECIAL REQUESTS        GRAM STAIN RARE WBCS SEEN               RARE EPITHELIAL CELLS SEEN            NO ORGANISMS SEEN        Culture result: NO GROWTH AFTER 15 HOURS       CULTURE, BLOOD [[id number]] Collected: 09/02/21 0933    Order Status: Completed Specimen: Blood Updated: 09/05/21 0859     Special Requests: NO SPECIAL REQUESTS        Culture result: NO GROWTH 3 DAYS       CULTURE, BLOOD [[id number]] Collected: 09/02/21 0933    Order Status: Completed Specimen: Blood Updated: 09/05/21 0859     Special Requests: NO SPECIAL REQUESTS        Culture result: NO GROWTH 3 DAYS       STREP Wendy Baas, URINE [[id number]] Collected: 09/03/21 1900    Order Status: Completed Specimen: Urine, random Updated: 09/04/21 1317     Strep pneumo Ag, urine Negative       LEGIONELLA PNEUMOPHILA AG, URINE [[id number]] Collected: 09/03/21 1900    Order Status: Completed Specimen: Urine, random Updated: 09/04/21 1317     Legionella Ag, urine Negative       COVID-19 RAPID TEST [[id number]] Collected: 09/02/21 0915    Order Status: Completed Specimen: Nasopharyngeal Updated: 09/02/21 0949     Specimen source Nasopharyngeal        COVID-19 rapid test Not detected        Comment: Rapid Abbott ID Now       Rapid NAAT:  The specimen is NEGATIVE for SARS-CoV-2, the novel coronavirus associated with COVID-19. Negative results should be treated as presumptive and, if inconsistent with clinical signs and symptoms or necessary for patient management, should be tested with an alternative molecular assay. Negative results do not preclude SARS-CoV-2 infection and should not be used as the sole basis for patient management decisions. This test has been authorized by the FDA under an Emergency Use Authorization (EUA) for use by authorized laboratories.    Fact sheet for Healthcare Providers: ConventionUpdate.co.nz  Fact sheet for Patients: ConventionUpdate.co.nz       Methodology: Isothermal Nucleic Acid Amplification                    PFT       Ultrasound       LE Doppler       ECHO       Images report reviewed by me:  CT (Most Recent) (CT chest reviewed by me) Results from Hospital Encounter encounter on 09/02/21    CT CHEST WO CONT    Narrative  EXAM: CT Chest    INDICATION: Tachycardia and hypoxia. .    COMPARISON: Radiographs same day. No prior CT imaging available for  review/comparison. TECHNIQUE: Axial CT imaging from the thoracic inlet through the diaphragm  without    intravenous contrast. Multiplanar reformations were generated. One or more dose reduction techniques were used on this CT: automated exposure  control, adjustment of the mAs and/or kVp according to patient size, and  iterative reconstruction techniques. The specific techniques used on this CT  exam have been documented in the patient's electronic medical record. Digital  Imaging and Communications in Medicine (DICOM) format image data are available  to nonaffiliated external healthcare facilities or entities on a secure, media  free, reciprocally searchable basis with patient authorization for at least a  12-month period after this study. _______________    FINDINGS:    LUNGS:  > No alveolar consolidation. No significant groundglass abnormality or  abnormal septal line thickening.  > Small cluster of 2 to 3 mm subpleural pulmonary nodules within the  peripheral right lower lobe (image numbers [id number]). > 2 to 3 mm fissural nodule along the left major fissure (image 69). > 3 mm nodule posterior left upper lobe (image 34)  > 4 mm medial left upper lobe nodule (image 67)    PLEURA: Unremarkable. AIRWAY: Diffuse bronchial wall thickening is present with several scattered foci  of endobronchial mucoid impaction. MEDIASTINUM: Included thyroid gland is unremarkable. Cardiac size normal.  Multivessel coronary arterial atherosclerosis. Thoracic aorta is normal in  course/caliber with diffuse atherosclerotic calcifications.     LYMPH NODES: No enlarged lymph nodes by size criteria. UPPER ABDOMEN: Excreted contrast is present within the renal collecting systems  from prior attempted PE protocol. Small gallstone within the gallbladder. OSSEOUS STRUCTURES: No acute or aggressive appearing osseous abnormality. Prominent Schmorl's node superior endplate B08.    OTHER:    _______________    Impression  1. Diffuse bronchial wall thickening and scattered foci of endobronchial mucoid  impaction with several faint centrilobular distribution nodules which are very  likely infectious or inflammatory in nature. 2. Several small additional pulmonary nodules as described above. Please see  below guidelines for follow-up. 3. Cholelithiasis.    ========    Fleischner Society pulmonary nodule guidelines (revised 2017): Multiple solid nodules <6 mm:  -Low risk for lung cancer: No follow-up. -High risk for lung cancer: Optional chest CT in 12 months. CXR reviewed by me:  XR (Most Recent). CXR  reviewed by me and compared with previous CXR Results from Hospital Encounter encounter on 09/02/21    XR CHEST PORT    Narrative  EXAM: CHEST RADIOGRAPH    CLINICAL INDICATION/HISTORY: ? CHF  -Additional: Shortness of breath with cough    COMPARISON: Yesterday    TECHNIQUE: Portable frontal view of the chest    _______________    FINDINGS:    SUPPORT DEVICES: None. HEART AND MEDIASTINUM: No appreciable cardiomegaly. Remaining mediastinal  contours within normal limits. LUNGS AND PLEURAL SPACES: There are some minimally prominent streaky markings in  each lung base. No airspace disease. No effusions. No vascular congestion. BONY THORAX AND SOFT TISSUES: Unremarkable.    _______________    Impression  Questionable minimal interstitial infiltrates in each lung base, far from  definite. Otherwise negative.            Kay Christiansen MD  9/5/2021

## 2021-09-05 NOTE — PROGRESS NOTES
0720 Bedside and Verbal shift change report given to WESLEY Martino RN (oncoming nurse) by IVANNA Squires RN (offgoing nurse). Report included the following information SBAR, Kardex, Intake/Output, and MAR. Pt in bed, call light in reach. 8393 Pt assessed. No signs of acute distress. Pt in bed.    1248 Pt assessed. No signs of acute distress. Pt in bed. 1737 Pt assessed. No signs of acute distress. Pt in bed. 1930 Bedside and Verbal shift change report given to IVANNA Squires RN (oncoming nurse) by [facility]. Sivakumar Martino RN (offgoing nurse). Report included the following information SBAR, Kardex, Intake/Output, MAR, and Recent Results. Pt in bed, call light in reach.

## 2021-09-06 ENCOUNTER — APPOINTMENT (OUTPATIENT)
Dept: GENERAL RADIOLOGY | Age: 71
DRG: 189 | End: 2021-09-06
Attending: INTERNAL MEDICINE
Payer: MEDICARE

## 2021-09-06 LAB
ANION GAP SERPL CALC-SCNC: 7 MMOL/L (ref 3–18)
BACTERIA SPEC CULT: NORMAL
BNP SERPL-MCNC: 3965 PG/ML (ref 0–900)
BUN SERPL-MCNC: 29 MG/DL (ref 7–18)
BUN/CREAT SERPL: 23 (ref 12–20)
CALCIUM SERPL-MCNC: 8.9 MG/DL (ref 8.5–10.1)
CHLORIDE SERPL-SCNC: 101 MMOL/L (ref 100–111)
CO2 SERPL-SCNC: 28 MMOL/L (ref 21–32)
CREAT SERPL-MCNC: 1.25 MG/DL (ref 0.6–1.3)
ERYTHROCYTE [DISTWIDTH] IN BLOOD BY AUTOMATED COUNT: 12.6 % (ref 11.6–14.5)
GLUCOSE SERPL-MCNC: 138 MG/DL (ref 74–99)
GRAM STN SPEC: NORMAL
HCT VFR BLD AUTO: 33 % (ref 35–45)
HGB BLD-MCNC: 11 G/DL (ref 12–16)
MCH RBC QN AUTO: 32.4 PG (ref 24–34)
MCHC RBC AUTO-ENTMCNC: 33.3 G/DL (ref 31–37)
MCV RBC AUTO: 97.1 FL (ref 78–100)
PLATELET # BLD AUTO: 184 K/UL (ref 135–420)
PMV BLD AUTO: 10.5 FL (ref 9.2–11.8)
POTASSIUM SERPL-SCNC: 4 MMOL/L (ref 3.5–5.5)
RBC # BLD AUTO: 3.4 M/UL (ref 4.2–5.3)
SERVICE CMNT-IMP: NORMAL
SODIUM SERPL-SCNC: 136 MMOL/L (ref 136–145)
WBC # BLD AUTO: 6 K/UL (ref 4.6–13.2)

## 2021-09-06 PROCEDURE — 94640 AIRWAY INHALATION TREATMENT: CPT

## 2021-09-06 PROCEDURE — 74011250636 HC RX REV CODE- 250/636: Performed by: EMERGENCY MEDICINE

## 2021-09-06 PROCEDURE — 97116 GAIT TRAINING THERAPY: CPT

## 2021-09-06 PROCEDURE — 74011250636 HC RX REV CODE- 250/636: Performed by: HOSPITALIST

## 2021-09-06 PROCEDURE — 97530 THERAPEUTIC ACTIVITIES: CPT

## 2021-09-06 PROCEDURE — 74011250637 HC RX REV CODE- 250/637: Performed by: INTERNAL MEDICINE

## 2021-09-06 PROCEDURE — 83880 ASSAY OF NATRIURETIC PEPTIDE: CPT

## 2021-09-06 PROCEDURE — 74011000250 HC RX REV CODE- 250: Performed by: HOSPITALIST

## 2021-09-06 PROCEDURE — 80048 BASIC METABOLIC PNL TOTAL CA: CPT

## 2021-09-06 PROCEDURE — 36415 COLL VENOUS BLD VENIPUNCTURE: CPT

## 2021-09-06 PROCEDURE — 77010033678 HC OXYGEN DAILY

## 2021-09-06 PROCEDURE — 74011250636 HC RX REV CODE- 250/636: Performed by: INTERNAL MEDICINE

## 2021-09-06 PROCEDURE — 65660000000 HC RM CCU STEPDOWN

## 2021-09-06 PROCEDURE — 74011250637 HC RX REV CODE- 250/637: Performed by: FAMILY MEDICINE

## 2021-09-06 PROCEDURE — 71046 X-RAY EXAM CHEST 2 VIEWS: CPT

## 2021-09-06 PROCEDURE — 74011250637 HC RX REV CODE- 250/637: Performed by: HOSPITALIST

## 2021-09-06 PROCEDURE — 85027 COMPLETE CBC AUTOMATED: CPT

## 2021-09-06 PROCEDURE — 74011000250 HC RX REV CODE- 250: Performed by: EMERGENCY MEDICINE

## 2021-09-06 PROCEDURE — 74011000250 HC RX REV CODE- 250: Performed by: INTERNAL MEDICINE

## 2021-09-06 RX ORDER — FUROSEMIDE 10 MG/ML
40 INJECTION INTRAMUSCULAR; INTRAVENOUS DAILY
Status: DISCONTINUED | OUTPATIENT
Start: 2021-09-07 | End: 2021-09-07

## 2021-09-06 RX ADMIN — IPRATROPIUM BROMIDE AND ALBUTEROL SULFATE 3 ML: .5; 3 SOLUTION RESPIRATORY (INHALATION) at 16:35

## 2021-09-06 RX ADMIN — CEFTRIAXONE 1 G: 1 INJECTION, POWDER, FOR SOLUTION INTRAMUSCULAR; INTRAVENOUS at 13:15

## 2021-09-06 RX ADMIN — DILTIAZEM HYDROCHLORIDE 240 MG: 240 CAPSULE, COATED, EXTENDED RELEASE ORAL at 08:48

## 2021-09-06 RX ADMIN — METOPROLOL TARTRATE 50 MG: 50 TABLET, FILM COATED ORAL at 20:23

## 2021-09-06 RX ADMIN — METHYLPREDNISOLONE SODIUM SUCCINATE 40 MG: 125 INJECTION, POWDER, FOR SOLUTION INTRAMUSCULAR; INTRAVENOUS at 13:15

## 2021-09-06 RX ADMIN — METOPROLOL TARTRATE 50 MG: 50 TABLET, FILM COATED ORAL at 08:49

## 2021-09-06 RX ADMIN — AZITHROMYCIN MONOHYDRATE 250 MG: 250 TABLET ORAL at 08:49

## 2021-09-06 RX ADMIN — ASPIRIN 81 MG: 81 TABLET, CHEWABLE ORAL at 08:48

## 2021-09-06 RX ADMIN — METHYLPREDNISOLONE SODIUM SUCCINATE 40 MG: 125 INJECTION, POWDER, FOR SOLUTION INTRAMUSCULAR; INTRAVENOUS at 21:56

## 2021-09-06 RX ADMIN — Medication 10 ML: at 21:56

## 2021-09-06 RX ADMIN — IPRATROPIUM BROMIDE AND ALBUTEROL SULFATE 3 ML: .5; 3 SOLUTION RESPIRATORY (INHALATION) at 11:17

## 2021-09-06 RX ADMIN — SODIUM CHLORIDE 75 ML/HR: 900 INJECTION, SOLUTION INTRAVENOUS at 06:12

## 2021-09-06 RX ADMIN — IPRATROPIUM BROMIDE AND ALBUTEROL SULFATE 3 ML: .5; 3 SOLUTION RESPIRATORY (INHALATION) at 19:33

## 2021-09-06 RX ADMIN — ENOXAPARIN SODIUM 40 MG: 40 INJECTION SUBCUTANEOUS at 17:48

## 2021-09-06 RX ADMIN — GUAIFENESIN 600 MG: 600 TABLET, EXTENDED RELEASE ORAL at 08:49

## 2021-09-06 RX ADMIN — SODIUM CHLORIDE 75 ML/HR: 900 INJECTION, SOLUTION INTRAVENOUS at 20:26

## 2021-09-06 RX ADMIN — Medication 10 ML: at 14:00

## 2021-09-06 RX ADMIN — GUAIFENESIN AND DEXTROMETHORPHAN 10 ML: 100; 10 SYRUP ORAL at 20:23

## 2021-09-06 RX ADMIN — ACETAMINOPHEN 650 MG: 325 TABLET ORAL at 21:55

## 2021-09-06 RX ADMIN — METHYLPREDNISOLONE SODIUM SUCCINATE 40 MG: 125 INJECTION, POWDER, FOR SOLUTION INTRAMUSCULAR; INTRAVENOUS at 06:10

## 2021-09-06 RX ADMIN — GUAIFENESIN 600 MG: 600 TABLET, EXTENDED RELEASE ORAL at 20:23

## 2021-09-06 RX ADMIN — ARFORMOTEROL TARTRATE 15 MCG: 15 SOLUTION RESPIRATORY (INHALATION) at 19:33

## 2021-09-06 RX ADMIN — DIPHENHYDRAMINE HYDROCHLORIDE 25 MG: 25 CAPSULE ORAL at 21:55

## 2021-09-06 RX ADMIN — GUAIFENESIN AND DEXTROMETHORPHAN 10 ML: 100; 10 SYRUP ORAL at 13:15

## 2021-09-06 RX ADMIN — BUDESONIDE 500 MCG: 0.5 INHALANT RESPIRATORY (INHALATION) at 19:33

## 2021-09-06 RX ADMIN — CETIRIZINE HYDROCHLORIDE 10 MG: 10 TABLET, FILM COATED ORAL at 08:48

## 2021-09-06 NOTE — PROGRESS NOTES
Hospitalist Progress Note    Patient: Fawn Clark MRN: [id number]  CSN: [id number]    YOB: 1950  Age: 79 y.o. Sex: female    DOA: 9/2/2021 LOS:  LOS: 4 days            Assessment/Plan   1. Acute COPD exacerbation- oxygen requirements continue to improve, still wheezing  2. Slight troponin bump, no chest pain, repeat neg  3. Hyponatremia-  resolved  4. Tachycardia in setting of above- resolved  5. HTN- improved  6. Hypothyroidism  7. eleated BNP with out signs of pulmonary edema/ volume overload  8. Tobacco dependence     PLAN:  - continue solumedrol at current dose  - cont aspirin  - continue ceftriazone, zithromax, inhaled bronchodilators  - cont home antihypertensives  - mobilize w PT  - continue to hold micardis and increase diltiazem given worsening renal function. - mobilize OOB           Patient Active Problem List   Diagnosis Code    Hypertension I10    COPD exacerbation ([facility] [address] J44.1    Hyponatremia E87.1    Cholelithiasis K80.20    COPD with acute exacerbation ([facility] [address] J44.1    Tobacco use Z72.0    Troponin level elevated R77.8               Subjective:    cc: \" Im getting a little better\"  Pt dyspnea improving  Still w wheezing& coughing  No cehst pain/ pressure  O2 requirements improving      REVIEW OF SYSTEMS:  General: No fevers or chills. Cardiovascular: No chest pain or pressure. No palpitations. Pulmonary: +shortness of breath. Gastrointestinal: No nausea, vomiting. Objective:        Vital signs/Intake and Output:  Visit Vitals  BP (!) 142/67   Pulse 66   Temp 98 °F (36.7 °C)   Resp 16   Ht 5' 7\" (1.702 m)   Wt 72.6 kg (160 lb)   SpO2 98%   BMI 25.06 kg/m²     Current Shift:  No intake/output data recorded. Last three shifts:  09/04 1901 - 09/06 0700  In: 0980 [P.O.:1242]  Out: 1800 [Urine:1800]    Body mass index is 25.06 kg/m².     Physical Exam:  GEN: Alert and oriented times three NAD  EYES: conjunctiva normal, lids with out lesions  HEENT: MMM, No thyromegaly, no lymphadenopathy  HEART: RRR +S1 +S2, no JVD, pulses 2+ distally  LUNGS: diffuse expiratory wheezes, no rhonchi  ABDOMEN: + BS, soft NT/ND no organomegaly,  No rebound  EXTREMITIES: No edema cyanosis, cap refill normal   SKIN: no rashes or skin breakdown, no nodules, normal turgor  Current Facility-Administered Medications   Medication Dose Route Frequency    guaiFENesin-dextromethorphan (ROBITUSSIN DM) 100-10 mg/5 mL syrup 10 mL  10 mL Oral Q6H PRN    dilTIAZem ER (CARDIZEM CD) capsule 240 mg  240 mg Oral DAILY    methylPREDNISolone (PF) (Solu-MEDROL) injection 40 mg  40 mg IntraVENous Q8H    alum-mag hydroxide-simeth (MYLANTA) oral suspension 30 mL  30 mL Oral Q4H PRN    calcium carbonate (TUMS) chewable tablet 200 mg [elemental]  200 mg Oral TID PRN    albuterol-ipratropium (DUO-NEB) 2.5 MG-0.5 MG/3 ML  3 mL Nebulization QID RT    [Held by provider] telmisartan (MICARDIS) tablet 80 mg  80 mg Oral DAILY    azithromycin (ZITHROMAX) tablet 250 mg  250 mg Oral DAILY    aspirin chewable tablet 81 mg  81 mg Oral DAILY    cetirizine (ZYRTEC) tablet 10 mg  10 mg Oral DAILY    metoprolol tartrate (LOPRESSOR) tablet 50 mg  50 mg Oral BID    albuterol-ipratropium (DUO-NEB) 2.5 MG-0.5 MG/3 ML  3 mL Nebulization Q15MIN PRN    cefTRIAXone (ROCEPHIN) 1 g in sterile water (preservative free) 10 mL IV syringe  1 g IntraVENous Q24H    sodium chloride (NS) flush 5-40 mL  5-40 mL IntraVENous Q8H    sodium chloride (NS) flush 5-40 mL  5-40 mL IntraVENous PRN    0.9% sodium chloride infusion  75 mL/hr IntraVENous CONTINUOUS    budesonide (PULMICORT) 500 mcg/2 ml nebulizer suspension  500 mcg Nebulization BID RT    enoxaparin (LOVENOX) injection 40 mg  40 mg SubCUTAneous Q24H    arformoteroL (BROVANA) neb solution 15 mcg  15 mcg Nebulization BID RT    guaiFENesin ER (MUCINEX) tablet 600 mg  600 mg Oral Q12H    levalbuterol (XOPENEX) nebulizer soln 0.63 mg/3 mL  0.63 mg Nebulization Q4H PRN    acetaminophen (TYLENOL) tablet 650 mg  650 mg Oral Q6H PRN    diphenhydrAMINE (BENADRYL) capsule 25 mg  25 mg Oral QHS PRN         All the patient's labs over the past 24 hours were reviewed both during my initial daily workflow process and at the time notated as \"note time\" in [facility]. (It is not time stamped separately in this workflow.)  Select labs are listed below.         Labs: Results:       Chemistry Recent Labs     09/06/21 0100 09/05/21 0342 09/04/21  0337   * 128* 129*    128* 125*   K 4.0 4.6 4.8    92* 91*   CO2 28 27 26   BUN 29* 33* 24*   CREA 1.25 1.52* 1.27   CA 8.9 9.2 8.5   AGAP 7 9 8   BUCR 23* 22* 19      CBC w/Diff Recent Labs     09/06/21 0100 09/05/21 0342 09/04/21  0337   WBC 6.0 8.0 9.6   RBC 3.40* 3.75* 3.41*   HGB 11.0* 11.8* 10.8*   HCT 33.0* 34.8* 31.6*    237 219      Cardiac Enzymes Recent Labs     09/05/21  0745 09/04/21  1119    156   CKND1 9.8* 11.2*                          Procedures/imaging: see electronic medical records for all procedures/Xrays and details which were not copied into this note but were reviewed prior to creation of Arthur [address], DO  Internal Medicine/Geriatrics

## 2021-09-06 NOTE — PROGRESS NOTES
CM on call:    D/C Plan: [employer] [facility] and physician follow up     CM spoke with pt to discuss transition of care. Pt lives with her son and daughter in law. Pt has a shower chair, RW, cane and a wheel chair available as needed. CM discussed therapy recommendation with pt for [facility]. Pt is agreeable to [facility]. CM offered FOC. Pt would like to use [employer]. CMS has been notified to assist.  Anticipate pt will transition home with [facility] and physician follow up when medically stable. CM to continue to follow and assist.    Care Management Interventions  PCP Verified by CM: Yes  Palliative Care Criteria Met (RRAT>21 & CHF Dx)?: No  Mode of Transport at Discharge:  Other (see comment) (Family)  Transition of Care Consult (CM Consult): Discharge Planning  Health Maintenance Reviewed: Yes  Current Support Network: Relative's Home  Confirm Follow Up Transport: Family  The Plan for Transition of Care is Related to the Following Treatment Goals : [facility] and physician follow up   The Patient and/or Patient Representative was Provided with a Choice of Provider and Agrees with the Discharge Plan?: Yes  Name of the Patient Representative Who was Provided with a Choice of Provider and Agrees with the Discharge Plan: pt  Freedom of Choice List was Provided with Basic Dialogue that Supports the Patient's Individualized Plan of Care/Goals, Treatment Preferences and Shares the Quality Data Associated with the Providers?: Yes  Discharge Location  Discharge Placement: Home with home health [facility])

## 2021-09-06 NOTE — PROGRESS NOTES
Problem: Mobility Impaired (Adult and Pediatric)  Goal: *Acute Goals and Plan of Care (Insert Text)  Description: Physical Therapy Goals   Initiated 9/4/2021 and to be accomplished within 5-7 day(s)  1. Patient will move from supine <> sit with S in prep for out of bed activity and change of position. 2.  Patient will perform sit<> stand with mod I/RW in prep for transfers/ambulation. 3.  Patient will transfer from bed <> chair with mod I/RW for time up in chair for completion of ADL activity. 4.  Patient will ambulate 80 feet with mod I/RW for improved functional mobility at discharge. 5.  Patient will ascend/descend 3-5 stairs with handrail(s) with minimal assist for home re-entry as needed as PTA. Outcome: Progressing Towards Goal    physical Therapy TREATMENT    Patient: Ross Dove [de-identified]79 y.o. female)  Date: 9/6/2021  Diagnosis: COPD with acute exacerbation ([facility] [address] [J44.1]  Acute respiratory failure with hypoxia ([facility] [address] [J96.01] COPD with acute exacerbation ([facility] [address]  Precautions: Fall   Chart, physical therapy assessment, plan of care and goals were reviewed. ASSESSMENT:  Pt seated EOB on arrival and reports feeling better. No pain at this time. Transfers completed with supervision/RW. Pt on 1 Lnc during session and IV in place. Pur wick removed at start of session. Gait distance increased to 116ft with RW/CG/SBA and one brief standing rest break required. Pt with occasional extended coughing episodes initially after Sit to stand and during GT. No c/o SOB, but requires ~1 minute to recover. Pt left up in chair with lunch meal on return to room. Progression toward goals:  [x]      Improving appropriately and progressing toward goals  []      Improving slowly and progressing toward goals  []      Not making progress toward goals and plan of care will be adjusted     PLAN:  Patient continues to benefit from skilled intervention to address the above impairments.   Continue treatment per established plan of care. Discharge Recommendations:  Home Physical Therapy  Further Equipment Recommendations for Discharge:  N/A     SUBJECTIVE:   Patient stated I'm feeling better.     OBJECTIVE DATA SUMMARY:   Critical Behavior:  Neurologic State: Alert  Orientation Level: Oriented X4  Cognition: Appropriate decision making  Safety/Judgement: Awareness of environment, Fall prevention  Functional Mobility Training:  Bed Mobility:  Scooting: Independent  Transfers:  Sit to Stand: Supervision  Stand to Sit: Supervision  Bed to Chair: Supervision;Stand-by assistance  Balance:  Sitting: Intact  Standing: Intact; With support  Ambulation/Gait Training:  Distance (ft): 116 Feet (ft)  Assistive Device: Gait belt;Walker, rolling  Ambulation - Level of Assistance: Stand-by assistance;Contact guard assistance  Gait Abnormalities: Decreased step clearance  Speed/Elvia: Pace decreased (<100 feet/min)  Interventions: Safety awareness training;Verbal cues  Pain:  Pain Scale 1: Numeric (0 - 10)  Pain Intensity 1: 0  Activity Tolerance:   Fair   Please refer to the flowsheet for vital signs taken during this treatment.   After treatment:   [x] Patient left in no apparent distress sitting up in chair  [] Patient left in no apparent distress in bed  [x] Call bell left within reach  [x] Nursing notified  [] Caregiver present  [] Bed alarm activated      Margret Pineda PT   Time Calculation: 24 mins

## 2021-09-06 NOTE — ROUTINE PROCESS
Bedside shift change report given to [facility] (oncoming nurse) by Kenny Douglas RN (offgoing nurse). Report included the following information SBAR, Kardex, Intake/Output, MAR and Recent Results.

## 2021-09-07 PROBLEM — R00.0 SINUS TACHYCARDIA: Status: ACTIVE | Noted: 2021-09-07

## 2021-09-07 LAB
ANION GAP SERPL CALC-SCNC: 8 MMOL/L (ref 3–18)
BUN SERPL-MCNC: 29 MG/DL (ref 7–18)
BUN/CREAT SERPL: 24 (ref 12–20)
CALCIUM SERPL-MCNC: 8.5 MG/DL (ref 8.5–10.1)
CHLORIDE SERPL-SCNC: 102 MMOL/L (ref 100–111)
CO2 SERPL-SCNC: 27 MMOL/L (ref 21–32)
CREAT SERPL-MCNC: 1.21 MG/DL (ref 0.6–1.3)
ERYTHROCYTE [DISTWIDTH] IN BLOOD BY AUTOMATED COUNT: 12.6 % (ref 11.6–14.5)
GLUCOSE SERPL-MCNC: 149 MG/DL (ref 74–99)
HCT VFR BLD AUTO: 32.1 % (ref 35–45)
HGB BLD-MCNC: 10.9 G/DL (ref 12–16)
MCH RBC QN AUTO: 32.5 PG (ref 24–34)
MCHC RBC AUTO-ENTMCNC: 34 G/DL (ref 31–37)
MCV RBC AUTO: 95.8 FL (ref 78–100)
PLATELET # BLD AUTO: 183 K/UL (ref 135–420)
PMV BLD AUTO: 11.1 FL (ref 9.2–11.8)
POTASSIUM SERPL-SCNC: 3.3 MMOL/L (ref 3.5–5.5)
RBC # BLD AUTO: 3.35 M/UL (ref 4.2–5.3)
SODIUM SERPL-SCNC: 137 MMOL/L (ref 136–145)
WBC # BLD AUTO: 7.2 K/UL (ref 4.6–13.2)

## 2021-09-07 PROCEDURE — 97116 GAIT TRAINING THERAPY: CPT

## 2021-09-07 PROCEDURE — 74011250636 HC RX REV CODE- 250/636: Performed by: INTERNAL MEDICINE

## 2021-09-07 PROCEDURE — 36415 COLL VENOUS BLD VENIPUNCTURE: CPT

## 2021-09-07 PROCEDURE — 65660000000 HC RM CCU STEPDOWN

## 2021-09-07 PROCEDURE — 74011250636 HC RX REV CODE- 250/636: Performed by: HOSPITALIST

## 2021-09-07 PROCEDURE — 74011250637 HC RX REV CODE- 250/637: Performed by: HOSPITALIST

## 2021-09-07 PROCEDURE — 74011000250 HC RX REV CODE- 250: Performed by: HOSPITALIST

## 2021-09-07 PROCEDURE — 80048 BASIC METABOLIC PNL TOTAL CA: CPT

## 2021-09-07 PROCEDURE — 74011250637 HC RX REV CODE- 250/637: Performed by: INTERNAL MEDICINE

## 2021-09-07 PROCEDURE — 85027 COMPLETE CBC AUTOMATED: CPT

## 2021-09-07 PROCEDURE — 74011000250 HC RX REV CODE- 250: Performed by: INTERNAL MEDICINE

## 2021-09-07 PROCEDURE — 94640 AIRWAY INHALATION TREATMENT: CPT

## 2021-09-07 PROCEDURE — 74011250636 HC RX REV CODE- 250/636: Performed by: EMERGENCY MEDICINE

## 2021-09-07 PROCEDURE — 74011250637 HC RX REV CODE- 250/637: Performed by: FAMILY MEDICINE

## 2021-09-07 PROCEDURE — 77010033678 HC OXYGEN DAILY

## 2021-09-07 PROCEDURE — 74011000250 HC RX REV CODE- 250: Performed by: EMERGENCY MEDICINE

## 2021-09-07 RX ORDER — IPRATROPIUM BROMIDE AND ALBUTEROL SULFATE 2.5; .5 MG/3ML; MG/3ML
3 SOLUTION RESPIRATORY (INHALATION)
Status: ON HOLD | COMMUNITY
End: 2021-09-08 | Stop reason: SDUPTHER

## 2021-09-07 RX ORDER — FUROSEMIDE 10 MG/ML
40 INJECTION INTRAMUSCULAR; INTRAVENOUS DAILY
Status: DISCONTINUED | OUTPATIENT
Start: 2021-09-08 | End: 2021-09-08 | Stop reason: HOSPADM

## 2021-09-07 RX ORDER — DIPHENHYDRAMINE HCL 25 MG
25 TABLET ORAL
COMMUNITY
End: 2022-04-24

## 2021-09-07 RX ORDER — CETIRIZINE HCL 10 MG
10 TABLET ORAL DAILY
COMMUNITY

## 2021-09-07 RX ADMIN — IPRATROPIUM BROMIDE AND ALBUTEROL SULFATE 3 ML: .5; 3 SOLUTION RESPIRATORY (INHALATION) at 08:36

## 2021-09-07 RX ADMIN — BUDESONIDE 500 MCG: 0.5 INHALANT RESPIRATORY (INHALATION) at 08:36

## 2021-09-07 RX ADMIN — IPRATROPIUM BROMIDE AND ALBUTEROL SULFATE 3 ML: .5; 3 SOLUTION RESPIRATORY (INHALATION) at 15:20

## 2021-09-07 RX ADMIN — GUAIFENESIN 600 MG: 600 TABLET, EXTENDED RELEASE ORAL at 23:12

## 2021-09-07 RX ADMIN — IPRATROPIUM BROMIDE AND ALBUTEROL SULFATE 3 ML: .5; 3 SOLUTION RESPIRATORY (INHALATION) at 11:52

## 2021-09-07 RX ADMIN — METHYLPREDNISOLONE SODIUM SUCCINATE 40 MG: 125 INJECTION, POWDER, FOR SOLUTION INTRAMUSCULAR; INTRAVENOUS at 05:36

## 2021-09-07 RX ADMIN — CEFTRIAXONE 1 G: 1 INJECTION, POWDER, FOR SOLUTION INTRAMUSCULAR; INTRAVENOUS at 15:28

## 2021-09-07 RX ADMIN — CETIRIZINE HYDROCHLORIDE 10 MG: 10 TABLET, FILM COATED ORAL at 08:57

## 2021-09-07 RX ADMIN — AZITHROMYCIN MONOHYDRATE 250 MG: 250 TABLET ORAL at 08:56

## 2021-09-07 RX ADMIN — ARFORMOTEROL TARTRATE 15 MCG: 15 SOLUTION RESPIRATORY (INHALATION) at 08:36

## 2021-09-07 RX ADMIN — METOPROLOL TARTRATE 50 MG: 50 TABLET, FILM COATED ORAL at 08:56

## 2021-09-07 RX ADMIN — Medication 10 ML: at 14:00

## 2021-09-07 RX ADMIN — GUAIFENESIN 600 MG: 600 TABLET, EXTENDED RELEASE ORAL at 08:56

## 2021-09-07 RX ADMIN — FUROSEMIDE 40 MG: 10 INJECTION, SOLUTION INTRAMUSCULAR; INTRAVENOUS at 08:56

## 2021-09-07 RX ADMIN — METHYLPREDNISOLONE SODIUM SUCCINATE 40 MG: 125 INJECTION, POWDER, FOR SOLUTION INTRAMUSCULAR; INTRAVENOUS at 23:12

## 2021-09-07 RX ADMIN — ENOXAPARIN SODIUM 40 MG: 40 INJECTION SUBCUTANEOUS at 17:41

## 2021-09-07 RX ADMIN — ASPIRIN 81 MG: 81 TABLET, CHEWABLE ORAL at 08:56

## 2021-09-07 RX ADMIN — GUAIFENESIN AND DEXTROMETHORPHAN 10 ML: 100; 10 SYRUP ORAL at 05:36

## 2021-09-07 RX ADMIN — IPRATROPIUM BROMIDE AND ALBUTEROL SULFATE 3 ML: .5; 3 SOLUTION RESPIRATORY (INHALATION) at 20:41

## 2021-09-07 RX ADMIN — BUDESONIDE 500 MCG: 0.5 INHALANT RESPIRATORY (INHALATION) at 20:41

## 2021-09-07 RX ADMIN — DIPHENHYDRAMINE HYDROCHLORIDE 25 MG: 25 CAPSULE ORAL at 23:11

## 2021-09-07 RX ADMIN — ARFORMOTEROL TARTRATE 15 MCG: 15 SOLUTION RESPIRATORY (INHALATION) at 20:41

## 2021-09-07 RX ADMIN — METHYLPREDNISOLONE SODIUM SUCCINATE 40 MG: 125 INJECTION, POWDER, FOR SOLUTION INTRAMUSCULAR; INTRAVENOUS at 15:28

## 2021-09-07 RX ADMIN — Medication 10 ML: at 23:12

## 2021-09-07 RX ADMIN — DILTIAZEM HYDROCHLORIDE 240 MG: 240 CAPSULE, COATED, EXTENDED RELEASE ORAL at 08:56

## 2021-09-07 RX ADMIN — ACETAMINOPHEN 650 MG: 325 TABLET ORAL at 23:11

## 2021-09-07 RX ADMIN — METOPROLOL TARTRATE 50 MG: 50 TABLET, FILM COATED ORAL at 23:12

## 2021-09-07 NOTE — ADVANCED PRACTICE NURSE
[facility]  Clinical Nurse Specialist Rounding Note      NAME: London Bal  MRN: [id number]  AGE: 79 y.o., : 1950  DATE OF ADMISSION: 2021  Rounding Date and Time: 2021 12:48 PM   Attending Provider: David Gaines MD  Reason for Admission: Breathing Problem (hx copd and asthma )  Primary Diagnosis: COPD with acute exacerbation ([facility] [address]   Code Status: Full Code  Allergies: Allergies   Allergen Reactions    Codeine Rash    Demerol [Meperidine] Rash    Sulfa (Sulfonamide Antibiotics) Rash       Assessment/Plan:  1. COPD exacerbation per Dr. Orquidea Sloan & Dr. Iman Buck. Today has wheezing in posterior and anterior lung fields, but reports that she is feeling much better than last week. Denies chest pain. a. Inpt medications: Solu-medrol 40 mg, Brovana neb, Pulmicort neb, levalbuterol prn, Duo-neb  b. Outpt medications: Duo-neb, albuterol. i. Inhaler technique. Symbicort - per Dr. Iman Buck,   ii. Pt is able to teach back handihaler technique.   iii. Reinforced Mouth rinse after usage to decrease likelyhood of thrush.   c. Oxygen therapy: Currently on 1 L/min, and is maintaining >94% at rest. Does not use oxygen at home. d. Nebulizer: Owns one and uses Duo-nebs at home. e. Smoking hx: is now 1 - 0.5 pack/day per.   i. Cessation - She is enthusiastic to quit and intends to continue doing so after discharge. Encouraged. f. Follow-up: Will follow-up with [facility]. pulmonology for PFT's. Informed CM to expect pulmonology follow-up.   g. Nursing to:  i. Continue ambulating as tolerated TID  ii. Incentive spirometry 10/rep per hour as toleratied. h. Recommendation to provider: Pt to follow-up with pulmonology for PFT. If possible, primary to write for inhalers so that pt can fill before discharge. Follow-up smoking cessation as outpt or during readmission. 2. Medication history. PTA med list updated. a. Added:  i. Duo-neb  ii. Zyrtec 10 mg daily PO  iii.  Benadryl 25 mg q6h PO  b. Flagged for removal as pt reports she stopped taking:  i. Amlodipine  c. Recommendation to provider: Review Amlodipine. 3. Care transition of older adult. a. Medication regimen: Telmisartan has notably been held per provider on MAR.   b. Code status: Full code, has been for ACP. c. Elimination: Denies change in void and bowel movement pattern  i. Currently using female external cath  d. Appetite: Eating well.   e. Mobility: Minimal assistance with walker. Physical therapy is following.   f. Skin: Intact. g. Vaccines: reports taking Influenza, pneumonia, and COVID vaccines. Subjective:  Chief Complaint: Short of Breath  Verbatim: \"I feel a lot better today\"    HPI: Asha Kaur is a 79 y.o. female who presents with shortness of breath. She came through the ED last week for progressive shortness of breath, and was admitted by Dr. Kruse Forward. She has since then been treated with Solu-medrol, Brovana, and Pulmicort. Dr. Ricardo Low from pulmonology has been consulted for respiratory failure and Dr. Amira Concepcion from cardiology was consulted for elevated proBNP. Her history includes high cholesterol, high blood pressure, hip fracture, and COPD. She denies being diabetic or any other heart problems aside from high blood pressure. Therefore, this CNS has seen this pt today for COPD and inhaler teaching. Today, she states that she feels much better and has been able to ambulate with physical therapy. She understands that she continues to have some wheezing. Regarding her COPD, she states that she has realized she has had it for a long time. She attributes it to smoking since she was a teenager. She feels that this episode might have been brought on due to her seasonal allergies. The are especially worse this year since she recently moved from [place] and is adjusting to [place] pollen and climate.  She takes Zyrtec and benadryl to manage her allergies; for usual COPD management, she has a nebulizer and Duo-nebs at home. She is confident in her mobility, able to walk \"a quarter of a block\" before becoming tired. Currently she has a PCP, Dr. Minnie Newton. Having recently moved here, she has no pulmonologist or cardiologist, but hopes to follow with [facility]. here. She hasn't had a recent pulmonary function test.     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 She states the dogs never bothered her allergies. She states she's taken a flu shot, pnemonia shot, and 2 COVID-19 shots. COPD Assessment Test (CAT)     I never cough      0[] 1[] 2[] 3[] 4[] 5[x] I cough all the time   I have no phlegm (mucus) in my chest at all  0[] 1[] 2[] 3[x] 4[] 5[] My chest is completely full of phlegm (mucus)   My chest does not feel tight at all   0[] 1[] 2[] 3[] 4[x] 5[]  My chest feels very tight   When I walk up a hill or one flight of stairs  0[] 1[] 2[] 3[] 4[x] 5[] When I walk up a hill or one flight of stairs I am very breathless  I am not breathless    I am no limited doing any activities at home  0[] 1[] 2[] 3[] 4[x] 5[] I am very limited doing activities at home   I am confident leaving my home despite   0[] 1[] 2[x] 3[] 4[] 5[] I am not at all confident leaving my home because of my lung condition  my lung condition   I sleep soundly    0[] 1[] 2[] 3[x] 4[] 5[] I don't sleep soundly because of my lung condition   I have lots of energy    0[] 1[] 2[] 3[x] 4[] 5[] I have no energy at all    Total: 28         Hx:   Past Medical History:   Diagnosis Date    Asthma     Chronic obstructive pulmonary disease ([facility] [address]     Hypertension        Objective:    Physical Assessment:    General: Alert and no distress. Oriented to Person, Place, Time and Situation    HEENT: PERRLA. Not hard of hearing. Respiratory: positive for cough, wheezing or dyspnea on exertion    Cardiac: /83. Regular rhythm. No murmur. GI: Bowel sounds active. No tenderness. : Void status is voiding well without difficulty.  Using external female catheter  Skin: Skin color, texture, turgor normal. No rashes or lesions   Extremities: Sensation grossly intact. No edema in any extremity.      Vitals:  Patient Vitals for the past 12 hrs:   Temp Pulse Resp BP SpO2   09/07/21 0738 97.7 °F (36.5 °C) 70 16 (!) 173/83 98 %   09/07/21 0402 97.6 °F (36.4 °C) 72 18 (!) 161/85 99 %   09/07/21 0007 97.6 °F (36.4 °C) 62 18 (!) 148/71 97 %     Jm SPICES: Overall Assessment Tool for Older Adults    SPICES   Evidence of    Sleep disorder   []Yes  [x]No  Problems with Eating/Feed []Yes  [x]No  Incontinence   []Yes  [x]No  Confusion   []Yes  [x]No  Evidence of Falls  []Yes  [x]No  Skin Breakdown  []Yes  [x]No         Pertinent labs:  Recent Results (from the past 12 hour(s))   METABOLIC PANEL, BASIC    Collection Time: 09/07/21  2:32 AM   Result Value Ref Range    Sodium 137 136 - 145 mmol/L    Potassium 3.3 (L) 3.5 - 5.5 mmol/L    Chloride 102 100 - 111 mmol/L    CO2 27 21 - 32 mmol/L    Anion gap 8 3.0 - 18 mmol/L    Glucose 149 (H) 74 - 99 mg/dL    BUN 29 (H) 7.0 - 18 MG/DL    Creatinine 1.21 0.6 - 1.3 MG/DL    BUN/Creatinine ratio 24 (H) 12 - 20      GFR est AA 53 (L) >60 ml/min/1.73m2    GFR est non-AA 44 (L) >60 ml/min/1.73m2    Calcium 8.5 8.5 - 10.1 MG/DL   CBC W/O DIFF    Collection Time: 09/07/21  2:32 AM   Result Value Ref Range    WBC 7.2 4.6 - 13.2 K/uL    RBC 3.35 (L) 4.20 - 5.30 M/uL    HGB 10.9 (L) 12.0 - 16.0 g/dL    HCT 32.1 (L) 35.0 - 45.0 %    MCV 95.8 78.0 - 100.0 FL    MCH 32.5 24.0 - 34.0 PG    MCHC 34.0 31.0 - 37.0 g/dL    RDW 12.6 11.6 - 14.5 %    PLATELET 241 954 - 321 K/uL    MPV 11.1 9.2 - 11.8 FL           Current Facility-Administered Medications:     furosemide (LASIX) injection 40 mg, 40 mg, IntraVENous, DAILY, Ashlyn Rosas MD, 40 mg at 09/07/21 0856    guaiFENesin-dextromethorphan (ROBITUSSIN DM) 100-10 mg/5 mL syrup 10 mL, 10 mL, Oral, Q6H PRN, Alexi ALFARO MD, 10 mL at 09/07/21 0536    dilTIAZem ER (CARDIZEM CD) capsule 240 mg, 240 mg, Oral, DAILY, Krish Cochran, DO, 240 mg at 09/07/21 0856    methylPREDNISolone (PF) (Solu-MEDROL) injection 40 mg, 40 mg, IntraVENous, Q8H, Shea Rodgers MD, 40 mg at 09/07/21 0536    alum-mag hydroxide-simeth (MYLANTA) oral suspension 30 mL, 30 mL, Oral, Q4H PRN, Austin Mc MD    calcium carbonate (TUMS) chewable tablet 200 mg [elemental], 200 mg, Oral, TID PRN, Kitty ALFARO MD, 200 mg at 09/05/21 2049    albuterol-ipratropium (DUO-NEB) 2.5 MG-0.5 MG/3 ML, 3 mL, Nebulization, QID RT, Krish Cochran DO, 3 mL at 09/07/21 0836    [Held by provider] telmisartan (MICARDIS) tablet 80 mg, 80 mg, Oral, DAILY, Krish Cochran DO, 80 mg at 09/05/21 0717    azithromycin (ZITHROMAX) tablet 250 mg, 250 mg, Oral, DAILY, Krish Cochran DO, 250 mg at 09/07/21 3926    aspirin chewable tablet 81 mg, 81 mg, Oral, DAILY, Shea Rodgers MD, 81 mg at 09/07/21 0856    cetirizine (ZYRTEC) tablet 10 mg, 10 mg, Oral, DAILY, Shea Rodgers MD, 10 mg at 09/07/21 0857    metoprolol tartrate (LOPRESSOR) tablet 50 mg, 50 mg, Oral, BID, Sultana Franklin MD, 50 mg at 09/07/21 0856    albuterol-ipratropium (DUO-NEB) 2.5 MG-0.5 MG/3 ML, 3 mL, Nebulization, Q15MIN PRN, Marie ECHEVARRIA DO, 3 mL at 09/04/21 0017    cefTRIAXone (ROCEPHIN) 1 g in sterile water (preservative free) 10 mL IV syringe, 1 g, IntraVENous, Q24H, Mick Graff DO, 1 g at 09/06/21 1315    sodium chloride (NS) flush 5-40 mL, 5-40 mL, IntraVENous, Q8H, Sultana Franklin MD, 10 mL at 09/06/21 2156    sodium chloride (NS) flush 5-40 mL, 5-40 mL, IntraVENous, PRN, Donnamaria Boas, Mohammed A, MD    budesonide (PULMICORT) 500 mcg/2 ml nebulizer suspension, 500 mcg, Nebulization, BID RT, Donnamaria Boas, Mohammed A, MD, 500 mcg at 09/07/21 0836    enoxaparin (LOVENOX) injection 40 mg, 40 mg, SubCUTAneous, Q24H, Sultana Franklin MD, 40 mg at 09/06/21 1748    arformoteroL (BROVANA) neb solution 15 mcg, 15 mcg, Nebulization, BID RT, Rigoberto Earline Kerns MD, 15 mcg at 09/07/21 0836    guaiFENesin ER (MUCINEX) tablet 600 mg, 600 mg, Oral, Q12H, Sultana Franklin MD, 600 mg at 09/07/21 0856    levalbuterol (XOPENEX) nebulizer soln 0.63 mg/3 mL, 0.63 mg, Nebulization, Q4H PRN, Sultana Russo MD    acetaminophen (TYLENOL) tablet 650 mg, 650 mg, Oral, Q6H PRN, Nile Haro MD, 650 mg at 09/06/21 2155    diphenhydrAMINE (BENADRYL) capsule 25 mg, 25 mg, Oral, QHS PRN, Nile Haro MD, 25 mg at 09/06/21 2155    Kyra West, MSN, RN, [facility]    Clinical Nurse Specialist  [address], [address] [place] [address]  Office: [phone]   [address]: [phone]

## 2021-09-07 NOTE — PROGRESS NOTES
Cardiology Progress Note        Patient: Joshua Cardenas        Sex: female          DOA: 9/2/2021  YOB: 1950      Age:  79 y.o.        LOS:  LOS: 5 days   Assessment/Plan     Principal Problem:    COPD with acute exacerbation ([facility] (9/2/2021)    Active Problems:    COPD exacerbation ([facility] ()      Hyponatremia (9/2/2021)      Cholelithiasis (9/2/2021)      Tobacco use (9/2/2021)      Troponin level elevated (9/4/2021)        Plan:       shortness of breath due to COPD exacerbation  Hypertension resume telmisartan when stable  Tachycardia continue with Cardizem and metoprolol  Minimal troponin elevation without ACS  I do not think she has pulmonary edema, reduce the Lasix from 40 mg twice daily to once a day  Discussed with patient. Subjective:    cc:    Shortness of breath        REVIEW OF SYSTEMS:     General: No fevers or chills. Cardiovascular: No chest pain or pressure. No palpitations. No ankle swelling  Pulmonary: No SOB, orthopnea, PND  Gastrointestinal: No nausea, vomiting or diarrhea      Objective:      Visit Vitals  BP (!) 144/76   Pulse 67   Temp 97.9 °F (36.6 °C)   Resp 16   Ht 5' 7\" (1.702 m)   Wt 72.6 kg (160 lb)   SpO2 95%   BMI 25.06 kg/m²     Body mass index is 25.06 kg/m². Physical Exam:  General Appearance:   Stable  NECK: No JVD, no thyroidomeglay. LUNGS:  Mild expiratory rhonchi /wheezing   HEART: S1+S2 audible,    ABD: Non-tender, BS Audible    EXT: No edema, and no cysnosis. VASCULAR EXAM: Pulses are intact. PSYCHIATRIC EXAM: Mood is appropriate.     Medication:  Current Facility-Administered Medications   Medication Dose Route Frequency    [START ON 9/8/2021] furosemide (LASIX) injection 40 mg  40 mg IntraVENous DAILY    guaiFENesin-dextromethorphan (ROBITUSSIN DM) 100-10 mg/5 mL syrup 10 mL  10 mL Oral Q6H PRN    dilTIAZem ER (CARDIZEM CD) capsule 240 mg  240 mg Oral DAILY    methylPREDNISolone (PF) (Solu-MEDROL) injection 40 mg  40 mg IntraVENous Q8H    alum-mag hydroxide-simeth (MYLANTA) oral suspension 30 mL  30 mL Oral Q4H PRN    calcium carbonate (TUMS) chewable tablet 200 mg [elemental]  200 mg Oral TID PRN    albuterol-ipratropium (DUO-NEB) 2.5 MG-0.5 MG/3 ML  3 mL Nebulization QID RT    [Held by provider] telmisartan (MICARDIS) tablet 80 mg  80 mg Oral DAILY    azithromycin (ZITHROMAX) tablet 250 mg  250 mg Oral DAILY    aspirin chewable tablet 81 mg  81 mg Oral DAILY    cetirizine (ZYRTEC) tablet 10 mg  10 mg Oral DAILY    metoprolol tartrate (LOPRESSOR) tablet 50 mg  50 mg Oral BID    albuterol-ipratropium (DUO-NEB) 2.5 MG-0.5 MG/3 ML  3 mL Nebulization Q15MIN PRN    cefTRIAXone (ROCEPHIN) 1 g in sterile water (preservative free) 10 mL IV syringe  1 g IntraVENous Q24H    sodium chloride (NS) flush 5-40 mL  5-40 mL IntraVENous Q8H    sodium chloride (NS) flush 5-40 mL  5-40 mL IntraVENous PRN    budesonide (PULMICORT) 500 mcg/2 ml nebulizer suspension  500 mcg Nebulization BID RT    enoxaparin (LOVENOX) injection 40 mg  40 mg SubCUTAneous Q24H    arformoteroL (BROVANA) neb solution 15 mcg  15 mcg Nebulization BID RT    guaiFENesin ER (MUCINEX) tablet 600 mg  600 mg Oral Q12H    levalbuterol (XOPENEX) nebulizer soln 0.63 mg/3 mL  0.63 mg Nebulization Q4H PRN    acetaminophen (TYLENOL) tablet 650 mg  650 mg Oral Q6H PRN    diphenhydrAMINE (BENADRYL) capsule 25 mg  25 mg Oral QHS PRN               Lab/Data Reviewed:  Procedures/imaging: see electronic medical records for all procedures/Xrays   and details which were not copied into this note but were reviewed prior to creation of Plan       All lab results for the last 24 hours reviewed.      Recent Labs     09/07/21  0232 09/06/21  0100 09/05/21  0342   WBC 7.2 6.0 8.0   HGB 10.9* 11.0* 11.8*   HCT 32.1* 33.0* 34.8*    184 237     Recent Labs     09/07/21  0232 09/06/21  0100 09/05/21  0342    136 128*   K 3.3* 4.0 4.6    101 92* CO2 27 28 27   * 138* 128*   BUN 29* 29* 33*   CREA 1.21 1.25 1.52*   CA 8.5 8.9 9.2       RADIOLOGY:  CT Results  (Last 48 hours)    None        CXR Results  (Last 48 hours)               09/06/21 0909  XR CHEST PA LAT Final result    Impression:      No active cardiopulmonary disease. Narrative:  EXAM: Chest Radiography       CLINICAL INDICATION/HISTORY: Cough, shortness of breath;      > Additional: None       COMPARISON: September 4       TECHNIQUE: PA and lateral       _______________       FINDINGS:       HEART AND MEDIASTINUM: Normal heart size with normal mediastinal contours. LUNGS AND PLEURAL SPACES: Hyperinflated but clear. No infiltrates or effusions.        BONY THORAX AND SOFT TISSUES: Unremarkable.       _______________                   Cardiology Procedures:   Results for orders placed or performed during the hospital encounter of 09/02/21   EKG, 12 LEAD, INITIAL   Result Value Ref Range    Ventricular Rate 117 BPM    Atrial Rate 117 BPM    P-R Interval 130 ms    QRS Duration 116 ms    Q-T Interval 346 ms    QTC Calculation (Bezet) 482 ms    Calculated P Axis 83 degrees    Calculated R Axis 78 degrees    Calculated T Axis 54 degrees    Diagnosis       Sinus tachycardia with occasional premature ventricular complexes  Possible Left atrial enlargement  Incomplete right bundle branch block  Cannot exclude Lateral infarct , age undetermined  Abnormal ECG  No previous ECGs available  Confirmed by Jeromy Courtney MD. ([id number]) on 9/2/2021 9:58:43 PM        Echo Results  (Last 48 hours)    None       Cardiolite (Tc-99m Sestamibi) stress test    Signed By: Martha Gallo MD     September 7, 2021

## 2021-09-07 NOTE — PROGRESS NOTES
2021-alert and oriented x 4. Lungs diminished with expiratory wheeze. BS active x 4 quads. purewick in place draining clear, yellow urine without difficulty. Patient is coughing often, robitussin given. C/O pain to right hip which is says is an old pain, rated at 5/10. She says this is within tolerable level. IV access to right hand infusing NS at 75 ml/hr without difficulty. On tele box 14 showing NSR.    1941-Tylenol and benadryl given. Patient says this is her night time routine that helps her sleep. 0536-Robitussin given for cough. Shift summary- No changes to health status. Denies need for pain medication through the noc. Continues to be SOB with exertion.

## 2021-09-07 NOTE — PROGRESS NOTES
0720 : assumed care of patient from [facility]    Patient had an uneventful shift, no new clinical changes. 1925 : Bedside shift change report given to Erica Vizcarra RN (oncoming nurse) by Rodger Perales RN (offgoing nurse). Report included the following information SBAR, Kardex, Intake/Output and MAR.

## 2021-09-07 NOTE — PROGRESS NOTES
Hospitalist Progress Note    Patient: Nick Ayala MRN: [id number]  CSN: [id number]    YOB: 1950  Age: 79 y.o. Sex: female    DOA: 9/2/2021 LOS:  LOS: 5 days                Assessment/Plan     Patient Active Problem List   Diagnosis Code    Hypertension I10    COPD exacerbation ([facility] J44.1    Hyponatremia E87.1    Cholelithiasis K80.20    [facility] ([facility] J44.1    Tobacco use Z72.0    Troponin level elevated R77.8    Sinus tachycardia R00.0        Chief complaint :  79 y.o. female with COPD, HTN presents to ER with concerns of shortness of breath. Breathing better, still with wheezing    COPD exacerbation -  Intravenous steroids. Pulmicort, brovana,   Duo nebs as needed. Empiric antibiotics. Mucolytics. Pulmonary consulted     Hyponatremia -  Resolved  Stopped HCTZ/talmisartan     HTN -  Presenting with tachycardia  Her amlodipine changed to cardizem       Tobacco use-  counseled     Elevated NT proBNP-  Echo with EF of 65 to 70%    Disposition : 1-2 days    Review of systems  General: No fevers or chills. Cardiovascular: No chest pain or pressure. No palpitations. Pulmonary:see above  Gastrointestinal: No nausea, vomiting. Physical Exam:  General: Awake, cooperative, no acute distress    HEENT: NC, Atraumatic. PERRLA, anicteric sclerae. Lungs: Expiratory wheezes bilaterally. Heart:  S1 S2,  No murmur, No Rubs, No Gallops  Abdomen: Soft, Non distended, Non tender.  +Bowel sounds,   Extremities: No c/c/e  Psych:   Not anxious or agitated. Neurologic:  No acute neurological deficit. Vital signs/Intake and Output:  Visit Vitals  /78   Pulse 65   Temp 98.5 °F (36.9 °C)   Resp 16   Ht 5' 7\" (1.702 m)   Wt 72.6 kg (160 lb)   SpO2 97%   BMI 25.06 kg/m²     Current Shift:  No intake/output data recorded.   Last three shifts:  09/06 0701 - 09/07 1900  In: 3802 [P.O.:480; I.V.:774]  Out: 3500 [Urine:3500]            Labs: Results: Chemistry Recent Labs     09/07/21  0232 09/06/21  0100 09/05/21  0342   * 138* 128*    136 128*   K 3.3* 4.0 4.6    101 92*   CO2 27 28 27   BUN 29* 29* 33*   CREA 1.21 1.25 1.52*   CA 8.5 8.9 9.2   AGAP 8 7 9   BUCR 24* 23* 22*      CBC w/Diff Recent Labs     09/07/21  0232 09/06/21  0100 09/05/21  0342   WBC 7.2 6.0 8.0   RBC 3.35* 3.40* 3.75*   HGB 10.9* 11.0* 11.8*   HCT 32.1* 33.0* 34.8*    184 237      Cardiac Enzymes Recent Labs     09/05/21  0745      CKND1 9.8*      Coagulation No results for input(s): PTP, INR, APTT, INREXT, INREXT in the last 72 hours. Lipid Panel No results found for: CHOL, CHOLPOCT, CHOLX, CHLST, CHOLV, 546336, HDL, HDLP, LDL, LDLC, DLDLP, 937312, VLDLC, VLDL, TGLX, TRIGL, TRIGP, TGLPOCT, CHHD, CHHDX   BNP No results for input(s): BNPP in the last 72 hours. Liver Enzymes No results for input(s): TP, ALB, TBIL, AP in the last 72 hours.     No lab exists for component: SGOT, GPT, DBIL   Thyroid Studies No results found for: T4, T3U, TSH, TSHEXT, TSHEXT     Procedures/imaging: see electronic medical records for all procedures/Xrays and details which were not copied into this note but were reviewed prior to creation of Plan

## 2021-09-07 NOTE — ROUTINE PROCESS
Bedside and Verbal shift change report given to WESLEY Ren RN (oncoming nurse) by Alia Dickey (offgoing nurse). Report included the following information SBAR, Kardex, Intake/Output and MAR.

## 2021-09-07 NOTE — ROUTINE PROCESS
Bedside and Verbal shift change report given to JOCELYN Yarbrough RN (oncoming nurse) by Polly Torres. Prudencio Carpenter RN (offgoing nurse). Report included the following information SBAR, Kardex, Intake/Output and MAR.

## 2021-09-07 NOTE — PROGRESS NOTES
Cardiology Progress Note        Patient: Oscar Haro        Sex: female          DOA: 9/2/2021  YOB: 1950      Age:  79 y.o.        LOS:  LOS: 4 days      Patient seen and examined, chart reviewed. Assessment/Plan     Patient Active Problem List   Diagnosis Code    Hypertension I10    [facility] J44.1    Hyponatremia E87.1    Cholelithiasis K80.20    COPD with acute exacerbation ([facility] J44.1    Tobacco use Z72.0    Troponin level elevated R77.8      Abnormal troponin: No evidence of ACS, could be due to demand ischemia   Cigarette smoker    Plan:    Continue Aspirin, Diltiazem and Metoprolol  Give IV Lasix 40 mg once a day  Discontinue IV Fluid  Monitor renal function and electrolytes   returns tomorrow                Subjective:    cc:   C/o shortness of breath and orthopnea       REVIEW OF SYSTEMS:     General: No fevers or chills. Cardiovascular:  Positive shortness of breath on exertion,No chest pain,No palpitations, Positive orthopnea, No PND, No leg swelling, No claudication  Pulmonary: No  dyspnea. Gastrointestinal: No nausea, vomiting, bleeding  Neurology: No Dizziness    Objective:      Visit Vitals  BP (!) 154/104   Pulse 79   Temp 97.9 °F (36.6 °C)   Resp 20   Ht 5' 7\" (1.702 m)   Wt 72.6 kg (160 lb)   SpO2 95%   BMI 25.06 kg/m²     Body mass index is 25.06 kg/m². Physical Exam:  General Appearance: Comfortable, not using accessory muscles of respiration. HEENT: GONZALO. HEAD: Atraumatic  NECK: No JVD, no thyroidomeglay. CAROTIDS: no bruit  LUNGS: Clear bilaterally. HEART: S1+S2 audible, no murmur, no pericardial rub.      ABD: Non-tender, BS Audible    NEUROLOGICAL: AAO times 3, No motor and sensory deficit    Medication:  Current Facility-Administered Medications   Medication Dose Route Frequency    [START ON 9/7/2021] furosemide (LASIX) injection 40 mg  40 mg IntraVENous DAILY    guaiFENesin-dextromethorphan (ROBITUSSIN DM) 100-10 mg/5 mL syrup 10 mL  10 mL Oral Q6H PRN    dilTIAZem ER (CARDIZEM CD) capsule 240 mg  240 mg Oral DAILY    methylPREDNISolone (PF) (Solu-MEDROL) injection 40 mg  40 mg IntraVENous Q8H    alum-mag hydroxide-simeth (MYLANTA) oral suspension 30 mL  30 mL Oral Q4H PRN    calcium carbonate (TUMS) chewable tablet 200 mg [elemental]  200 mg Oral TID PRN    albuterol-ipratropium (DUO-NEB) 2.5 MG-0.5 MG/3 ML  3 mL Nebulization QID RT    [Held by provider] telmisartan (MICARDIS) tablet 80 mg  80 mg Oral DAILY    azithromycin (ZITHROMAX) tablet 250 mg  250 mg Oral DAILY    aspirin chewable tablet 81 mg  81 mg Oral DAILY    cetirizine (ZYRTEC) tablet 10 mg  10 mg Oral DAILY    metoprolol tartrate (LOPRESSOR) tablet 50 mg  50 mg Oral BID    albuterol-ipratropium (DUO-NEB) 2.5 MG-0.5 MG/3 ML  3 mL Nebulization Q15MIN PRN    cefTRIAXone (ROCEPHIN) 1 g in sterile water (preservative free) 10 mL IV syringe  1 g IntraVENous Q24H    sodium chloride (NS) flush 5-40 mL  5-40 mL IntraVENous Q8H    sodium chloride (NS) flush 5-40 mL  5-40 mL IntraVENous PRN    0.9% sodium chloride infusion  75 mL/hr IntraVENous CONTINUOUS    budesonide (PULMICORT) 500 mcg/2 ml nebulizer suspension  500 mcg Nebulization BID RT    enoxaparin (LOVENOX) injection 40 mg  40 mg SubCUTAneous Q24H    arformoteroL (BROVANA) neb solution 15 mcg  15 mcg Nebulization BID RT    guaiFENesin ER (MUCINEX) tablet 600 mg  600 mg Oral Q12H    levalbuterol (XOPENEX) nebulizer soln 0.63 mg/3 mL  0.63 mg Nebulization Q4H PRN    acetaminophen (TYLENOL) tablet 650 mg  650 mg Oral Q6H PRN    diphenhydrAMINE (BENADRYL) capsule 25 mg  25 mg Oral QHS PRN               Lab/Data Reviewed:       Recent Labs     09/06/21  0100 09/05/21  0342 09/04/21  0337   WBC 6.0 8.0 9.6   HGB 11.0* 11.8* 10.8*   HCT 33.0* 34.8* 31.6*    237 219     Recent Labs     09/06/21  0100 09/05/21  0342 09/04/21  0337    128* 125*   K 4.0 4.6 4.8   CL 101 92* 91*   CO2 28 27 26   * 128* 129*   BUN 29* 33* 24*   CREA 1.25 1.52* 1.27   CA 8.9 9.2 8.5       Signed By: Rosaura Shay MD     September 6, 2021

## 2021-09-07 NOTE — PROGRESS NOTES
Problem: Mobility Impaired (Adult and Pediatric)  Goal: *Acute Goals and Plan of Care (Insert Text)  Description: Physical Therapy Goals   Initiated 9/4/2021 and to be accomplished within 5-7 day(s)  1. Patient will move from supine <> sit with S in prep for out of bed activity and change of position. 2.  Patient will perform sit<> stand with mod I/RW in prep for transfers/ambulation. 3.  Patient will transfer from bed <> chair with mod I/RW for time up in chair for completion of ADL activity. 4.  Patient will ambulate 80 feet with mod I/RW for improved functional mobility at discharge. 5.  Patient will ascend/descend 3-5 stairs with handrail(s) with minimal assist for home re-entry as needed as PTA. Outcome: Progressing Towards Goal   PHYSICAL THERAPY TREATMENT    Patient: Jo Baxter (82 y.o. female)  Date: 9/7/2021  Diagnosis: COPD with acute exacerbation ([facility] [J44.1]  Acute respiratory failure with hypoxia ([address] [J96.01] COPD with acute exacerbation ([address]    Precautions: Fall  PLOF: ambulatory with a RW, has a w/c    ASSESSMENT:  No assistance needed for supine<>sit or sit to stand. SpO2 96% with 1L and s/p ambulation on RA. Ambulated 120ft with RW, decreased stride, decreased pace, no LOB or path deviations. Returned to room and left sitting EOB per pt request.  Progression toward goals:   [x]      Improving appropriately and progressing toward goals  []      Improving slowly and progressing toward goals  []      Not making progress toward goals and plan of care will be adjusted     PLAN:  Patient continues to benefit from skilled intervention to address the above impairments. Continue treatment per established plan of care. Discharge Recommendations:  Home Health vs None  Further Equipment Recommendations for Discharge:  rolling walker     SUBJECTIVE:   Patient stated I am doing pretty good with my oxygen.     OBJECTIVE DATA SUMMARY:   Critical Behavior:  Neurologic State: Alert  Orientation Level: Oriented X4  Cognition: Appropriate decision making  Safety/Judgement: Awareness of environment, Fall prevention  Functional Mobility Training:  Bed Mobility:    Supine to Sit: Supervision  Transfers:  Sit to Stand: Supervision  Stand to Sit: Supervision  Balance:  Sitting: Intact  Standing: Intact; With support   Ambulation/Gait Training:  Distance (ft): 120 Feet (ft)  Assistive Device: Gait belt;Walker, rolling  Ambulation - Level of Assistance: Stand-by assistance        Pain:  Pain level pre-treatment: 0/10  Pain level post-treatment: 0/10   Pain Intervention(s): Medication (see MAR); Rest, Ice, Repositioning   Response to intervention: Nurse notified, See doc flow    Activity Tolerance:   Fair  Please refer to the flowsheet for vital signs taken during this treatment. After treatment:   [] Patient left in no apparent distress sitting up in chair  [x] Patient left in no apparent distress in bed  [x] Call bell left within reach  [] Nursing notified  [] Caregiver present  [] Bed alarm activated  [] SCDs applied      COMMUNICATION/EDUCATION:   [x]         Role of Physical Therapy in the acute care setting. [x]         Fall prevention education was provided and the patient/caregiver indicated understanding. [x]         Patient/family have participated as able in working toward goals and plan of care. [x]         Patient/family agree to work toward stated goals and plan of care. []         Patient understands intent and goals of therapy, but is neutral about his/her participation.   []         Patient is unable to participate in stated goals/plan of care: ongoing with therapy staff.  []         Other:        Pierre Díaz PTA   Time Calculation: 10 mins

## 2021-09-08 ENCOUNTER — HOME HEALTH ADMISSION (OUTPATIENT)
Dept: HOME HEALTH SERVICES | Facility: HOME HEALTH | Age: 71
End: 2021-09-08
Payer: MEDICARE

## 2021-09-08 VITALS
HEART RATE: 67 BPM | WEIGHT: 160 LBS | RESPIRATION RATE: 18 BRPM | TEMPERATURE: 97.9 F | BODY MASS INDEX: 25.11 KG/M2 | HEIGHT: 67 IN | SYSTOLIC BLOOD PRESSURE: 153 MMHG | DIASTOLIC BLOOD PRESSURE: 60 MMHG | OXYGEN SATURATION: 96 %

## 2021-09-08 LAB
BACTERIA SPEC CULT: NORMAL
BACTERIA SPEC CULT: NORMAL
IGE SERPL-ACNC: 302 IU/ML (ref 6–495)
SERVICE CMNT-IMP: NORMAL
SERVICE CMNT-IMP: NORMAL

## 2021-09-08 PROCEDURE — 74011250637 HC RX REV CODE- 250/637: Performed by: HOSPITALIST

## 2021-09-08 PROCEDURE — 74011000250 HC RX REV CODE- 250: Performed by: INTERNAL MEDICINE

## 2021-09-08 PROCEDURE — 74011000250 HC RX REV CODE- 250: Performed by: HOSPITALIST

## 2021-09-08 PROCEDURE — 74011250637 HC RX REV CODE- 250/637: Performed by: INTERNAL MEDICINE

## 2021-09-08 PROCEDURE — 74011250637 HC RX REV CODE- 250/637: Performed by: FAMILY MEDICINE

## 2021-09-08 PROCEDURE — 74011250636 HC RX REV CODE- 250/636: Performed by: INTERNAL MEDICINE

## 2021-09-08 PROCEDURE — 94640 AIRWAY INHALATION TREATMENT: CPT

## 2021-09-08 PROCEDURE — 94760 N-INVAS EAR/PLS OXIMETRY 1: CPT

## 2021-09-08 PROCEDURE — 97116 GAIT TRAINING THERAPY: CPT

## 2021-09-08 PROCEDURE — 77010033678 HC OXYGEN DAILY

## 2021-09-08 RX ORDER — PREDNISONE 10 MG/1
TABLET ORAL
Qty: 21 TABLET | Refills: 0 | Status: SHIPPED | OUTPATIENT
Start: 2021-09-08 | End: 2022-04-24

## 2021-09-08 RX ORDER — DILTIAZEM HYDROCHLORIDE 240 MG/1
240 CAPSULE, COATED, EXTENDED RELEASE ORAL DAILY
Qty: 30 CAPSULE | Refills: 2 | Status: SHIPPED | OUTPATIENT
Start: 2021-09-09

## 2021-09-08 RX ORDER — IPRATROPIUM BROMIDE AND ALBUTEROL SULFATE 2.5; .5 MG/3ML; MG/3ML
3 SOLUTION RESPIRATORY (INHALATION)
Qty: 30 NEBULE | Refills: 2 | Status: ON HOLD | OUTPATIENT
Start: 2021-09-08 | End: 2022-04-24 | Stop reason: SDUPTHER

## 2021-09-08 RX ORDER — TELMISARTAN 80 MG/1
80 TABLET ORAL DAILY
Qty: 30 TABLET | Refills: 2 | Status: SHIPPED | OUTPATIENT
Start: 2021-09-08

## 2021-09-08 RX ORDER — GUAIFENESIN 600 MG/1
600 TABLET, EXTENDED RELEASE ORAL EVERY 12 HOURS
Qty: 60 TABLET | Refills: 0 | Status: SHIPPED | OUTPATIENT
Start: 2021-09-08

## 2021-09-08 RX ORDER — ARFORMOTEROL TARTRATE 15 UG/2ML
15 SOLUTION RESPIRATORY (INHALATION) 2 TIMES DAILY
Qty: 60 EACH | Refills: 2 | Status: SHIPPED | OUTPATIENT
Start: 2021-09-08

## 2021-09-08 RX ORDER — BUDESONIDE 0.5 MG/2ML
500 INHALANT ORAL 2 TIMES DAILY
Qty: 60 EACH | Refills: 2 | Status: SHIPPED | OUTPATIENT
Start: 2021-09-08

## 2021-09-08 RX ORDER — GUAIFENESIN 100 MG/5ML
81 LIQUID (ML) ORAL DAILY
Qty: 30 TABLET | Refills: 2 | Status: SHIPPED | OUTPATIENT
Start: 2021-09-09

## 2021-09-08 RX ADMIN — DILTIAZEM HYDROCHLORIDE 240 MG: 240 CAPSULE, COATED, EXTENDED RELEASE ORAL at 12:27

## 2021-09-08 RX ADMIN — ARFORMOTEROL TARTRATE 15 MCG: 15 SOLUTION RESPIRATORY (INHALATION) at 07:27

## 2021-09-08 RX ADMIN — AZITHROMYCIN MONOHYDRATE 250 MG: 250 TABLET ORAL at 12:27

## 2021-09-08 RX ADMIN — IPRATROPIUM BROMIDE AND ALBUTEROL SULFATE 3 ML: .5; 3 SOLUTION RESPIRATORY (INHALATION) at 12:30

## 2021-09-08 RX ADMIN — IPRATROPIUM BROMIDE AND ALBUTEROL SULFATE 3 ML: .5; 3 SOLUTION RESPIRATORY (INHALATION) at 15:43

## 2021-09-08 RX ADMIN — CETIRIZINE HYDROCHLORIDE 10 MG: 10 TABLET, FILM COATED ORAL at 12:27

## 2021-09-08 RX ADMIN — GUAIFENESIN 600 MG: 600 TABLET, EXTENDED RELEASE ORAL at 12:27

## 2021-09-08 RX ADMIN — BUDESONIDE 500 MCG: 0.5 INHALANT RESPIRATORY (INHALATION) at 07:27

## 2021-09-08 RX ADMIN — METOPROLOL TARTRATE 50 MG: 50 TABLET, FILM COATED ORAL at 12:27

## 2021-09-08 RX ADMIN — ASPIRIN 81 MG: 81 TABLET, CHEWABLE ORAL at 12:27

## 2021-09-08 RX ADMIN — FUROSEMIDE 40 MG: 10 INJECTION, SOLUTION INTRAMUSCULAR; INTRAVENOUS at 12:27

## 2021-09-08 RX ADMIN — GUAIFENESIN AND DEXTROMETHORPHAN 10 ML: 100; 10 SYRUP ORAL at 00:16

## 2021-09-08 RX ADMIN — Medication 10 ML: at 07:10

## 2021-09-08 RX ADMIN — METHYLPREDNISOLONE SODIUM SUCCINATE 40 MG: 125 INJECTION, POWDER, FOR SOLUTION INTRAMUSCULAR; INTRAVENOUS at 07:09

## 2021-09-08 RX ADMIN — IPRATROPIUM BROMIDE AND ALBUTEROL SULFATE 3 ML: .5; 3 SOLUTION RESPIRATORY (INHALATION) at 07:27

## 2021-09-08 NOTE — PROGRESS NOTES
Bedside and Verbal shift change report given to Candice Concepcion RN (oncoming nurse) by  Karalee Dandy, RN (offgoing nurse). Report included the following information SBAR, Kardex, ED Summary, Intake/Output, MAR, Recent Results, Med Rec Status and Cardiac Rhythm NSR.     2027  Shift assessment complete  Pt resting quietly with eyes closed and chest rising and falling evenly  No c/o pain or signs of distress  Bed locked and in lowest position   Call light within [address] Cardiac/Medical Night Shift Chart Audit    Chart Audit completed? YES        Bedside and Written shift change report given to Colette Bryant RN (oncoming nurse) by Candice Concepcion RN (offgoing nurse). Report included the following information SBAR, Kardex, ED Summary, Intake/Output, MAR, Recent Results, Med Rec Status and Cardiac Rhythm NSR.

## 2021-09-08 NOTE — PROGRESS NOTES
Problem: Breathing Pattern - Ineffective  Goal: *Absence of hypoxia  Outcome: Progressing Towards Goal  Goal: *Use of effective breathing techniques  Outcome: Progressing Towards Goal     Problem: Patient Education: Go to Patient Education Activity  Goal: Patient/Family Education  Outcome: Progressing Towards Goal     Problem: Falls - Risk of  Goal: *Absence of Falls  Description: Document [place] Fall Risk and appropriate interventions in the flowsheet.   Outcome: Progressing Towards Goal  Note: Fall Risk Interventions:  Mobility Interventions: Bed/chair exit alarm, Assess mobility with egress test, Communicate number of staff needed for ambulation/transfer, Patient to call before getting OOB    Mentation Interventions: Adequate sleep, hydration, pain control, Update white board    Medication Interventions: Teach patient to arise slowly, Patient to call before getting OOB    Elimination Interventions: Call light in reach, Patient to call for help with toileting needs              Problem: Patient Education: Go to Patient Education Activity  Goal: Patient/Family Education  Outcome: Progressing Towards Goal     Problem: Gas Exchange - Impaired  Goal: *Absence of hypoxia  Outcome: Progressing Towards Goal     Problem: Patient Education: Go to Patient Education Activity  Goal: Patient/Family Education  Outcome: Progressing Towards Goal     Problem: Patient Education: Go to Patient Education Activity  Goal: Patient/Family Education  Outcome: Progressing Towards Goal

## 2021-09-08 NOTE — PROGRESS NOTES
Problem: Mobility Impaired (Adult and Pediatric)  Goal: *Acute Goals and Plan of Care (Insert Text)  Description: Physical Therapy Goals   Initiated 9/4/2021 and to be accomplished within 5-7 day(s)  1. Patient will move from supine <> sit with S in prep for out of bed activity and change of position. 2.  Patient will perform sit<> stand with mod I/RW in prep for transfers/ambulation. 3.  Patient will transfer from bed <> chair with mod I/RW for time up in chair for completion of ADL activity. 4.  Patient will ambulate 80 feet with mod I/RW for improved functional mobility at discharge. 5.  Patient will ascend/descend 3-5 stairs with handrail(s) with minimal assist for home re-entry as needed as PTA. Note:     PHYSICAL THERAPY TREATMENT    Patient: Jo Baxter (89 y.o. female)  Date: 9/8/2021  Diagnosis: COPD with acute exacerbation ([address] [J44.1]  Acute respiratory failure with hypoxia ([address] [J96.01] COPD with acute exacerbation ([address]       Precautions: Fall  PLOF: Pt was independent with household and community mobility with no AD PTA. ASSESSMENT:  Pt with continued improved activity tolerance, on RA before and t/o session. Pt sitting EOB on PT arrival, agreeable to amb in marquis. Pt able to perform STS transfers with S and RW. Pt amb in marquis with RW and SBA/S with no LOB or path deviation, one short (<10 second) standing rest break required d/t shortness of breath. Pt maintained spO2 >95% t/o session on RA. Pt left sitting EOB with all needs met and all questions answered. Progression toward goals:   [x]      Improving appropriately and progressing toward goals  []      Improving slowly and progressing toward goals  []      Not making progress toward goals and plan of care will be adjusted     PLAN:  Patient continues to benefit from skilled intervention to address the above impairments. Continue treatment per established plan of care.   Discharge Recommendations:  HHPT v none  Further Equipment Recommendations for Discharge:  N/A     SUBJECTIVE:   Patient stated i'm going home today. I want this walk to be perfect.     OBJECTIVE DATA SUMMARY:   Critical Behavior:  Neurologic State: Alert, Appropriate for age, Eyes open spontaneously  Orientation Level: Appropriate for age, Oriented X4  Cognition: Appropriate decision making, Appropriate for age attention/concentration, Appropriate safety awareness, Follows commands  Safety/Judgement: Awareness of environment, Fall prevention  Functional Mobility Training:  Transfers:  Sit to Stand: Supervision  Stand to Sit: Supervision  Balance:  Sitting: Intact  Standing: Intact; With support  Ambulation/Gait Training:  Distance (ft): 120 Feet (ft)  Assistive Device: Gait belt;Walker, rolling  Ambulation - Level of Assistance: Stand-by assistance;Supervision  Speed/Elvia: Slow  Pain:  Pain level pre-treatment: 0/10  Pain level post-treatment: 0/10   Pain Intervention(s): Medication (see MAR); Rest, Ice, Repositioning   Response to intervention: Nurse notified, See doc flow  Activity Tolerance:   Pt tolerated amb in marquis on RA with spO2 >95% t/o session. Please refer to the flowsheet for vital signs taken during this treatment. After treatment:   [] Patient left in no apparent distress sitting up in chair  [x] Patient left in no apparent distress in bed  [x] Call bell left within reach  [x] Nursing notified  [] Caregiver present  [] Bed alarm activated  [] SCDs applied      COMMUNICATION/EDUCATION:   [x]         Role of Physical Therapy in the acute care setting. [x]         Fall prevention education was provided and the patient/caregiver indicated understanding. [x]         Patient/family have participated as able in working toward goals and plan of care. [x]         Patient/family agree to work toward stated goals and plan of care. []         Patient understands intent and goals of therapy, but is neutral about his/her participation.   [] Patient is unable to participate in stated goals/plan of care: ongoing with therapy staff.  []         Other:         María Sarabia   Time Calculation: 12 mins

## 2021-09-08 NOTE — PROGRESS NOTES
0730: report given to this nurse from [address] [place] [address] RN    [phone]: pt up with PT  Mag Chisholm RN    22 [id number]: d/c order noted for pt per MD Gabriela Reyes RN    0385: d/c instructions printed for review with pt  Mag Chisholm RN    021 2441: d/c instructions reviewed with pt, all questions answered and no c/o health concern at time of d/c  VARUN Howell RN

## 2021-09-08 NOTE — PROGRESS NOTES
Care Management    Discharge Planning: Home with home health, family assist and MD follow-up    Care Management/Patient Conversation: CM spoke with the patient at the bedside regarding plans for discharge. The patient states that her son will be available to pick her up upon discharge. CM reminded the patient that [facility] will reach out to her within 24-48 hours of discharge to arrange the initial visit. The patient denies any additional questions or concerns at this time. CM to follow the patient's progress and be available to assist with discharge planning as needed. CMS referral placed. Care Management Interventions  PCP Verified by CM: Yes  Palliative Care Criteria Met (RRAT>21 & CHF Dx)?: No  Mode of Transport at Discharge:  Other (see comment) (Family)  Transition of Care Consult (CM Consult): Discharge Planning  Health Maintenance Reviewed: Yes  Support Systems: Child(santa)  Read Only, Retired: Current Support Network: Relative's Home  Confirm Follow Up Transport: Family  The Plan for Transition of Care is Related to the Following Treatment Goals : [facility] and physician follow up   The Patient and/or Patient Representative was Provided with a Choice of Provider and Agrees with the Discharge Plan?: Yes  Name of the Patient Representative Who was Provided with a Choice of Provider and Agrees with the Discharge Plan: pt  Freedom of Choice List was Provided with Basic Dialogue that Supports the Patient's Individualized Plan of Care/Goals, Treatment Preferences and Shares the Quality Data Associated with the Providers?: Yes  Discharge Location  Discharge Placement: Home with home health [facility])

## 2021-09-08 NOTE — DISCHARGE INSTRUCTIONS
Patient Education         DISCHARGE SUMMARY from Nurse    PATIENT INSTRUCTIONS:    After 24 hours or while taking prescription Narcotics:  · Limit your activities  · Do not drive and operate hazardous machinery  · Do not make important personal or business decisions  · Do  not drink alcoholic beverages  · If you have not urinated within 8 hours after discharge, please contact your doctor on call. Report the following to your doctor:  · Temperature over 101.5  · Nausea and vomiting lasting longer than 6 hours or if unable to take medications  · Any signs of decreased circulation or nerve impairment to extremity: change in color, persistent  numbness, tingling, coldness or increase pain  · Any questions    What to do at Home:  Recommended activity: no driving today activity as tolerated    If you experience any of the following symptoms, please follow up with your doctor. *  Please give a list of your current medications to your Primary Care Provider. *  Please update this list whenever your medications are discontinued, doses are      changed, or new medications (including over-the-counter products) are added. *  Please carry medication information at all times in case of emergency situations. These are general instructions for a healthy lifestyle:    No smoking/ No tobacco products/ Avoid exposure to second hand smoke  Surgeon General's Warning:  Quitting smoking now greatly reduces serious risk to your health. Obesity, smoking, and sedentary lifestyle greatly increases your risk for illness    A healthy diet, regular physical exercise & weight monitoring are important for maintaining a healthy lifestyle    You may be retaining fluid if you have a history of heart failure or if you experience any of the following symptoms:  Weight gain of 3 pounds or more overnight or 5 pounds in a week, increased swelling in our hands or feet or shortness of breath while lying flat in bed.   Please call your doctor as soon as you notice any of these symptoms; do not wait until your next office visit. The discharge information has been reviewed with the {PATIENT PARENT GUARDIAN:61354}. The {PATIENT PARENT GUARDIAN:32535} verbalized understanding. Discharge medications reviewed with the {Dishcarge meds reviewed [facility]:81591} and appropriate educational materials and side effects teaching were provided. ___________________________________________________________________________________________________________________________________     COPD and Asthma: Care Instructions  Your Care Instructions     Some people who have chronic obstructive pulmonary disease (COPD) also have asthma. Both of these problems can damage your lungs. This makes it very important to control them. Asthma causes the airways that lead to the lungs to swell and become narrow. This makes it hard to breathe. You may wheeze or cough. If you have a bad attack, you may need emergency care. There are two parts to treating asthma. · Controlling asthma over the long term. · Treating attacks when they occur. You and your doctor can make an asthma treatment plan that will help. This plan tells you the medicines you take every day to reduce the swelling in your airways and prevent attacks. It also tells you what to do if you have an asthma attack. Follow-up care is a key part of your treatment and safety. Be sure to make and go to all appointments, and call your doctor if you are having problems. It's also a good idea to know your test results and keep a list of the medicines you take. How can you care for yourself at home? To control asthma over the long term  Medicines   Controller medicines reduce swelling in your lungs. They also prevent asthma attacks. Take your controller medicine exactly as prescribed. Talk to your doctor if you have any problems with your medicine. · Inhaled corticosteroid is a common and effective controller medicine.  Using it the right way can prevent or reduce most side effects. · Take your controller medicine every day, not just when you have symptoms. This helps prevent problems before they occur. · Always bring your asthma medicine with you when you travel. · Your doctor may prescribe long-acting medicine that combines a corticosteroid with a beta2-agonist. Follow your doctor's instructions exactly about how to take a long-acting medicine. Examples include:  ? Fluticasone and salmeterol (Advair). ? Budesonide and formoterol (Symbicort). · Do not depend on your controller medicines to stop an asthma attack that has already started. They do not work fast enough to help. · Your doctor may also prescribe anticholinergic inhalers. These include ipratropium (Atrovent) and tiotropium (Spiriva). Education   · Learn what sets off an asthma attack. Avoid these triggers when you can. Common triggers include smoke, air pollution, pollen, animal dander, colds, stress, and cold air. · Do not smoke. Smoking can make COPD and asthma worse. If you need help quitting, talk to your doctor about stop-smoking programs and medicines. These can increase your chances of quitting for good. · Check yourself for symptoms to know which step to follow in your action plan. Watch for things like being short of breath, having chest tightness, coughing, and wheezing. Also notice if symptoms wake you up at night or if you get tired quickly when you exercise. · You may want to learn how to use a peak flow meter. This measures how open your airways are. It may help you know when you will have an asthma attack. To treat attacks when they occur  Use your asthma action plan when you have an attack. Your quick-relief medicine, such as albuterol, will stop an asthma attack. It relaxes the muscles that get tight around the airways. · Take your quick-relief medicine exactly as prescribed. Talk with your doctor if you have any problems with your medicine.   · Keep this medicine with you at all times. · You may need to use this medicine before you exercise. If your doctor prescribed corticosteroid pills to use during an attack, take them as directed. They may take hours to work, but they may shorten the attack and help you breathe better. When should you call for help? Call 911 anytime you think you may need emergency care. For example, call if:    · You have severe trouble breathing. Call your doctor now or seek immediate medical care if:    · You have new or worse shortness of breath.     · You are coughing more deeply or more often, especially if you notice more mucus or a change in the color of your mucus.     · You cough up blood.     · You have new or increased swelling in your legs or belly.     · You have a fever.     · You have used your quick-relief medicine but you are still short of breath. Watch closely for changes in your health, and be sure to contact your doctor if you have any problems. Where can you learn more? Go to http://marie-jeff.info/  Enter A350 in the search box to learn more about \"COPD and Asthma: Care Instructions. \"  Current as of: October 26, 2020               Content Version: 12.8  © 3929-5019 [employer]. Care instructions adapted under license by [employer] (which disclaims liability or warranty for this information). If you have questions about a medical condition or this instruction, always ask your healthcare professional. [place] [address] any warranty or liability for your use of this information. Patient Education   Budesonide/Formoterol (By breathing)   Budesonide (cqe-NBT-df-nide), Formoterol Fumarate (for-TAZ-ter-ol FUE-ma-rate)  Treats asthma and chronic obstructive pulmonary disease (COPD). This medicine contains a corticosteroid. Brand Name(s): Symbicort 160/4.5, Symbicort 80/4.5   There may be other brand names for this medicine.   When This Medicine Should Not Be Used: You should not use this medicine if you have had an allergic reaction to budesonide or formoterol. You should not use this medicine if your asthma attack has already started, or if you are having a severe asthma attack or COPD flare-up. How to Use This Medicine:   Liquid Under Pressure, Powder  · Your doctor will tell you how much medicine to use. Do not use more than directed. · This medicine should come with a Medication Guide. Ask your pharmacist for a copy if you do not have one. · Test spray in the air before using for the first time or if the inhaler has not been used for a while. · Shake well for 5 seconds before using. You must keep track of the number of puffs you use. Use the dose tracker card to do this. Throw away the inhaler after you have used the number of puffs allowed, or if it is 3 months since you opened the foil pouch. · When you have finished all your inhalations, rinse your mouth out with water. Do not swallow the water after rinsing. If a dose is missed:   · Take a dose as soon as you remember. If it is almost time for your next dose, wait until then and take a regular dose. Do not take extra medicine to make up for a missed dose. How to Store and Dispose of This Medicine:   · Store the canister at room temperature, away from heat and direct light. Do not freeze. Do not keep this medicine inside a car where it could be exposed to extreme heat or cold. Do not poke holes in the canister or throw it into a fire, even if the canister is empty. Store the inhaler with the mouthpiece down. · Ask your pharmacist, doctor, or health caregiver about the best way to dispose of the used medicine container and any leftover medicine. You will also need to throw away old medicine after the expiration date has passed. · Keep all medicine out of the reach of children. Never share your medicine with anyone.   Drugs and Foods to Avoid:   Ask your doctor or pharmacist before using any other medicine, including over-the-counter medicines, vitamins, and herbal products. · Make sure your doctor knows if you are also using clarithromycin (Biaxin®), erythromycin (PCE®), itraconazole (Sporanox®), ketoconazole (Melanie Pilon), telithromycin Sheran León), medicine to treat HIV infection (such as atazanavir, indinavir, nelfinavir, ritonavir, saquinavir, Crixivan®, Fotovase®, Invirase®, Norvir®, or Reyataz®), blood pressure medicines (such as atenolol, labetalol, Inderal®, Tenormin®, or Toprol®), or diuretics or \"water pills\" (such as furosemide, hydrochlorothiazide [HCTZ], or Lasix®). Tell your doctor if you are taking a medicine for depression or took a depression medicine in the past 2 weeks such as amitriptyline, doxepin, nefazodone, Elavil®, Eldepryl®, Marplan®, Nardil®, Pamelor®, Parnate®, or Sinequan®. Warnings While Using This Medicine:   · Make sure your doctor knows if you are pregnant or breastfeeding, or if you have liver disease, bone problems (such as osteoporosis), heart or blood vessel disease, heart rhythm problems, high blood pressure, seizures, thyroid problems, diabetes, any kind of infection (especially tuberculosis or herpes infection of the eye), low potassium in the blood, or if you have a weakened immune system. Tell your doctor if you have eye problems (such as a cataract or glaucoma). · Although this medicine decreases the number of asthma episodes, it may increase the chances of a severe asthma episode when they do occur. Talk to your doctor about any questions or concerns that you have. · This should not be the first and only medicine you use for asthma or COPD. This medicine will not stop an asthma attack that has already started. Your doctor may prescribe another medicine for you to use in case of an acute asthma attack or an acute COPD flare-up. If the other medicine does not work as well, tell your doctor right away.   · Take all of your COPD medicines as your doctor ordered. If you use any type of corticosteroid medicine to control your breathing, keep using it as ordered by your doctor. Do not change your doses or stop using your medicines without asking your doctor. · Do not use any other asthma medicine or medicine for breathing problems without talking to your doctor. This medicine should not be used with arformoterol, formoterol, salmeterol, Brovana®, Perforomist, or Serevent® inhalers. · If any of your asthma medicines do not seem to be working as well as usual, call your doctor right away. Do not change your doses or stop using your medicines without asking your doctor. · Call your doctor if your symptoms do not improve or if they get worse. · You may get infections more easily while using this medicine. Avoid people who are sick or have infections. Tell your doctor right away if you have been exposed to someone with chickenpox or measles. · This medicine may cause a fungus infection of the mouth or throat (thrush). Tell your doctor right away if you have white patches in the mouth or throat; or pain when eating or swallowing. · Patients with COPD may be more likely to have pneumonia when taking this medicine. Check with your doctor if you start having an increased sputum (spit) production, change in sputum color, fever, chills, increased cough, or an increase in breathing problems. · Using too much of this medicine or using it for a long time may increase your risk of having adrenal gland problems. Talk to your doctor if you have more than one of these symptoms while you are using this medicine: darkening of the skin; diarrhea; dizziness; fainting; loss of appetite; mental depression; nausea; skin rash; unusual tiredness or weakness; or vomiting. · This medicine may cause paradoxical bronchospasm, which means your breathing or wheezing will get worse. Paradoxical bronchospasm may be life-threatening.  Stop using this medicine and check with your doctor right away if you have coughing, difficulty breathing, shortness of breath, or wheezing after using this medicine. · If you or your child develop a skin rash, hives, or any allergic reaction to this medicine, stop using the medicine and check with your doctor as soon as possible. · This medicine may decrease bone mineral density when used for a long time. A low bone mineral density can cause weak bones or osteoporosis. If you have any questions about this, ask your doctor. · This medicine may cause children to grow more slowly than usual. Talk to your child's doctor if you have any concerns. · Check with your doctor right away if you or your child have blurred vision, difficulty with reading, or any other change in vision while using this medicine. Your doctor may want you to have your eyes checked by an ophthalmologist (eye doctor). Be sure to keep all appointments. · This medicine may affect blood sugar and potassium levels. If you have heart disease or are diabetic and notice a change in the results of your blood or urine sugar or potassium tests, check with your doctor. · Your doctor may want you to carry a medical identification (ID) card stating that you are using this medicine. The card will say that you or your child may need additional medicine during an emergency, a severe asthma attack or other illness, or unusual stress. · Your doctor will check your progress and the effects of this medicine at regular visits. Keep all appointments. Possible Side Effects While Using This Medicine:   Call your doctor right away if you notice any of these side effects:  · Allergic reaction: Itching or hives, swelling in your face or hands, swelling or tingling in your mouth or throat, chest tightness, trouble breathing  · Changes in vision. · Chest pain, shortness of breath, troubled breathing, tightness in the chest, or wheezing. · Dry mouth, increased thirst, or muscle cramps.   · Fast, pounding, or uneven heartbeat. · Fever, chills, cough, runny or stuffy nose, sore throat, and body aches. · Lightheadedness, dizziness, or fainting. · Seizures or tremors. · Sores or white patches on your lips, mouth, or throat. If you notice these less serious side effects, talk with your doctor:   · Anxiety, nervousness, or restlessness. · Headache. · Nausea, vomiting, diarrhea, upset stomach, or stomach pain. · Skin rash. If you notice other side effects that you think are caused by this medicine, tell your doctor. Call your doctor for medical advice about side effects. You may report side effects to FDA at 1-010-FDA-6088  © 2017 [facility] Information is for End User's use only and may not be sold, redistributed or otherwise used for commercial purposes. The above information is an  only. It is not intended as medical advice for individual conditions or treatments. Talk to your doctor, nurse or pharmacist before following any medical regimen to see if it is safe and effective for you.

## 2021-09-08 NOTE — DISCHARGE SUMMARY
Discharge Summary    Patient: Leia Porras MRN: [id number]  CSN: [id number]    YOB: 1950  Age: 79 y.o. Sex: female    DOA: 9/2/2021 LOS:  LOS: 6 days   Discharge Date: 9/8/2021     Primary Care Provider:  Alexia Carcamo MD    Admission Diagnoses: COPD with acute exacerbation [facility]) [J44.1]  Acute respiratory failure with hypoxia ([facility] [J96.01]    Discharge Diagnoses:    Problem List as of 9/8/2021 Never Reviewed        Codes Class Noted - Resolved    Sinus tachycardia ICD-10-CM: R00.0  ICD-9-CM: 427.89  9/7/2021 - Present        Troponin level elevated ICD-10-CM: R77.8  ICD-9-CM: 790.6  9/4/2021 - Present        Hypertension ICD-10-CM: I10  ICD-9-CM: 401.9  Unknown - Present        COPD exacerbation ([facility] ICD-10-CM: J44.1  ICD-9-CM: 491.21  Unknown - Present        Hyponatremia ICD-10-CM: E87.1  ICD-9-CM: 276.1  9/2/2021 - Present        Cholelithiasis ICD-10-CM: K80.20  ICD-9-CM: 574.20  9/2/2021 - Present        * (Principal) COPD with acute exacerbation ([facility] ICD-10-CM: J44.1  ICD-9-CM: 491.21  9/2/2021 - Present        Tobacco use ICD-10-CM: Z72.0  ICD-9-CM: 305.1  9/2/2021 - Present              Discharge Medications:     Discharge Medication List as of 9/8/2021  3:58 PM      START taking these medications    Details   arformoteroL (BROVANA) 15 mcg/2 mL nebu neb solution 2 mL by Nebulization route two (2) times a day., Normal, Disp-60 Each, R-2      aspirin 81 mg chewable tablet Take 1 Tablet by mouth daily. , Normal, Disp-30 Tablet, R-2      budesonide (PULMICORT) 0.5 mg/2 mL nbsp 2 mL by Nebulization route two (2) times a day., Normal, Disp-60 Each, R-2      dilTIAZem ER (CARDIZEM CD) 240 mg capsule Take 1 Capsule by mouth daily. , Normal, Disp-30 Capsule, R-2      guaiFENesin ER (MUCINEX) 600 mg ER tablet Take 1 Tablet by mouth every twelve (12) hours. , Normal, Disp-60 Tablet, R-0      telmisartan (MICARDIS) 80 mg tablet Take 1 Tablet by mouth daily. , Normal, Disp-30 Tablet, R-2 predniSONE (STERAPRED DS) 10 mg dose pack See administration instruction per 10mg dose pack, Normal, Disp-21 Tablet, R-0         CONTINUE these medications which have CHANGED    Details   albuterol-ipratropium (DUO-NEB) 2.5 mg-0.5 mg/3 ml nebu 3 mL by Nebulization route every six (6) hours as needed for Wheezing., Normal, Disp-30 Nebule, R-2         CONTINUE these medications which have NOT CHANGED    Details   diphenhydrAMINE (Benadryl Allergy) 25 mg tablet Take 25 mg by mouth every six (6) hours as needed for Allergies. , Historical Med      cetirizine (ZyrTEC) 10 mg tablet Take 10 mg by mouth daily. , Historical Med      metoprolol tartrate (LOPRESSOR) 50 mg tablet Take 50 mg by mouth two (2) times a day., Historical Med      multivitamin (ONE A DAY) tablet Take 1 Tablet by mouth daily. , Historical Med         STOP taking these medications       telmisartan-hydroCHLOROthiazide (MICARDIS HCT) 80-25 mg per tablet Comments:   Reason for Stopping:         amLODIPine (NORVASC) 5 mg tablet Comments:   Reason for Stopping:               Discharge Condition: Good    Procedures : None    Consults: Cardiology and Pulmonary/Critical Care      PHYSICAL EXAM   Visit Vitals  BP (!) 153/60   Pulse 67   Temp 97.9 °F (36.6 °C)   Resp 18   Ht 5' 7\" (1.702 m)   Wt 72.6 kg (160 lb)   SpO2 96%   BMI 25.06 kg/m²     General: Awake, cooperative, no acute distress    HEENT: NC, Atraumatic. PERRLA, EOMI. Anicteric sclerae. Lungs:  CTA Bilaterally. No Wheezing/Rhonchi/Rales. Heart:  Regular  rhythm,  No murmur, No Rubs, No Gallops  Abdomen: Soft, Non distended, Non tender. +Bowel sounds,   Extremities: No c/c/e  Psych:   Not anxious or agitated. Neurologic:  No acute neurological deficits. Admission HPI :   Silver Tracy is a 79 y.o. female with COPD, HTN presents to ER with concerns of shortness of breath. Patient reported that she started having shortness of breath for the last  2 days.   Since yesterday she has been getting progressively more short of breath. This morning she was significantly short of breath and called EMS. She received Solu-Medrol and neb treatment by EMS. Other symptoms include cough. She denies any fever, chills, chest pain, headache, nausea, vomiting. She denies any sick contacts. She has received Covid 19 vaccination. In ER she is noted to be wheezing, her sodium at 124, NT proBNP at 1158. Her CT chest showed diffuse bronchial wall thickening and scattered foci of endobronchial mucoid impaction with several faint central lobar distribution nodules infectious versus inflammatory in nature. Small other additional pulmonary nodules. Cholelithiasis. VQ scan negative for PE. Hospital Course :   Ms. Lianet Petit was admitted to monitored floor, she was seen and followed by cardiology and pulmonary. She did not had any acute events during hospitalization. COPD exacerbation-  She was started on IV steroids, Pulmicort and Brovana. She was also started on DuoNebs as needed. Empiric antibiotics used along with mucolytic's. She was seen by pulmonary who agreed with continuing the above treatment. She had improvement in her symptoms. Pulmonary recommended discharging on Pulmicort and Brovana. We will also discharge on prednisone taper dose. She will follow-up with pulmonary. She was tested for home oxygen and she is saturating above 93% on room air while ambulating. Hyponatremia-  Resolved, her HCTZ stopped. Hypertension-  Blood pressure now better controlled. Her amlodipine switched to Cardizem. Is also started on beta-blocker. Her HCTZ stopped and we continued her telmisartan. Sinus tachycardia-  Resolved, seen by cardiology. Echocardiogram showed EF of 65 to 70%. Cardiology recommended continuing with Cardizem and beta-blocker. Tobacco use-  Strongly advised to stop smoking.     Activity: Activity as tolerated    Diet: Regular Diet    Follow-up: PCP, pulmonary    Disposition: home with home health. Minutes spent on discharge: 45       Labs: Results:       Chemistry Recent Labs     09/07/21  0232 09/06/21  0100   * 138*    136   K 3.3* 4.0    101   CO2 27 28   BUN 29* 29*   CREA 1.21 1.25   CA 8.5 8.9   AGAP 8 7   BUCR 24* 23*      CBC w/Diff Recent Labs     09/07/21  0232 09/06/21  0100   WBC 7.2 6.0   RBC 3.35* 3.40*   HGB 10.9* 11.0*   HCT 32.1* 33.0*    184      Cardiac Enzymes No results for input(s): CPK, CKND1, ABDIEL in the last 72 hours. No lab exists for component: CKRMB, TROIP   Coagulation No results for input(s): PTP, INR, APTT, INREXT in the last 72 hours. Lipid Panel No results found for: CHOL, CHOLPOCT, CHOLX, CHLST, CHOLV, 648624, HDL, HDLP, LDL, LDLC, DLDLP, 999817, VLDLC, VLDL, TGLX, TRIGL, TRIGP, TGLPOCT, CHHD, CHHDX   BNP No results for input(s): BNPP in the last 72 hours. Liver Enzymes No results for input(s): TP, ALB, TBIL, AP in the last 72 hours. No lab exists for component: SGOT, GPT, DBIL   Thyroid Studies No results found for: T4, T3U, TSH, TSHEXT         Significant Diagnostic Studies: NM LUNG SCAN PERF    Result Date: 9/2/2021  Perfusion only lung scan. History:  Shortness of breath, positive d-dimer. Comparison:  Portable chest earlier the same day Technique: Perfusion imaging was performed after intravenous injection of 7.2 millicuries of Tc 81I MAA followed by imaging in multiple projections. Injection site: Right hand vein Findings: Perfusion imaging shows mild inhomogeneity along the peripheral aspect of the lungs. No focal segmental perfusion defect is seen. No discrete perfusional abnormality to suggest the presence of pulmonary embolism.     XR CHEST PA LAT    Result Date: 9/6/2021  EXAM: Chest Radiography CLINICAL INDICATION/HISTORY: Cough, shortness of breath;   > Additional: None COMPARISON: September 4 TECHNIQUE: PA and lateral _______________ FINDINGS: HEART AND MEDIASTINUM: Normal heart size with normal mediastinal contours. LUNGS AND PLEURAL SPACES: Hyperinflated but clear. No infiltrates or effusions. BONY THORAX AND SOFT TISSUES: Unremarkable. _______________     No active cardiopulmonary disease. CT CHEST WO CONT    Result Date: 9/2/2021  EXAM: CT Chest INDICATION: Tachycardia and hypoxia. . COMPARISON: Radiographs same day. No prior CT imaging available for review/comparison. TECHNIQUE: Axial CT imaging from the thoracic inlet through the diaphragm without  intravenous contrast. Multiplanar reformations were generated. One or more dose reduction techniques were used on this CT: automated exposure control, adjustment of the mAs and/or kVp according to patient size, and iterative reconstruction techniques. The specific techniques used on this CT exam have been documented in the patient's electronic medical record. Digital Imaging and Communications in Medicine (DICOM) format image data are available to nonaffiliated external healthcare facilities or entities on a secure, media free, reciprocally searchable basis with patient authorization for at least a 12-month period after this study. _______________ FINDINGS: LUNGS:   > No alveolar consolidation. No significant groundglass abnormality or abnormal septal line thickening.   > Small cluster of 2 to 3 mm subpleural pulmonary nodules within the peripheral right lower lobe (image numbers [id number]). > 2 to 3 mm fissural nodule along the left major fissure (image 69). > 3 mm nodule posterior left upper lobe (image 34)   > 4 mm medial left upper lobe nodule (image 67) PLEURA: Unremarkable. AIRWAY: Diffuse bronchial wall thickening is present with several scattered foci of endobronchial mucoid impaction. MEDIASTINUM: Included thyroid gland is unremarkable. Cardiac size normal. Multivessel coronary arterial atherosclerosis. Thoracic aorta is normal in course/caliber with diffuse atherosclerotic calcifications.  LYMPH NODES: No enlarged lymph nodes by size criteria. UPPER ABDOMEN: Excreted contrast is present within the renal collecting systems from prior attempted PE protocol. Small gallstone within the gallbladder. OSSEOUS STRUCTURES: No acute or aggressive appearing osseous abnormality. Prominent Schmorl's node superior endplate H20. OTHER: _______________     1. Diffuse bronchial wall thickening and scattered foci of endobronchial mucoid impaction with several faint centrilobular distribution nodules which are very likely infectious or inflammatory in nature. 2. Several small additional pulmonary nodules as described above. Please see below guidelines for follow-up. 3. Cholelithiasis. ======== Fleischner Society pulmonary nodule guidelines (revised 2017): Multiple solid nodules <6 mm: -Low risk for lung cancer: No follow-up. -High risk for lung cancer: Optional chest CT in 12 months. XR CHEST PORT    Result Date: 9/4/2021  EXAM: CHEST RADIOGRAPH CLINICAL INDICATION/HISTORY: ? CHF -Additional: Shortness of breath with cough COMPARISON: Yesterday TECHNIQUE: Portable frontal view of the chest _______________ FINDINGS: SUPPORT DEVICES: None. HEART AND MEDIASTINUM: No appreciable cardiomegaly. Remaining mediastinal contours within normal limits. LUNGS AND PLEURAL SPACES: There are some minimally prominent streaky markings in each lung base. No airspace disease. No effusions. No vascular congestion. BONY THORAX AND SOFT TISSUES: Unremarkable. _______________     Questionable minimal interstitial infiltrates in each lung base, far from definite. Otherwise negative. XR CHEST PORT    Result Date: 9/3/2021  EXAM:  PORTABLE CHEST INDICATION:  Shortness of breath. Cough. TECHNIQUE:  Portable, AP view. COMPARISON:  09/02/2021 ____________________ FINDINGS:  SUPPORT DEVICES: None. HEART AND MEDIASTINUM: Cardiomediastinal silhouette appears within normal limits. Normal caliber thoracic aorta.  LUNGS AND PLEURAL SPACES: Lungs are better inflated with no confluent airspace opacity or pulmonary edema. No pleural effusion or pneumothorax. BONY THORAX AND SOFT TISSUES: No acute osseous abnormality. ____________________     No significant change since 09/02/2021 without acute cardiopulmonary disease. XR CHEST PORT    Result Date: 9/2/2021  EXAM:  PORTABLE CHEST INDICATION:  Shortness of breath. TECHNIQUE:  Portable, AP view, 2 films. COMPARISON:  None. ____________________ FINDINGS:  SUPPORT DEVICES: None. HEART AND MEDIASTINUM: Cardiomediastinal silhouette appears within normal limits. Normal caliber thoracic aorta. LUNGS AND PLEURAL SPACES: Lungs are hyperinflated with no confluent airspace opacity or pulmonary edema. No pleural effusion or pneumothorax. BONY THORAX AND SOFT TISSUES: No acute osseous abnormality. ____________________     Hyperinflation without acute cardiopulmonary disease. ECHO ADULT COMPLETE    Result Date: 9/2/2021  · Echo study was limited due to patient's condition. Image quality for this study was technically difficult. Contrast used: DEFINITY. · LV: Estimated LVEF is 65 - 70%. Small left ventricle. Moderate septal wall hypertrophy. Mild mid-cavity obstruction. Globally reduced systolic function. Hyperdynamic systolic function. Due to elevated heart rate. . · Due to technical limitation and inability of the patient to lie down unable to assess M-mode through mitral valve. DUPLEX LOWER EXT VENOUS BILAT    Result Date: 9/3/2021  · No evidence of deep vein thrombosis in the right lower extremity. · No evidence of acute deep vein thrombosis in the left lower extremity veins assessed. No results found for this or any previous visit. Please note that this dictation was completed with [employer], the [employer] voice recognition software. Quite often unanticipated grammatical, syntax, homophones, and other interpretive errors are inadvertently transcribed by the computer software. Please disregard these errors.   Please excuse any errors that have escaped final proofreading.      CC: David Kidd MD

## 2021-09-08 NOTE — PROGRESS NOTES
Problem: Breathing Pattern - Ineffective  Goal: *Absence of hypoxia  Outcome: Progressing Towards Goal  Goal: *Use of effective breathing techniques  Outcome: Progressing Towards Goal     Problem: Patient Education: Go to Patient Education Activity  Goal: Patient/Family Education  Outcome: Progressing Towards Goal     Problem: Falls - Risk of  Goal: *Absence of Falls  Description: Document Rafal Melendez Fall Risk and appropriate interventions in the flowsheet.   Outcome: Progressing Towards Goal  Note: Fall Risk Interventions:  Mobility Interventions: Bed/chair exit alarm, Communicate number of staff needed for ambulation/transfer, OT consult for ADLs, PT Consult for mobility concerns, Patient to call before getting OOB, PT Consult for assist device competence, Strengthening exercises (ROM-active/passive), Utilize walker, cane, or other assistive device    Mentation Interventions: Adequate sleep, hydration, pain control, Update white board    Medication Interventions: Bed/chair exit alarm, Evaluate medications/consider consulting pharmacy, Patient to call before getting OOB, Teach patient to arise slowly    Elimination Interventions: Bed/chair exit alarm, Call light in reach, Elevated toilet seat, Patient to call for help with toileting needs, Stay With Me (per policy), Toilet paper/wipes in reach, Toileting schedule/hourly rounds              Problem: Patient Education: Go to Patient Education Activity  Goal: Patient/Family Education  Outcome: Progressing Towards Goal     Problem: Gas Exchange - Impaired  Goal: *Absence of hypoxia  Outcome: Progressing Towards Goal     Problem: Patient Education: Go to Patient Education Activity  Goal: Patient/Family Education  Outcome: Progressing Towards Goal     Problem: Patient Education: Go to Patient Education Activity  Goal: Patient/Family Education  Outcome: Progressing Towards Goal

## 2021-09-09 ENCOUNTER — HOME CARE VISIT (OUTPATIENT)
Dept: SCHEDULING | Facility: HOME HEALTH | Age: 71
End: 2021-09-09
Payer: MEDICARE

## 2021-09-09 ENCOUNTER — HOME CARE VISIT (OUTPATIENT)
Dept: HOME HEALTH SERVICES | Facility: HOME HEALTH | Age: 71
End: 2021-09-09
Payer: MEDICARE

## 2021-09-09 VITALS
SYSTOLIC BLOOD PRESSURE: 171 MMHG | HEART RATE: 85 BPM | DIASTOLIC BLOOD PRESSURE: 72 MMHG | OXYGEN SATURATION: 94 % | RESPIRATION RATE: 17 BRPM | TEMPERATURE: 99 F

## 2021-09-09 LAB
A ALTERNATA IGE QN: <0.1 KU/L
A FUMIGATUS IGE QN: <0.1 KU/L
AMER ROACH IGE QN: <0.1 KU/L
AMER SYCAMORE IGE QN: NORMAL KU/L
BAHIA GRASS IGE QN: <0.1 KU/L
BERMUDA GRASS IGE QN: <0.1 KU/L
BOXELDER IGE QN: <0.1 KU/L
C HERBARUM IGE QN: <0.1 KU/L
CAT DANDER IGG QN: <0.1 KU/L
CLASS DESCRIPTION, 600268: NORMAL
COMMON RAGWEED IGE QN: <0.1 KU/L
D FARINAE IGE QN: <0.1 KU/L
D PTERONYSS IGE QN: <0.1 KU/L
DEPRECATED IGE QN: <0.1 KU/L
DOG DANDER IGE QN: <0.1 KU/L
ENGL PLANTAIN IGE QN: <0.1 KU/L
JOHNSON GRASS IGE QN: <0.1 KU/L
M RACEMOSUS IGE QN: NORMAL KU/L
MT JUNIPER IGE QN: <0.1 KU/L
MUGWORT IGE QN: <0.1 KU/L
NETTLE IGE QN: NORMAL KU/L
P NOTATUM IGE QN: <0.1 KU/L
S BOTRYOSUM IGE QN: NORMAL KU/L
SHEEP SORREL IGE QN: NORMAL KU/L
SWEET GUM IGE QN: NORMAL KU/L
TIMOTHY IGE QN: <0.1 KU/L
WHITE BIRCH IGE QN: NORMAL KU/L
WHITE ELM IGG QN: <0.1 KU/L
WHITE HICKORY IGE QN: NORMAL KU/L
WHITE MULBERRY IGE QN: NORMAL KU/L
WHITE OAK IGE QN: <0.1 KU/L

## 2021-09-09 PROCEDURE — 400013 HH SOC

## 2021-09-09 PROCEDURE — G0151 HHCP-SERV OF PT,EA 15 MIN: HCPCS

## 2021-09-09 NOTE — PROGRESS NOTES
Physician Progress Note      PATIENT:               Venice Glover  CSN #:                  [id number]  :                       1950  ADMIT DATE:       2021 9:02 AM  DISCH DATE:        2021 4:42 PM  RESPONDING  PROVIDER #:        Monet Olson MD          QUERY TEXT:    Patient admitted with COPD exacerbation. Patient noted to have acute distress with accessory muscle usage, O2 @5-6L. If possible, please document in the progress notes and discharge summary if you are evaluating and/or treating any of the following: The medical record reflects the following:  Risk Factors: COPD exacerbation; smoker  Clinical Indicators: RR in ER 19-36; ER notes:   She is in acute distress;  Effort: Accessory muscle usage, prolonged expiration and respiratory distress (Mild to moderate) present. 21  Cardiology consult:  Acute respiratory failure with hypoxia  Treatment: O2 5-6L NC; ABGs; Pulmicort; Solumedrol; Nebs PRN    Thank you,  Ju Summers RN/CCDS  [phone]  Options provided:  -- Acute respiratory failure with hypoxia  -- Acute respiratory failure with hypercapnia  -- Other - I will add my own diagnosis  -- Disagree - Not applicable / Not valid  -- Disagree - Clinically unable to determine / Unknown  -- Refer to Clinical Documentation Reviewer    PROVIDER RESPONSE TEXT:    This patient is in acute respiratory failure with hypoxia.     Query created by: Wendy Horowitz on 9/3/2021 10:06 AM      Electronically signed by:  Monet Olson MD 2021 10:09 AM

## 2021-09-09 NOTE — Clinical Note
Larry Oliveira is a 79 y.o. female referred s/p hospitalization for COPD exacerbation. PMH: tobacco use, HTN, cholelithiasis, hyponatremia, elevated troponin level and sinus tachycardia. Pt reports h/o R femur fx with ORIF '20 and HLD. PT, RN and OT orders. She has a f/u with her PCP 9/15.  Plan: 1W1, 2W1, 4L2

## 2021-09-10 ENCOUNTER — HOME CARE VISIT (OUTPATIENT)
Dept: SCHEDULING | Facility: HOME HEALTH | Age: 71
End: 2021-09-10
Payer: MEDICARE

## 2021-09-10 PROCEDURE — G0299 HHS/HOSPICE OF RN EA 15 MIN: HCPCS

## 2021-09-10 NOTE — HOME HEALTH
PMH/Summary of clinical condition: Ross Dove is a 79 y.o. female referred s/p hospitalization for COPD exacerbation. PMH: tobacco use, HTN, cholelithiasis, hyponatremia, elevated troponin level and sinus tachycardia. Pt reports h/o R femur fx with ORIF '20 and HLD. PT, RN and OT orders. She has a f/u with her PCP 9/15. Plan: 1W1, 2W1, 1W1. Medications reconciliation completed. No changes in medications at this time. Medications are effective at this time. Social history/ caregivers: lives with her daughter in law, son, granddaughter and 3 dogs in a two story home with 4 steps to enter. She requires assistance from all caregivers for ADL's, IADL's, medication management and transportation. Pt/Caregiver instructed on plan of care and are agreeable to plan of care at this time. Plan of care and admission to home health status reported to attending physician:Elham Dalton MD   Discharge planning discussed with patient and caregiver. Discharge planning as follows: DC to self and family under MD supervision when all goals are met or maximum potential is reached  Pt/Caregiver did verbalize understanding. Patient education provided this visit: safety, fall risk, HEP, need for assistance at all times with transfers and ambulation  Home exercise program: 1. always have someone with her for assistance when transferring or ambulating   2. stand and walk short distances every hour during the day to increase strength, provide pressure relief and increase independence with transfers  3. Patient/CG instructed in a  HEP as follows: COPD training program initiated as follows: pursed lipped breathing x 4 reps, toe taps and LAQ x 30 reps 2-3 times daily. Continued need for the following skills: MD referral for HHPT following recent hospital stay.  HHPT is medically necessary to address  dx and clinical findings:  decreased strength, impaired gait, decreased ability w stair negotiation, increased swelling, decreased transfer status, decreased endurance, decreased balance and decreased safety, increased pain in order to improve functional mobility/quality of life, decrease burden of care, reduce risk for re-hospitalization, work towards patient's personal goals of return to PLOF with decrease risk for falls.

## 2021-09-11 VITALS
TEMPERATURE: 97.8 F | RESPIRATION RATE: 16 BRPM | HEART RATE: 53 BPM | SYSTOLIC BLOOD PRESSURE: 124 MMHG | OXYGEN SATURATION: 93 % | DIASTOLIC BLOOD PRESSURE: 74 MMHG

## 2021-09-11 NOTE — HOME HEALTH
Skilled reason for visit: Patient was recently hospitalized for COPD, requiring observation by a SN for s/s of decomposition or adverse effects resulting from newly prescribed medications. Skilled observation needed to determine if new medication regimen prescribed requires modifications or other therapeutic interventions considered until pt's clinical condition or treatment has stabilized. Caregiver involvement:  Patient's caregiver is her family. Caregiver assists patient with bathing, dressing, walking, bathroom, meal prep and setup, medication management, grocery shopping, household chores, transportation to MD appointment and home exercise program.  Medications reconciled and all medications are available in the home this visit. The following education was provided regarding medications, medication interactions, and look alike medications:  albuterol-ipratropium (DUO-NEB) 2.5 mg-0.5 mg/3 ml nebu  -  Report medication questions or  problems to [address] or MD.  Medications are effective at this time. Patient states understanding. Patient education provided this visit:  . ARMAND Holley Disease Management: COPD, Tachycardia, smoking cessation, pain management, constipation prevention, fall precautions, s/s of infection, deep breathing exercises. .x.... Breathing Exercises (HEP):): Reviewed HEP in admission packet - patient performed return demonstration of deep breathing exercise. Patient to perform 5 deep breathing exercises every 2 hours, while awake. Franco Vizcarra.. Meli Holley Diet/Nutrition: Importance of a healthy, AHA diet, to promote healing and wellness, proper hydration, pineapple juice for inflammation and bruising  . Franco Vizcarra... Infection Control:   Proper hand washing with soap and water, after each trip to the bathroom and after eating meals  . Franco Vizcarra... Other:  safety and fall precautions, importance of repositioning self to reduce pressure points,   Goals/teaching progressing. Patient's goal is to have less SOB.  Patient has remained free from falls, free from infection; no safety concerns at this time and is ambulating independently with walker. SN to complete education of patient and patient to follow up with Dr Con Day exercise program: PT, OT  Continued need for the following skills: Nursing, PT, OT  Patient and/or caregiver notified and agree to changes in the Plan of Care: No changes  The following discharge planning was discussed with the pt/caregiver:  SN to continue education of patient and discharge patient when teaching and goals are met. 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

## 2021-09-13 ENCOUNTER — HOME CARE VISIT (OUTPATIENT)
Dept: SCHEDULING | Facility: HOME HEALTH | Age: 71
End: 2021-09-13
Payer: MEDICARE

## 2021-09-13 VITALS
DIASTOLIC BLOOD PRESSURE: 70 MMHG | OXYGEN SATURATION: 96 % | HEART RATE: 66 BPM | TEMPERATURE: 98.2 F | SYSTOLIC BLOOD PRESSURE: 110 MMHG | RESPIRATION RATE: 15 BRPM

## 2021-09-13 PROCEDURE — G0152 HHCP-SERV OF OT,EA 15 MIN: HCPCS

## 2021-09-13 NOTE — HOME HEALTH
Summary of clinical condition:  Pt developed progressive SOB. After 2 days she called EMS and was seen in ED. She was admitted on 9/2/2021 with Dx of COPD exacerbation. She was started on IV steroids and improved. She was discharged home on 9/8/2021 with orders for [facility] OT. Medical History: COPD, HTN, High cholesterol,   Medications review completed. No adverse reactions noted. Caregiver involvement: Pt's daughter assists with IADLs. I explained need for raised toilet seat and she voiced understanding. Patient education provided this visit:   Pt was educated why she needs a raised toilet seat. She verbalized understanding but did not think she would get one. Home exercise program:  None needed    ASSESSMENT:  Pt demonstrated the abo=ility to dress UE and LE without assistance. Her O2 saturation increased from 93% to 98% after dressing. Pt demonstrated safe and independent transfer in and out of bed and from chair. Pt was not safe with toilet and shower transfer. She was trained in back step method of shower transfer  using her walker and is now safe. She verbalized understanding of need for raised toilet seat but declined. Pt was able to demonstrate showering while sitting on her shower chair. Pt has full ROM in both UE and 5/5 strength throughout. Pt was not cooking or cleaning for the last year and did not want to start now. After training for shower transfer and education on AE, pt is at [facility] and does not need further OT. Pt is discharged. Pt/Caregiver instructed on plan of care and are agreeable to plan of care at this time.

## 2021-09-14 ENCOUNTER — HOME CARE VISIT (OUTPATIENT)
Dept: HOME HEALTH SERVICES | Facility: HOME HEALTH | Age: 71
End: 2021-09-14
Payer: MEDICARE

## 2021-09-16 ENCOUNTER — HOME CARE VISIT (OUTPATIENT)
Dept: SCHEDULING | Facility: HOME HEALTH | Age: 71
End: 2021-09-16
Payer: MEDICARE

## 2021-09-16 PROCEDURE — G0157 HHC PT ASSISTANT EA 15: HCPCS

## 2021-09-17 ENCOUNTER — HOME CARE VISIT (OUTPATIENT)
Dept: SCHEDULING | Facility: HOME HEALTH | Age: 71
End: 2021-09-17
Payer: MEDICARE

## 2021-09-17 VITALS
SYSTOLIC BLOOD PRESSURE: 124 MMHG | OXYGEN SATURATION: 97 % | DIASTOLIC BLOOD PRESSURE: 68 MMHG | TEMPERATURE: 97.6 F | HEART RATE: 104 BPM

## 2021-09-17 PROCEDURE — G0157 HHC PT ASSISTANT EA 15: HCPCS

## 2021-09-17 NOTE — HOME HEALTH
SUBJECTIVE: Patient states she is doing okay, just gets tired. CAREGIVER INVOLVEMENT/ASSISTANCE NEEDED FOR: Lavonne Dietrich, son, assist with cooking. HOME HEALTH SUPPLIES BY TYPE AND QUANTITY ORDERED/DELIVERED THIS VISIT INCLUDE: none  OBJECTIVE:  See interventions. ASSESSMENT OF PROGRESS TOWARD GOALS: Pt roxanne COPD fairly today, SOB noted, and O2 stats decreased from 97 to 93, then returned within 1 min. CONTINUED NEED FOR THE FOLLOWING SKILLS: CONT PT to address weakness, ROM, MMT, gait training, transfers, bed mob and other goals. PLAN FOR NEXT VISIT: CONT PT for 1wk1, 1wk1  THE FOLLOWING DISCHARGE PLANNING WAS DISCUSSED WITH THE PATIENT/CAREGIVER: DC PT when goals are to met to self care.

## 2021-09-18 VITALS
HEART RATE: 85 BPM | TEMPERATURE: 97.3 F | DIASTOLIC BLOOD PRESSURE: 68 MMHG | SYSTOLIC BLOOD PRESSURE: 92 MMHG | OXYGEN SATURATION: 100 % | RESPIRATION RATE: 16 BRPM

## 2021-09-18 NOTE — HOME HEALTH
Skilled reason for visit: Patient was recently hospitalized for  COPD, requiring observation by a SN for s/s of decomposition or adverse effects resulting from newly prescribed medications. Skilled observation needed to determine if new medication regimen prescribed requires modifications or other therapeutic interventions considered until pt's clinical condition or treatment has stabilized. Caregiver involvement: Patient lives with her family. Caregiver assists patient with meal prep and setup, medication management, grocery shopping, household chores, transportation to MD appointment and home exercise program.    Medications reconciled and all medications are available in the home this visit. The following education was provided regarding medications, medication interactions, and look alike medications:  Report medication questions or  problems to [address] or MD.  Medications are effective at this time. Patient states understanding. Patient education provided this visit:  Reviewed all meds with patient on discharge, all questions addressed and answered. Progress toward goals: Goals/teaching completed. Patient is safe at home. SN completed education of patient and patient to follow up with PCP  Home exercise program: PT   Continued need for the following skills: PT  Patient and/or caregiver notified and agrees to changes in the Plan of Care YES  - 5 day notification of DC was discussed with patient/cg on last SN visit. The following discharge planning was discussed with the pt/caregiver: SN discharge with teaching and goals met.   Patient states understanding of patient's meds, precautions and interactions      SN discipline discharge 9/17/21 - case communication with PT and OT

## 2021-09-20 ENCOUNTER — HOME CARE VISIT (OUTPATIENT)
Dept: HOME HEALTH SERVICES | Facility: HOME HEALTH | Age: 71
End: 2021-09-20
Payer: MEDICARE

## 2021-09-20 VITALS
HEART RATE: 84 BPM | OXYGEN SATURATION: 99 % | DIASTOLIC BLOOD PRESSURE: 80 MMHG | SYSTOLIC BLOOD PRESSURE: 132 MMHG | TEMPERATURE: 98.2 F

## 2021-09-20 NOTE — HOME HEALTH
SUBJECTIVE: Patient says she was tired and sore yesterday. CAREGIVER INVOLVEMENT/ASSISTANCE NEEDED FOR: Nitesh Todd, son, assist with dressing  HOME HEALTH SUPPLIES BY TYPE AND QUANTITY ORDERED/DELIVERED THIS VISIT INCLUDE: none  OBJECTIVE:  See interventions. ASSESSMENT OF PROGRESS TOWARD GOALS: Pt able to improved COPD protocol reps from 10-15 with no decrease in O2 stats and only raise in HR by 5 points. Patient improved gait distance from  to 220 ft. Pt improved MMT from 3+/5 to 4-/5. PLAN FOR NEXT VISIT: DC PT next visit  THE FOLLOWING DISCHARGE PLANNING WAS DISCUSSED WITH THE PATIENT/CAREGIVER: DC PT when goals are to met to self care.

## 2021-09-21 ENCOUNTER — HOME CARE VISIT (OUTPATIENT)
Dept: SCHEDULING | Facility: HOME HEALTH | Age: 71
End: 2021-09-21
Payer: MEDICARE

## 2021-09-21 VITALS
RESPIRATION RATE: 16 BRPM | DIASTOLIC BLOOD PRESSURE: 62 MMHG | TEMPERATURE: 98.1 F | OXYGEN SATURATION: 98 % | SYSTOLIC BLOOD PRESSURE: 90 MMHG | HEART RATE: 68 BPM

## 2021-09-21 PROCEDURE — G0151 HHCP-SERV OF PT,EA 15 MIN: HCPCS

## 2021-09-21 NOTE — Clinical Note
Stepan Kramer received skilled PT and RN s/p hospitalization for COPD. This patient has currently met all goals and has been discharged to a HEP and is discharged under the care and supervision of her family and PCP at this time. The following goals have been met: TUG 15 seconds, FTSTS 17 seconds, Tinetti 24/28 and pt reports 75% overall subjective improvement rating. She is ambulating MI with a 4WW and is independent with transfers and a daily HEP. Patient verbalized understanding of all discharge instructions and is in agreement with discharge this visit.

## 2021-09-22 NOTE — PROGRESS NOTES
Physician Progress Note      PATIENT:               Venice Glover  CSN #:                  [id number]  :                       1950  ADMIT DATE:       2021 9:02 AM  DISCH DATE:        2021 4:42 PM  RESPONDING  PROVIDER #:        Monet Olson MD          QUERY TEXT:    Pt admitted with Acute exacerbation of COPD and acute hypoxic respiratory failure. Diastolic CHF documented by pulmonary consultant. . If possible, please document in progress notes and discharge summary further specificity regarding the type and acuity of CHF:    The medical record reflects the following:  Risk Factors: Hypertensive heart  Clinical Indicators: Pulm consult- Additional diastolic CHF and positive trop hypertensive heart ? Troponins- 1158-> 3499->  3965  9/3 echo- ? Echo study was limited due to patient's condition. Image quality for this study was technically difficult. Contrast used: DEFINITY. ? LV: Estimated LVEF is 65 - 70%. Small left ventricle. Moderate septal wall hypertrophy. Mild mid-cavity obstruction. Globally reduced systolic function. Hyperdynamic systolic function. Due to elevated heart rate. CXR- admitting- Hyperinflation without acute cardiopulmonary disease.  CXR- Questionable minimal interstitial infiltrates in each lung base, far from definite. Otherwise negative  Treatment: Already on ARB and blocker, add IV Lasix    Thank you,  Kayley Lim RN, CCDS  [phone]  Options provided:  -- Acute on Chronic Diastolic CHF/HFpEF  -- Acute Diastolic CHF/HFpEF  -- Chronic Diastolic CHF/HFpEF  -- Other - I will add my own diagnosis  -- Disagree - Not applicable / Not valid  -- Disagree - Clinically unable to determine / Unknown  -- Refer to Clinical Documentation Reviewer    PROVIDER RESPONSE TEXT:    This patient has chronic diastolic CHF/HFpEF. Query created by:  Alfonso Berman on 2021 11:50 AM      Electronically signed by:  Monet Olson MD 2021 7:37 PM

## 2021-09-23 NOTE — HOME HEALTH
S: Pt stated she was doing so much better. O: PAIN: Pt reports chronic back pain that she does not let interfer with her function and manages with medication and rest.   WOUND:no open wounds noted or reported. ROM: no impairments noted   STRENGTH:Patient demonstrates decreased muscle strength as follows:  R hip flex 4/5  R hip abd 4/5  R hip add 4/5  R hip ext 4/5  R knee flex 4/5  R knee ext 4/5  R ankle DF 4/5  L hip flex 4/5  L hip abd 4/5  L hip ext 4/5  L hip add 4/5  L knee flex 4/5  L knee ext 4/5  L ankle DF 4/5  FTSTS goal met   BED MOBILITY: independent   EQUIPMENT:4WW  TRANSFERS: MI  GAIT: >150 ft with a 0YT, MI  STEPS: MI on steps to exit/enter home for MD apts. BALANCE: Tinetti 24/28 and TUG 15 seconds -pt has been free from falls. A:ASSESSMENT AND PROGRESS TOWARD GOALS:  Anastasia Hooker received skilled PT and RN s/p hospitalization for COPD. This patient has currently met all goals and has been discharged to a HEP and is discharged under the care and supervision of her family and PCP at this time. The following goals have been met: TUG 15 seconds, FTSTS 17 seconds, Tinetti 24/28 and pt reports 75% overall subjective improvement rating. She is ambulating MI with a 4WW and is independent with transfers and a daily HEP. Patient verbalized understanding of all discharge instructions and is in agreement with discharge this visit. P:DC          1. Discharge medication list updated and reviewed with patient and caregiver. Questions regarding medications answered and patient/caregiver advised to refer to MD for any    medication questions after discharge. 2. Patient to continue use of the following assistive device for maximum safety: 4WW  3. Today's treatment included: review of therapeutic exercise program, reassessment of mobility, transfers, balance and gait. See interventions section. 4. Patient and caregiver demonstrate understanding of DC instructions and repeat verbalization.  Patient and caregiver given written copy of instructions. 5. Patient and caregiver given notification of discharge and in agreement with DC this date. 6. MD notified of discharge.

## 2022-03-18 PROBLEM — R00.0 SINUS TACHYCARDIA: Status: ACTIVE | Noted: 2021-09-07

## 2022-03-18 PROBLEM — Z72.0 TOBACCO USE: Status: ACTIVE | Noted: 2021-09-02

## 2022-03-18 PROBLEM — R77.8 TROPONIN LEVEL ELEVATED: Status: ACTIVE | Noted: 2021-09-04

## 2022-03-18 PROBLEM — E87.1 HYPONATREMIA: Status: ACTIVE | Noted: 2021-09-02

## 2022-03-19 PROBLEM — J44.1 COPD WITH ACUTE EXACERBATION (HCC): Status: ACTIVE | Noted: 2021-09-02

## 2022-03-19 PROBLEM — K80.20 CHOLELITHIASIS: Status: ACTIVE | Noted: 2021-09-02

## 2022-04-21 ENCOUNTER — APPOINTMENT (OUTPATIENT)
Dept: GENERAL RADIOLOGY | Age: 72
DRG: 193 | End: 2022-04-21
Attending: PHYSICIAN ASSISTANT
Payer: MEDICARE

## 2022-04-21 ENCOUNTER — HOSPITAL ENCOUNTER (INPATIENT)
Age: 72
LOS: 3 days | Discharge: HOME HEALTH | DRG: 193 | End: 2022-04-24
Attending: EMERGENCY MEDICINE | Admitting: HOSPITALIST
Payer: MEDICARE

## 2022-04-21 PROBLEM — J96.91 RESPIRATORY FAILURE WITH HYPOXIA (HCC): Status: ACTIVE | Noted: 2022-04-21

## 2022-04-21 PROBLEM — J18.9 PNEUMONIA: Status: ACTIVE | Noted: 2022-04-21

## 2022-04-21 LAB
ALBUMIN SERPL-MCNC: 3.1 G/DL (ref 3.4–5)
ALBUMIN/GLOB SERPL: 0.7 (ref 0.8–1.7)
ALP SERPL-CCNC: 93 U/L (ref 45–117)
ALT SERPL-CCNC: 21 U/L (ref 13–56)
ANION GAP SERPL CALC-SCNC: 7 MMOL/L (ref 3–18)
APPEARANCE UR: CLEAR
ARTERIAL PATENCY WRIST A: POSITIVE
AST SERPL-CCNC: 25 U/L (ref 10–38)
BACTERIA URNS QL MICRO: ABNORMAL /HPF
BASE DEFICIT BLD-SCNC: 1.7 MMOL/L
BASOPHILS # BLD: 0.1 K/UL (ref 0–0.1)
BASOPHILS NFR BLD: 1 % (ref 0–2)
BDY SITE: ABNORMAL
BILIRUB SERPL-MCNC: 0.4 MG/DL (ref 0.2–1)
BILIRUB UR QL: NEGATIVE
BNP SERPL-MCNC: 536 PG/ML (ref 0–900)
BUN SERPL-MCNC: 19 MG/DL (ref 7–18)
BUN/CREAT SERPL: 13 (ref 12–20)
CALCIUM SERPL-MCNC: 9.2 MG/DL (ref 8.5–10.1)
CHLORIDE SERPL-SCNC: 96 MMOL/L (ref 100–111)
CO2 SERPL-SCNC: 25 MMOL/L (ref 21–32)
COLOR UR: YELLOW
COVID-19 RAPID TEST, COVR: NOT DETECTED
CREAT SERPL-MCNC: 1.41 MG/DL (ref 0.6–1.3)
DIFFERENTIAL METHOD BLD: ABNORMAL
EOSINOPHIL # BLD: 0.3 K/UL (ref 0–0.4)
EOSINOPHIL NFR BLD: 2 % (ref 0–5)
EPITH CASTS URNS QL MICRO: ABNORMAL /LPF (ref 0–5)
ERYTHROCYTE [DISTWIDTH] IN BLOOD BY AUTOMATED COUNT: 12.9 % (ref 11.6–14.5)
FLUAV AG NPH QL IA: NEGATIVE
FLUBV AG NOSE QL IA: NEGATIVE
GAS FLOW.O2 O2 DELIVERY SYS: ABNORMAL
GLOBULIN SER CALC-MCNC: 4.5 G/DL (ref 2–4)
GLUCOSE BLD STRIP.AUTO-MCNC: 189 MG/DL (ref 70–110)
GLUCOSE SERPL-MCNC: 112 MG/DL (ref 74–99)
GLUCOSE UR STRIP.AUTO-MCNC: NEGATIVE MG/DL
HCO3 BLD-SCNC: 21.5 MMOL/L (ref 22–26)
HCT VFR BLD AUTO: 36 % (ref 35–45)
HGB BLD-MCNC: 12 G/DL (ref 12–16)
HGB UR QL STRIP: NEGATIVE
IMM GRANULOCYTES # BLD AUTO: 0.1 K/UL (ref 0–0.04)
IMM GRANULOCYTES NFR BLD AUTO: 1 % (ref 0–0.5)
KETONES UR QL STRIP.AUTO: 15 MG/DL
LACTATE SERPL-SCNC: 1.4 MMOL/L (ref 0.4–2)
LEUKOCYTE ESTERASE UR QL STRIP.AUTO: ABNORMAL
LYMPHOCYTES # BLD: 0.8 K/UL (ref 0.9–3.6)
LYMPHOCYTES NFR BLD: 6 % (ref 21–52)
MCH RBC QN AUTO: 30.2 PG (ref 24–34)
MCHC RBC AUTO-ENTMCNC: 33.3 G/DL (ref 31–37)
MCV RBC AUTO: 90.7 FL (ref 78–100)
MONOCYTES # BLD: 0.6 K/UL (ref 0.05–1.2)
MONOCYTES NFR BLD: 4 % (ref 3–10)
NEUTS SEG # BLD: 12.1 K/UL (ref 1.8–8)
NEUTS SEG NFR BLD: 87 % (ref 40–73)
NITRITE UR QL STRIP.AUTO: NEGATIVE
NRBC # BLD: 0 K/UL (ref 0–0.01)
NRBC BLD-RTO: 0 PER 100 WBC
O2/TOTAL GAS SETTING VFR VENT: 21 %
PCO2 BLD: 30.7 MMHG (ref 35–45)
PH BLD: 7.45 (ref 7.35–7.45)
PH UR STRIP: 7 (ref 5–8)
PLATELET # BLD AUTO: 254 K/UL (ref 135–420)
PMV BLD AUTO: 10.2 FL (ref 9.2–11.8)
PO2 BLD: 59 MMHG (ref 80–100)
POTASSIUM SERPL-SCNC: 4.5 MMOL/L (ref 3.5–5.5)
PROT SERPL-MCNC: 7.6 G/DL (ref 6.4–8.2)
PROT UR STRIP-MCNC: NEGATIVE MG/DL
RBC # BLD AUTO: 3.97 M/UL (ref 4.2–5.3)
RBC #/AREA URNS HPF: ABNORMAL /HPF (ref 0–5)
SAO2 % BLD: 92 % (ref 92–97)
SERVICE CMNT-IMP: ABNORMAL
SODIUM SERPL-SCNC: 128 MMOL/L (ref 136–145)
SOURCE, COVRS: NORMAL
SP GR UR REFRACTOMETRY: 1.02 (ref 1–1.03)
SPECIMEN TYPE: ABNORMAL
TROPONIN-HIGH SENSITIVITY: 5 NG/L (ref 0–54)
UROBILINOGEN UR QL STRIP.AUTO: 1 EU/DL (ref 0.2–1)
WBC # BLD AUTO: 13.9 K/UL (ref 4.6–13.2)
WBC URNS QL MICRO: ABNORMAL /HPF (ref 0–5)

## 2022-04-21 PROCEDURE — 74011250636 HC RX REV CODE- 250/636: Performed by: PHYSICIAN ASSISTANT

## 2022-04-21 PROCEDURE — 94640 AIRWAY INHALATION TREATMENT: CPT

## 2022-04-21 PROCEDURE — 71045 X-RAY EXAM CHEST 1 VIEW: CPT

## 2022-04-21 PROCEDURE — 83880 ASSAY OF NATRIURETIC PEPTIDE: CPT

## 2022-04-21 PROCEDURE — 87804 INFLUENZA ASSAY W/OPTIC: CPT

## 2022-04-21 PROCEDURE — 96367 TX/PROPH/DG ADDL SEQ IV INF: CPT

## 2022-04-21 PROCEDURE — 74011250636 HC RX REV CODE- 250/636: Performed by: HOSPITALIST

## 2022-04-21 PROCEDURE — 36600 WITHDRAWAL OF ARTERIAL BLOOD: CPT

## 2022-04-21 PROCEDURE — 74011250637 HC RX REV CODE- 250/637: Performed by: HOSPITALIST

## 2022-04-21 PROCEDURE — 74011250637 HC RX REV CODE- 250/637: Performed by: PHYSICIAN ASSISTANT

## 2022-04-21 PROCEDURE — G0378 HOSPITAL OBSERVATION PER HR: HCPCS

## 2022-04-21 PROCEDURE — 87086 URINE CULTURE/COLONY COUNT: CPT

## 2022-04-21 PROCEDURE — 84484 ASSAY OF TROPONIN QUANT: CPT

## 2022-04-21 PROCEDURE — 74011000250 HC RX REV CODE- 250: Performed by: PHYSICIAN ASSISTANT

## 2022-04-21 PROCEDURE — 87635 SARS-COV-2 COVID-19 AMP PRB: CPT

## 2022-04-21 PROCEDURE — 82803 BLOOD GASES ANY COMBINATION: CPT

## 2022-04-21 PROCEDURE — 80053 COMPREHEN METABOLIC PANEL: CPT

## 2022-04-21 PROCEDURE — 96375 TX/PRO/DX INJ NEW DRUG ADDON: CPT

## 2022-04-21 PROCEDURE — 99285 EMERGENCY DEPT VISIT HI MDM: CPT

## 2022-04-21 PROCEDURE — 74011000250 HC RX REV CODE- 250: Performed by: HOSPITALIST

## 2022-04-21 PROCEDURE — 81001 URINALYSIS AUTO W/SCOPE: CPT

## 2022-04-21 PROCEDURE — 74011250637 HC RX REV CODE- 250/637: Performed by: INTERNAL MEDICINE

## 2022-04-21 PROCEDURE — 74011000258 HC RX REV CODE- 258: Performed by: PHYSICIAN ASSISTANT

## 2022-04-21 PROCEDURE — 96376 TX/PRO/DX INJ SAME DRUG ADON: CPT

## 2022-04-21 PROCEDURE — 51702 INSERT TEMP BLADDER CATH: CPT

## 2022-04-21 PROCEDURE — 96365 THER/PROPH/DIAG IV INF INIT: CPT

## 2022-04-21 PROCEDURE — 87040 BLOOD CULTURE FOR BACTERIA: CPT

## 2022-04-21 PROCEDURE — 93005 ELECTROCARDIOGRAM TRACING: CPT

## 2022-04-21 PROCEDURE — 74011250636 HC RX REV CODE- 250/636: Performed by: INTERNAL MEDICINE

## 2022-04-21 PROCEDURE — 82962 GLUCOSE BLOOD TEST: CPT

## 2022-04-21 PROCEDURE — 65270000046 HC RM TELEMETRY

## 2022-04-21 PROCEDURE — 85025 COMPLETE CBC W/AUTO DIFF WBC: CPT

## 2022-04-21 PROCEDURE — 83605 ASSAY OF LACTIC ACID: CPT

## 2022-04-21 RX ORDER — DILTIAZEM HYDROCHLORIDE 240 MG/1
240 CAPSULE, COATED, EXTENDED RELEASE ORAL DAILY
Status: DISCONTINUED | OUTPATIENT
Start: 2022-04-22 | End: 2022-04-24 | Stop reason: HOSPADM

## 2022-04-21 RX ORDER — ARFORMOTEROL TARTRATE 15 UG/2ML
15 SOLUTION RESPIRATORY (INHALATION) 2 TIMES DAILY
Status: DISCONTINUED | OUTPATIENT
Start: 2022-04-21 | End: 2022-04-21

## 2022-04-21 RX ORDER — TRIAMCINOLONE ACETONIDE 1 MG/G
OINTMENT TOPICAL 2 TIMES DAILY
Status: DISCONTINUED | OUTPATIENT
Start: 2022-04-21 | End: 2022-04-24 | Stop reason: HOSPADM

## 2022-04-21 RX ORDER — BUDESONIDE 0.5 MG/2ML
500 INHALANT ORAL 2 TIMES DAILY
Status: DISCONTINUED | OUTPATIENT
Start: 2022-04-21 | End: 2022-04-21

## 2022-04-21 RX ORDER — INSULIN LISPRO 100 [IU]/ML
INJECTION, SOLUTION INTRAVENOUS; SUBCUTANEOUS
Status: DISCONTINUED | OUTPATIENT
Start: 2022-04-21 | End: 2022-04-24 | Stop reason: HOSPADM

## 2022-04-21 RX ORDER — PANTOPRAZOLE SODIUM 40 MG/1
40 TABLET, DELAYED RELEASE ORAL
Status: DISCONTINUED | OUTPATIENT
Start: 2022-04-21 | End: 2022-04-24 | Stop reason: HOSPADM

## 2022-04-21 RX ORDER — GUAIFENESIN 100 MG/5ML
81 LIQUID (ML) ORAL DAILY
Status: DISCONTINUED | OUTPATIENT
Start: 2022-04-22 | End: 2022-04-24 | Stop reason: HOSPADM

## 2022-04-21 RX ORDER — IPRATROPIUM BROMIDE AND ALBUTEROL SULFATE 2.5; .5 MG/3ML; MG/3ML
3 SOLUTION RESPIRATORY (INHALATION)
Status: COMPLETED | OUTPATIENT
Start: 2022-04-21 | End: 2022-04-21

## 2022-04-21 RX ORDER — CETIRIZINE HYDROCHLORIDE 10 MG/1
10 TABLET ORAL DAILY
Status: DISCONTINUED | OUTPATIENT
Start: 2022-04-22 | End: 2022-04-24 | Stop reason: HOSPADM

## 2022-04-21 RX ORDER — DEXTROSE MONOHYDRATE 100 MG/ML
0-250 INJECTION, SOLUTION INTRAVENOUS AS NEEDED
Status: DISCONTINUED | OUTPATIENT
Start: 2022-04-21 | End: 2022-04-24 | Stop reason: HOSPADM

## 2022-04-21 RX ORDER — ACETAMINOPHEN 325 MG/1
650 TABLET ORAL
Status: DISCONTINUED | OUTPATIENT
Start: 2022-04-21 | End: 2022-04-24 | Stop reason: HOSPADM

## 2022-04-21 RX ORDER — BUDESONIDE 0.5 MG/2ML
500 INHALANT ORAL
Status: DISCONTINUED | OUTPATIENT
Start: 2022-04-21 | End: 2022-04-24 | Stop reason: HOSPADM

## 2022-04-21 RX ORDER — SODIUM CHLORIDE 9 MG/ML
125 INJECTION, SOLUTION INTRAVENOUS ONCE
Status: COMPLETED | OUTPATIENT
Start: 2022-04-21 | End: 2022-04-21

## 2022-04-21 RX ORDER — SODIUM CHLORIDE 9 MG/ML
75 INJECTION, SOLUTION INTRAVENOUS CONTINUOUS
Status: DISCONTINUED | OUTPATIENT
Start: 2022-04-21 | End: 2022-04-24 | Stop reason: HOSPADM

## 2022-04-21 RX ORDER — GUAIFENESIN 600 MG/1
600 TABLET, EXTENDED RELEASE ORAL EVERY 12 HOURS
Status: DISCONTINUED | OUTPATIENT
Start: 2022-04-21 | End: 2022-04-24 | Stop reason: HOSPADM

## 2022-04-21 RX ORDER — GUAIFENESIN 600 MG/1
1200 TABLET, EXTENDED RELEASE ORAL
Status: DISCONTINUED | OUTPATIENT
Start: 2022-04-21 | End: 2022-04-21

## 2022-04-21 RX ORDER — HEPARIN SODIUM 5000 [USP'U]/ML
5000 INJECTION, SOLUTION INTRAVENOUS; SUBCUTANEOUS EVERY 8 HOURS
Status: DISCONTINUED | OUTPATIENT
Start: 2022-04-21 | End: 2022-04-24 | Stop reason: HOSPADM

## 2022-04-21 RX ORDER — IBUPROFEN 200 MG
16 TABLET ORAL AS NEEDED
Status: DISCONTINUED | OUTPATIENT
Start: 2022-04-21 | End: 2022-04-24 | Stop reason: HOSPADM

## 2022-04-21 RX ORDER — GUAIFENESIN 600 MG/1
600 TABLET, EXTENDED RELEASE ORAL
Status: COMPLETED | OUTPATIENT
Start: 2022-04-21 | End: 2022-04-21

## 2022-04-21 RX ORDER — IPRATROPIUM BROMIDE AND ALBUTEROL SULFATE 2.5; .5 MG/3ML; MG/3ML
3 SOLUTION RESPIRATORY (INHALATION)
Status: DISCONTINUED | OUTPATIENT
Start: 2022-04-21 | End: 2022-04-24

## 2022-04-21 RX ORDER — DIPHENHYDRAMINE HYDROCHLORIDE 50 MG/ML
12.5 INJECTION, SOLUTION INTRAMUSCULAR; INTRAVENOUS
Status: DISCONTINUED | OUTPATIENT
Start: 2022-04-21 | End: 2022-04-24 | Stop reason: HOSPADM

## 2022-04-21 RX ORDER — ARFORMOTEROL TARTRATE 15 UG/2ML
15 SOLUTION RESPIRATORY (INHALATION)
Status: DISCONTINUED | OUTPATIENT
Start: 2022-04-21 | End: 2022-04-24 | Stop reason: HOSPADM

## 2022-04-21 RX ORDER — METOPROLOL TARTRATE 50 MG/1
50 TABLET ORAL 2 TIMES DAILY
Status: DISCONTINUED | OUTPATIENT
Start: 2022-04-21 | End: 2022-04-24 | Stop reason: HOSPADM

## 2022-04-21 RX ADMIN — GUAIFENESIN 600 MG: 600 TABLET, EXTENDED RELEASE ORAL at 12:48

## 2022-04-21 RX ADMIN — ARFORMOTEROL TARTRATE 15 MCG: 15 SOLUTION RESPIRATORY (INHALATION) at 20:41

## 2022-04-21 RX ADMIN — SODIUM CHLORIDE 75 ML/HR: 9 INJECTION, SOLUTION INTRAVENOUS at 22:24

## 2022-04-21 RX ADMIN — METHYLPREDNISOLONE SODIUM SUCCINATE 40 MG: 40 INJECTION, POWDER, FOR SOLUTION INTRAMUSCULAR; INTRAVENOUS at 21:01

## 2022-04-21 RX ADMIN — IPRATROPIUM BROMIDE AND ALBUTEROL SULFATE 3 ML: .5; 3 SOLUTION RESPIRATORY (INHALATION) at 20:41

## 2022-04-21 RX ADMIN — TRIAMCINOLONE ACETONIDE: 1 OINTMENT TOPICAL at 21:01

## 2022-04-21 RX ADMIN — METHYLPREDNISOLONE SODIUM SUCCINATE 125 MG: 125 INJECTION, POWDER, FOR SOLUTION INTRAMUSCULAR; INTRAVENOUS at 11:51

## 2022-04-21 RX ADMIN — CEFTRIAXONE SODIUM 2 G: 2 INJECTION, POWDER, FOR SOLUTION INTRAMUSCULAR; INTRAVENOUS at 13:39

## 2022-04-21 RX ADMIN — METOPROLOL TARTRATE 50 MG: 50 TABLET ORAL at 21:02

## 2022-04-21 RX ADMIN — IPRATROPIUM BROMIDE AND ALBUTEROL SULFATE 3 ML: .5; 3 SOLUTION RESPIRATORY (INHALATION) at 23:22

## 2022-04-21 RX ADMIN — DIPHENHYDRAMINE HYDROCHLORIDE 12.5 MG: 50 INJECTION, SOLUTION INTRAMUSCULAR; INTRAVENOUS at 23:17

## 2022-04-21 RX ADMIN — IPRATROPIUM BROMIDE AND ALBUTEROL SULFATE 3 ML: .5; 3 SOLUTION RESPIRATORY (INHALATION) at 11:51

## 2022-04-21 RX ADMIN — GUAIFENESIN 600 MG: 600 TABLET, EXTENDED RELEASE ORAL at 21:02

## 2022-04-21 RX ADMIN — BUDESONIDE 500 MCG: 0.5 INHALANT RESPIRATORY (INHALATION) at 20:41

## 2022-04-21 RX ADMIN — ACETAMINOPHEN 650 MG: 325 TABLET ORAL at 23:17

## 2022-04-21 RX ADMIN — AZITHROMYCIN MONOHYDRATE 500 MG: 500 INJECTION, POWDER, LYOPHILIZED, FOR SOLUTION INTRAVENOUS at 14:03

## 2022-04-21 RX ADMIN — IPRATROPIUM BROMIDE AND ALBUTEROL SULFATE 3 ML: .5; 3 SOLUTION RESPIRATORY (INHALATION) at 14:03

## 2022-04-21 RX ADMIN — SODIUM CHLORIDE 125 ML/HR: 9 INJECTION, SOLUTION INTRAVENOUS at 12:48

## 2022-04-21 NOTE — ED PROVIDER NOTES
EMERGENCY DEPARTMENT HISTORY AND PHYSICAL EXAM    Date: 4/21/2022  Patient Name: Maryse Chery    History of Presenting Illness     Time Seen:11:24 AM    Chief Complaint   Patient presents with    Shortness of Breath       History Provided By: Patient    Additional History (Context):   Maryse Chery is a 70 y.o. female a history of COPD/asthma presents emergency room with a 1 month history of being sick with a respiratory illness. Patient has been followed by her PCP Dr. Pily Krishnamurthy and was being treated for COPD exacerbation. However over the course of the month, she just progressively gotten worse. No documented fever at home. No chills or aches. However extremely weak and tired. Now has become very short of breath. Nonproductive deep cough. Denies any chest pain. No history of cardiac disease. Denies any swelling in her legs. Appetite and fluid intake decreased. Patient knew when she woke up this morning that she needed to come to the hospital due to severity of her illness.     PCP: Shaye Peña MD    Current Facility-Administered Medications   Medication Dose Route Frequency Provider Last Rate Last Admin    cefTRIAXone (ROCEPHIN) 2 g in 0.9% sodium chloride (MBP/ADV) 50 mL MBP  2 g IntraVENous Q24H VALERI Garibay 100 mL/hr at 04/22/22 1245 2 g at 04/22/22 1245    azithromycin (ZITHROMAX) 500 mg in 0.9% sodium chloride 250 mL (VIAL-MATE)  500 mg IntraVENous Q24H VALERI Garibay 250 mL/hr at 04/22/22 1246 500 mg at 04/22/22 1246    albuterol-ipratropium (DUO-NEB) 2.5 MG-0.5 MG/3 ML  3 mL Nebulization Q4H RT Lulu CANTRELL MD   3 mL at 04/22/22 1110    aspirin chewable tablet 81 mg  81 mg Oral DAILY Kurt Talley MD   81 mg at 04/22/22 0931    cetirizine (ZYRTEC) tablet 10 mg  10 mg Oral DAILY Lluu CANTRELL MD   10 mg at 04/22/22 0931    dilTIAZem ER (CARDIZEM CD) capsule 240 mg  240 mg Oral DAILY Kurt Talley MD   240 mg at 04/22/22 0931    guaiFENesin EDOUARD (Anny 598) tablet 600 mg  600 mg Oral Q12H Sabrina CANTRELL MD   600 mg at 04/22/22 0931    metoprolol tartrate (LOPRESSOR) tablet 50 mg  50 mg Oral BID Sabrina CANTRELL MD   50 mg at 04/21/22 2102    glucose chewable tablet 16 g  16 g Oral PRN Julian Johnson MD        glucagon (GLUCAGEN) injection 1 mg  1 mg IntraMUSCular PRN Julian Johnson MD        insulin lispro (HUMALOG) injection   SubCUTAneous AC&HS Julian Johnson MD   4 Units at 04/22/22 1130    dextrose 10% infusion 0-250 mL  0-250 mL IntraVENous PRN Julian Johnson MD        methylPREDNISolone (PF) (SOLU-MEDROL) injection 40 mg  40 mg IntraVENous Q8H Sabrina CANTRELL MD   40 mg at 04/22/22 1900    heparin (porcine) injection 5,000 Units  5,000 Units SubCUTAneous Q8H Sabrina CANTRELL MD   5,000 Units at 04/22/22 1800    pantoprazole (PROTONIX) tablet 40 mg  40 mg Oral ACB&D Julian Johnson MD   40 mg at 04/22/22 1802    [Held by provider] 0.9% sodium chloride infusion  75 mL/hr IntraVENous CONTINUOUS Julian Johnson MD 75 mL/hr at 04/22/22 0643 75 mL/hr at 04/22/22 0643    arformoteroL (BROVANA) neb solution 15 mcg  15 mcg Nebulization BID RT Julian Johnson MD   15 mcg at 04/22/22 0721    budesonide (PULMICORT) 500 mcg/2 ml nebulizer suspension  500 mcg Nebulization BID RT Julian Johnson MD   500 mcg at 04/22/22 5754    triamcinolone acetonide (KENALOG) 0.1 % ointment (Patient Supplied)   Topical BID Julian Johnson MD   Given at 04/22/22 0900    diphenhydrAMINE (BENADRYL) injection 12.5 mg  12.5 mg IntraVENous Q6H PRN Shade Ricci MD   12.5 mg at 04/21/22 2317    acetaminophen (TYLENOL) tablet 650 mg  650 mg Oral Q4H PRN Shade Ricci MD   650 mg at 04/21/22 2317       Past History     Past Medical History:  Past Medical History:   Diagnosis Date    Asthma     Chronic obstructive pulmonary disease ([facility] [address]     Hypertension        Past Surgical History:  No past surgical history on file.     Family History:  History reviewed. No pertinent family history. Social History:  Social History     Tobacco Use    Smoking status: Current Every Day Smoker     Packs/day: 0.50    Smokeless tobacco: Never Used   Substance Use Topics    Alcohol use: Not Currently    Drug use: Never       Allergies: Allergies   Allergen Reactions    Codeine Rash    Demerol [Meperidine] Rash    Spiriva Respimat [Tiotropium Bromide] Other (comments)     Wheezing  ( tolerates other anticholinergics like Itratroprium )    Sulfa (Sulfonamide Antibiotics) Rash         Review of Systems   Review of Systems   Constitutional: Positive for activity change and fatigue. Negative for chills and fever. HENT: Negative for congestion, ear discharge, ear pain, rhinorrhea, sinus pressure, sinus pain, sneezing and sore throat. Respiratory: Positive for cough, chest tightness and shortness of breath. Negative for wheezing. Cardiovascular: Negative for chest pain and palpitations. Gastrointestinal: Negative for abdominal pain, diarrhea, nausea and vomiting. Genitourinary: Negative for decreased urine volume, dysuria, flank pain, frequency, hematuria and urgency. Musculoskeletal: Negative for back pain. Neurological: Negative for dizziness, light-headedness and headaches. Physical Exam     Vitals:    04/22/22 0721 04/22/22 0927 04/22/22 1111 04/22/22 1553   BP:  108/62  105/60   Pulse:  92  79   Resp:  20  20   Temp:  97.3 °F (36.3 °C)  97.4 °F (36.3 °C)   SpO2: 94% 95% 94% 96%   Weight:       Height:         Physical Exam  Vitals and nursing note reviewed. Constitutional:       General: She is not in acute distress. Appearance: She is well-developed, well-groomed and normal weight. She is ill-appearing. She is not diaphoretic. Comments: 70-year-old female. Here with her family. Appears very weak, tired. Somewhat pale. Short of breath but not overly tachypneic.   Signs are currently stable except for oxygen ranging between 90 and 92% on room air. Very loud congested cough heard. HENT:      Nose: Nose normal.      Mouth/Throat:      Mouth: Mucous membranes are moist.      Pharynx: Oropharynx is clear. Eyes:      Extraocular Movements: Extraocular movements intact. Conjunctiva/sclera: Conjunctivae normal.      Pupils: Pupils are equal, round, and reactive to light. Neck:      Vascular: No JVD. Trachea: Trachea normal.   Cardiovascular:      Rate and Rhythm: Normal rate and regular rhythm. Heart sounds: Normal heart sounds. Pulmonary:      Effort: Tachypnea, prolonged expiration and respiratory distress present. No accessory muscle usage or retractions. Breath sounds: Decreased air movement present. Examination of the right-upper field reveals decreased breath sounds. Examination of the left-upper field reveals decreased breath sounds. Examination of the right-middle field reveals decreased breath sounds. Examination of the left-middle field reveals decreased breath sounds. Examination of the right-lower field reveals decreased breath sounds. Examination of the left-lower field reveals decreased breath sounds. Decreased breath sounds and wheezing present. No rhonchi or rales. Comments: Very congested cough heard. Deep cough. Abdominal:      Palpations: Abdomen is soft. Tenderness: There is no abdominal tenderness. Musculoskeletal:         General: No swelling or tenderness. Cervical back: Neck supple. Lymphadenopathy:      Cervical: No cervical adenopathy. Skin:     General: Skin is warm and dry. Neurological:      Mental Status: She is alert and oriented to person, place, and time. Psychiatric:         Behavior: Behavior is cooperative.          Nursing note and vitals reviewed         Diagnostic Study Results     Labs -     Recent Results (from the past 12 hour(s))   GLUCOSE, POC    Collection Time: 04/22/22 12:10 PM   Result Value Ref Range    Glucose (POC) 203 (H) 70 - 110 mg/dL   GLUCOSE, POC    Collection Time: 04/22/22  4:42 PM   Result Value Ref Range    Glucose (POC) 147 (H) 70 - 110 mg/dL       Radiologic Studies   XR CHEST PORT   Final Result      Bilateral mid to lower lung zone parenchymal/interstitial opacities, edema   versus infection. CT Results  (Last 48 hours)    None        CXR Results  (Last 48 hours)               04/21/22 1205  XR CHEST PORT Final result    Impression:      Bilateral mid to lower lung zone parenchymal/interstitial opacities, edema   versus infection. Narrative:  EXAM: XR CHEST PORT       CLINICAL INDICATION/HISTORY: cough, SOB   -Additional: None       COMPARISON: 9/6/2021       TECHNIQUE: Frontal view of the chest       _______________       FINDINGS:       HEART AND MEDIASTINUM: Normal cardiac size and mediastinal contours. LUNGS AND PLEURAL SPACES: Bilateral mid to lower lung zone   parenchymal/interstitial opacities. No large effusion or pneumothorax. BONY THORAX AND SOFT TISSUES: No acute osseous abnormality       _______________                   Medical Decision Making   I am the first provider for this patient. I reviewed the vital signs, available nursing notes, past medical history, past surgical history, family history and social history. Vital Signs-Reviewed the patient's vital signs. Pulse Oximetry Analysis 96% on room air    Records Reviewed: Nursing Notes and Old Medical Records    DDX:  COPD exacerbation  Pneumonia  Cardiac/heart failure      Procedures:  Procedures    ED Course:   Initial assessment performed. The patients presenting problems have been discussed, and they are in agreement with the care plan formulated and outlined with them. I have encouraged them to ask questions as they arise throughout their visit. ED Physician Discussion Note:   CBC shows elevated white blood cell count 13,900. H&H is stable. 87 segs, 6 lymphs. Urinalysis shows trace ketones.   Moderate leukocyte esterase. 36-50 white blood cells. 1+ bacteria. History show low sodium 128, chloride 96. Elevated glucose 112. Renal function BUN and creatinine 19 and 1.41 respectively. GFR 37. Normal liver function tests  Troponin negative    See chest x-ray results -suspect pneumonia    Patient was informed of her results and need to be admitted to the hospital normally to treat infection but also for her acute respiratory failure. She is agreeable to staying. This case was discussed with hospitalist attending Dr. Phoebe Pearson. Agrees to admit patient to the hospital for treatment. Diagnosis and Disposition       Admit to hospital - Dr. Phoebe Pearson, Telemetry    CLINICAL IMPRESSION:  1) pneumonia  2) COPD exacerbation  3) hyponatremia  4) chronic kidney disease stage IIIb  5) UTI    PLAN:  1. Admit - Telemetry    Please note that this dictation was completed with [employer], the computer voice recognition software. Quite often unanticipated grammatical, syntax, homophones, and other interpretive errors are inadvertently transcribed by the computer software. Please disregard these errors. Please excuse any errors that have escaped final proofreading.

## 2022-04-21 NOTE — ED NOTES
TRANSFER - OUT REPORT:    Verbal report given to telemetry nurse(name) on Aida Dalton  being transferred to telemetry(unit) for routine progression of care       Report consisted of patients Situation, Background, Assessment and   Recommendations(SBAR). Information from the following report(s) SBAR was reviewed with the receiving nurse. Lines:   Peripheral IV 04/21/22 Left Antecubital (Active)        Opportunity for questions and clarification was provided.       Patient transported with:   Monitor oxygen at 2 L

## 2022-04-21 NOTE — H&P
History & Physical    Patient: Sandoval Alfredo MRN: [id number]  CSN: [id number]    YOB: 1950  Age: 70 y.o. Sex: female      DOA: 4/21/2022  Primary Care Provider:  Evan Cardoso MD      Assessment/Plan     Patient Active Problem List   Diagnosis Code    Hypertension I10    COPD exacerbation ([facility] [address] J44.1    Hyponatremia E87.1    Cholelithiasis K80.20    Sinus tachycardia R00.0    Pneumonia J18.9    Respiratory failure with hypoxia (HCC) J96.91       Admit to medical floor     COPD exacerbation -  Intravenous steroids. Pulmicort, brovana,   Duo nebs as needed. Empiric antibiotics. Mucolytics. If patient does not respond to the above measures will get ABG and will initiate NPPV.     Hyponatremia -  Check urine sodium  Gentle IVF     HTN -  Continue home meds,   Monitor BP, cozaar on hold  Continue with lopressor and cardizem     Renal insufficiency -  Cozaar on hold  Follow renal function     DVT prophylaxis with heparin    Estimated length of stay : 2-3 days    CC: SOB       HPI:     Sandoval Alfredo is a 70 y.o. female with COPD, hypertension presents to ER with concerns of shortness of breath. Patient reports that her symptoms has been going on for about a month. She has been seen by her family physician and she has received steroids and nebulizer treatments. However she did not improve. She reports since this morning her symptoms started progressively getting worse and she presented to ER. She denies any fever, chills, headache, nausea, vomiting. She does reports that she has been getting weak and tired. She also reports that she gets significantly short of breath on minimal exertion. She quit smoking about a year ago. She was admitted for similar symptoms in September 2021. She had echocardiogram done that showed left ventricular ejection fraction of 65 to 70%, hyperdynamic systolic function due to elevated heart rate.   In ER she was noted to have white count of 13.9, sodium of 128, BUN/creatinine of 19/1. 41. Her NT proBNP and troponin in normal range. Chest x-ray showed bilateral mild to lower lung zone parenchymal/interstitial opacities, edema versus infection. Past Medical History:   Diagnosis Date    Asthma     Chronic obstructive pulmonary disease ([address]     Hypertension        No past surgical history on file. History reviewed. No pertinent family history. Social History     Socioeconomic History    Marital status:    Tobacco Use    Smoking status: Current Every Day Smoker     Packs/day: 0.50    Smokeless tobacco: Never Used   Substance and Sexual Activity    Alcohol use: Not Currently    Drug use: Never       Prior to Admission medications    Medication Sig Start Date End Date Taking? Authorizing Provider   diphenhydrAMINE-acetaminophen (Percogesic Extra Strength) 12.5-500 mg tab Take 2 Tablets by mouth daily. Yes Provider, Historical   albuterol-ipratropium (DUO-NEB) 2.5 mg-0.5 mg/3 ml nebu 3 mL by Nebulization route every six (6) hours as needed for Wheezing. 9/8/21  Yes Evelyn Forbes MD   arformoteroL (BROVANA) 15 mcg/2 mL nebu neb solution 2 mL by Nebulization route two (2) times a day. 9/8/21  Yes Evelyn Forbes MD   budesonide (PULMICORT) 0.5 mg/2 mL nbsp 2 mL by Nebulization route two (2) times a day. 9/8/21  Yes Evelyn Forbes MD   dilTIAZem ER (CARDIZEM CD) 240 mg capsule Take 1 Capsule by mouth daily. 9/9/21  Yes Evelyn Forbes MD   guaiFENesin ER (MUCINEX) 600 mg ER tablet Take 1 Tablet by mouth every twelve (12) hours. 9/8/21  Yes Evelyn Forbes MD   telmisartan (MICARDIS) 80 mg tablet Take 1 Tablet by mouth daily. 9/8/21  Yes Evelyn Forbes MD   diphenhydrAMINE (Benadryl Allergy) 25 mg tablet Take 25 mg by mouth every six (6) hours as needed for Allergies. Yes Provider, Historical   cetirizine (ZyrTEC) 10 mg tablet Take 10 mg by mouth daily.    Yes Provider, Historical   metoprolol tartrate (LOPRESSOR) 50 mg tablet Take 50 mg by mouth two (2) times a day. Yes Provider, Historical   multivitamin (ONE A DAY) tablet Take 1 Tablet by mouth daily. Yes Provider, Historical   aspirin 81 mg chewable tablet Take 1 Tablet by mouth daily. 21   Monroe Schmidt MD   predniSONE (STERAPRED DS) 10 mg dose pack See administration instruction per 10mg dose pack  Patient taking differently: 60 mg, 6 pills on the first day  50 mg, 5 pills on the second day  40 mg, 4 pills on the third day  30 mg, 3 pills on the fourth day  20 mg, 2 pills on the fifth day  10 mg, 1 pill on the sixth day 21   Monroe Schmidt MD       Allergies   Allergen Reactions    Codeine Rash    Demerol [Meperidine] Rash    Sulfa (Sulfonamide Antibiotics) Rash       Review of Systems  Gen: No fever, chills, malaise, weight loss/gain. Heent: No headache, rhinorrhea, epistaxis, ear pain, hearing loss, sinus pain, neck pain/stiffness, sore throat. Heart: No chest pain, palpitations, CUELLAR, pnd, or orthopnea. Resp: decreased breath sounds bilaterally  GI: No nausea, vomiting, diarrhea, constipation, melena or hematochezia. : No urinary obstruction, dysuria or hematuria. Derm: No rash, new skin lesion or pruritis. Musc/skeletal: no bone or joint complains. Vasc: No edema, cyanosis or claudication. Endo: No heat/cold intolerance, no polyuria,polydipsia or polyphagia. Neuro: No unilateral weakness, numbness, tingling. No seizures. Heme: No easy bruising or bleeding. Physical Exam:     Physical Exam:  Visit Vitals  /62   Pulse 72   Temp 98.1 °F (36.7 °C)   Resp 20   Ht 5' 7\" (1.702 m)   Wt 83.9 kg (185 lb)   SpO2 98%   BMI 28.98 kg/m²    O2 Flow Rate (L/min): 2 l/min O2 Device: Nasal cannula    Temp (24hrs), Av.5 °F (36.9 °C), Min:98.1 °F (36.7 °C), Max:98.8 °F (37.1 °C)     07 -  1900  In: 300 [I.V.:300]  Out: 150 [Urine:150]   No intake/output data recorded. General:  Awake, cooperative, no distress. Head:  Normocephalic, without obvious abnormality, atraumatic. Eyes:  Conjunctivae/corneas clear, sclera anicteric, PERRL, EOMs intact. Nose: Nares normal. No drainage or sinus tenderness. Throat: Lips, mucosa, and tongue normal.    Neck: Supple, symmetrical, trachea midline, no adenopathy. Lungs:   Decreased breath sounds bilaterally, exp wheezes. Heart:   S1, S2, no murmur, click, rub or gallop. Abdomen: Soft, non-tender. Bowel sounds normal. No masses,  No organomegaly. Extremities: Extremities normal, atraumatic, no cyanosis or edema. Capillary refill normal.   Pulses: 2+ and symmetric all extremities. Skin: Skin color pink, turgor normal. No rashes or lesions   Neurologic: CNII-XII intact. No focal motor or sensory deficit.        Labs Reviewed:    CMP:   Lab Results   Component Value Date/Time     (L) 04/21/2022 11:12 AM    K 4.5 04/21/2022 11:12 AM    CL 96 (L) 04/21/2022 11:12 AM    CO2 25 04/21/2022 11:12 AM    AGAP 7 04/21/2022 11:12 AM     (H) 04/21/2022 11:12 AM    BUN 19 (H) 04/21/2022 11:12 AM    CREA 1.41 (H) 04/21/2022 11:12 AM    GFRAA 45 (L) 04/21/2022 11:12 AM    GFRNA 37 (L) 04/21/2022 11:12 AM    CA 9.2 04/21/2022 11:12 AM    ALB 3.1 (L) 04/21/2022 11:12 AM    TP 7.6 04/21/2022 11:12 AM    GLOB 4.5 (H) 04/21/2022 11:12 AM    AGRAT 0.7 (L) 04/21/2022 11:12 AM    ALT 21 04/21/2022 11:12 AM     CBC:   Lab Results   Component Value Date/Time    WBC 13.9 (H) 04/21/2022 11:12 AM    HGB 12.0 04/21/2022 11:12 AM    HCT 36.0 04/21/2022 11:12 AM     04/21/2022 11:12 AM     All Cardiac Markers in the last 24 hours: No results found for: CPK, CK, CKMMB, CKMB, RCK3, CKMBT, CKNDX, CKND1, ABDIEL, TROPT, TROIQ, TRES, TROPT, TNIPOC, BNP, BNPP      Procedures/imaging: see electronic medical records for all procedures/Xrays and details which were not copied into this note but were reviewed prior to creation of Plan    Please note that this dictation was completed with Dragon, the computer voice recognition software. Quite often unanticipated grammatical, syntax, homophones, and other interpretive errors are inadvertently transcribed by the computer software. Please disregard these errors. Please excuse any errors that have escaped final proofreading.         CC: Yuliya Burgess MD

## 2022-04-21 NOTE — ED TRIAGE NOTES
Pt C/O COPD acting up. She is \"Short of breath coughing and has a sore throat. \" Pt states \" I have been feeling bad for about a month and nothing is working. \"

## 2022-04-21 NOTE — PROGRESS NOTES
1602  Pt arrived via stretcher from the ED. She cooperated with assessment. NAD. She denies pain or discomfort. 2042 [facility] ER report to SAYRA Ryan.

## 2022-04-22 ENCOUNTER — APPOINTMENT (OUTPATIENT)
Dept: VASCULAR SURGERY | Age: 72
DRG: 193 | End: 2022-04-22
Attending: INTERNAL MEDICINE
Payer: MEDICARE

## 2022-04-22 ENCOUNTER — APPOINTMENT (OUTPATIENT)
Dept: GENERAL RADIOLOGY | Age: 72
DRG: 193 | End: 2022-04-22
Attending: INTERNAL MEDICINE
Payer: MEDICARE

## 2022-04-22 LAB
ANION GAP SERPL CALC-SCNC: 7 MMOL/L (ref 3–18)
ATRIAL RATE: 94 BPM
BACTERIA SPEC CULT: ABNORMAL
BUN SERPL-MCNC: 25 MG/DL (ref 7–18)
BUN/CREAT SERPL: 18 (ref 12–20)
CALCIUM SERPL-MCNC: 8.5 MG/DL (ref 8.5–10.1)
CALCULATED P AXIS, ECG09: 78 DEGREES
CALCULATED R AXIS, ECG10: 60 DEGREES
CALCULATED T AXIS, ECG11: 44 DEGREES
CC UR VC: ABNORMAL
CHLORIDE SERPL-SCNC: 101 MMOL/L (ref 100–111)
CO2 SERPL-SCNC: 22 MMOL/L (ref 21–32)
CREAT SERPL-MCNC: 1.42 MG/DL (ref 0.6–1.3)
DIAGNOSIS, 93000: NORMAL
ERYTHROCYTE [DISTWIDTH] IN BLOOD BY AUTOMATED COUNT: 12.8 % (ref 11.6–14.5)
GLUCOSE BLD STRIP.AUTO-MCNC: 147 MG/DL (ref 70–110)
GLUCOSE BLD STRIP.AUTO-MCNC: 176 MG/DL (ref 70–110)
GLUCOSE BLD STRIP.AUTO-MCNC: 189 MG/DL (ref 70–110)
GLUCOSE BLD STRIP.AUTO-MCNC: 203 MG/DL (ref 70–110)
GLUCOSE SERPL-MCNC: 166 MG/DL (ref 74–99)
HCT VFR BLD AUTO: 30.7 % (ref 35–45)
HGB BLD-MCNC: 10.2 G/DL (ref 12–16)
MCH RBC QN AUTO: 30.1 PG (ref 24–34)
MCHC RBC AUTO-ENTMCNC: 33.2 G/DL (ref 31–37)
MCV RBC AUTO: 90.6 FL (ref 78–100)
NRBC # BLD: 0 K/UL (ref 0–0.01)
NRBC BLD-RTO: 0 PER 100 WBC
P-R INTERVAL, ECG05: 116 MS
PLATELET # BLD AUTO: 241 K/UL (ref 135–420)
PMV BLD AUTO: 10.3 FL (ref 9.2–11.8)
POTASSIUM SERPL-SCNC: 4.6 MMOL/L (ref 3.5–5.5)
Q-T INTERVAL, ECG07: 364 MS
QRS DURATION, ECG06: 122 MS
QTC CALCULATION (BEZET), ECG08: 455 MS
RBC # BLD AUTO: 3.39 M/UL (ref 4.2–5.3)
SERVICE CMNT-IMP: ABNORMAL
SODIUM SERPL-SCNC: 130 MMOL/L (ref 136–145)
VENTRICULAR RATE, ECG03: 94 BPM
WBC # BLD AUTO: 15.7 K/UL (ref 4.6–13.2)

## 2022-04-22 PROCEDURE — 65270000046 HC RM TELEMETRY

## 2022-04-22 PROCEDURE — 77010033678 HC OXYGEN DAILY

## 2022-04-22 PROCEDURE — 74011000258 HC RX REV CODE- 258: Performed by: PHYSICIAN ASSISTANT

## 2022-04-22 PROCEDURE — 74011250637 HC RX REV CODE- 250/637: Performed by: INTERNAL MEDICINE

## 2022-04-22 PROCEDURE — 94760 N-INVAS EAR/PLS OXIMETRY 1: CPT

## 2022-04-22 PROCEDURE — 74011250636 HC RX REV CODE- 250/636: Performed by: HOSPITALIST

## 2022-04-22 PROCEDURE — 80048 BASIC METABOLIC PNL TOTAL CA: CPT

## 2022-04-22 PROCEDURE — 74011636637 HC RX REV CODE- 636/637: Performed by: HOSPITALIST

## 2022-04-22 PROCEDURE — 36415 COLL VENOUS BLD VENIPUNCTURE: CPT

## 2022-04-22 PROCEDURE — 94640 AIRWAY INHALATION TREATMENT: CPT

## 2022-04-22 PROCEDURE — 74011000250 HC RX REV CODE- 250: Performed by: HOSPITALIST

## 2022-04-22 PROCEDURE — 85027 COMPLETE CBC AUTOMATED: CPT

## 2022-04-22 PROCEDURE — 92526 ORAL FUNCTION THERAPY: CPT

## 2022-04-22 PROCEDURE — 82962 GLUCOSE BLOOD TEST: CPT

## 2022-04-22 PROCEDURE — 96376 TX/PRO/DX INJ SAME DRUG ADON: CPT

## 2022-04-22 PROCEDURE — 74011250637 HC RX REV CODE- 250/637: Performed by: HOSPITALIST

## 2022-04-22 PROCEDURE — 92610 EVALUATE SWALLOWING FUNCTION: CPT

## 2022-04-22 PROCEDURE — 74011250636 HC RX REV CODE- 250/636: Performed by: PHYSICIAN ASSISTANT

## 2022-04-22 PROCEDURE — G0378 HOSPITAL OBSERVATION PER HR: HCPCS

## 2022-04-22 RX ORDER — LOPERAMIDE HYDROCHLORIDE 2 MG/1
4 CAPSULE ORAL
Status: DISCONTINUED | OUTPATIENT
Start: 2022-04-22 | End: 2022-04-24 | Stop reason: HOSPADM

## 2022-04-22 RX ADMIN — METHYLPREDNISOLONE SODIUM SUCCINATE 40 MG: 40 INJECTION, POWDER, FOR SOLUTION INTRAMUSCULAR; INTRAVENOUS at 19:00

## 2022-04-22 RX ADMIN — IPRATROPIUM BROMIDE AND ALBUTEROL SULFATE 3 ML: .5; 3 SOLUTION RESPIRATORY (INHALATION) at 04:50

## 2022-04-22 RX ADMIN — AZITHROMYCIN MONOHYDRATE 500 MG: 500 INJECTION, POWDER, LYOPHILIZED, FOR SOLUTION INTRAVENOUS at 12:46

## 2022-04-22 RX ADMIN — PANTOPRAZOLE SODIUM 40 MG: 40 TABLET, DELAYED RELEASE ORAL at 18:02

## 2022-04-22 RX ADMIN — TRIAMCINOLONE ACETONIDE: 1 OINTMENT TOPICAL at 09:00

## 2022-04-22 RX ADMIN — IPRATROPIUM BROMIDE AND ALBUTEROL SULFATE 3 ML: .5; 3 SOLUTION RESPIRATORY (INHALATION) at 23:58

## 2022-04-22 RX ADMIN — GUAIFENESIN 600 MG: 600 TABLET, EXTENDED RELEASE ORAL at 09:31

## 2022-04-22 RX ADMIN — BUDESONIDE 500 MCG: 0.5 INHALANT RESPIRATORY (INHALATION) at 20:49

## 2022-04-22 RX ADMIN — GUAIFENESIN 600 MG: 600 TABLET, EXTENDED RELEASE ORAL at 22:34

## 2022-04-22 RX ADMIN — IPRATROPIUM BROMIDE AND ALBUTEROL SULFATE 3 ML: .5; 3 SOLUTION RESPIRATORY (INHALATION) at 20:50

## 2022-04-22 RX ADMIN — METHYLPREDNISOLONE SODIUM SUCCINATE 40 MG: 40 INJECTION, POWDER, FOR SOLUTION INTRAMUSCULAR; INTRAVENOUS at 04:58

## 2022-04-22 RX ADMIN — IPRATROPIUM BROMIDE AND ALBUTEROL SULFATE 3 ML: .5; 3 SOLUTION RESPIRATORY (INHALATION) at 07:21

## 2022-04-22 RX ADMIN — METHYLPREDNISOLONE SODIUM SUCCINATE 40 MG: 40 INJECTION, POWDER, FOR SOLUTION INTRAMUSCULAR; INTRAVENOUS at 12:46

## 2022-04-22 RX ADMIN — PANTOPRAZOLE SODIUM 40 MG: 40 TABLET, DELAYED RELEASE ORAL at 06:43

## 2022-04-22 RX ADMIN — Medication 4 UNITS: at 11:30

## 2022-04-22 RX ADMIN — Medication 2 UNITS: at 22:35

## 2022-04-22 RX ADMIN — HEPARIN SODIUM 5000 UNITS: 5000 INJECTION INTRAVENOUS; SUBCUTANEOUS at 18:00

## 2022-04-22 RX ADMIN — LOPERAMIDE HYDROCHLORIDE 4 MG: 2 CAPSULE ORAL at 22:34

## 2022-04-22 RX ADMIN — HEPARIN SODIUM 5000 UNITS: 5000 INJECTION INTRAVENOUS; SUBCUTANEOUS at 04:58

## 2022-04-22 RX ADMIN — DILTIAZEM HYDROCHLORIDE 240 MG: 240 CAPSULE, COATED, EXTENDED RELEASE ORAL at 09:31

## 2022-04-22 RX ADMIN — CEFTRIAXONE SODIUM 2 G: 2 INJECTION, POWDER, FOR SOLUTION INTRAMUSCULAR; INTRAVENOUS at 12:45

## 2022-04-22 RX ADMIN — ASPIRIN 81 MG: 81 TABLET, CHEWABLE ORAL at 09:31

## 2022-04-22 RX ADMIN — ARFORMOTEROL TARTRATE 15 MCG: 15 SOLUTION RESPIRATORY (INHALATION) at 07:21

## 2022-04-22 RX ADMIN — IPRATROPIUM BROMIDE AND ALBUTEROL SULFATE 3 ML: .5; 3 SOLUTION RESPIRATORY (INHALATION) at 11:10

## 2022-04-22 RX ADMIN — ARFORMOTEROL TARTRATE 15 MCG: 15 SOLUTION RESPIRATORY (INHALATION) at 20:49

## 2022-04-22 RX ADMIN — BUDESONIDE 500 MCG: 0.5 INHALANT RESPIRATORY (INHALATION) at 07:21

## 2022-04-22 RX ADMIN — TRIAMCINOLONE ACETONIDE: 1 OINTMENT TOPICAL at 22:35

## 2022-04-22 RX ADMIN — HEPARIN SODIUM 5000 UNITS: 5000 INJECTION INTRAVENOUS; SUBCUTANEOUS at 10:00

## 2022-04-22 RX ADMIN — METOPROLOL TARTRATE 50 MG: 50 TABLET ORAL at 22:34

## 2022-04-22 RX ADMIN — ACETAMINOPHEN 650 MG: 325 TABLET ORAL at 22:34

## 2022-04-22 RX ADMIN — CETIRIZINE HYDROCHLORIDE 10 MG: 10 TABLET, FILM COATED ORAL at 09:31

## 2022-04-22 RX ADMIN — SODIUM CHLORIDE 75 ML/HR: 9 INJECTION, SOLUTION INTRAVENOUS at 06:43

## 2022-04-22 NOTE — PROGRESS NOTES
Physician Progress Note      Elisha Maria  CSN #:                  [id number]  :                       1950  ADMIT DATE:       2022 11:01 AM  DISCH DATE:  RESPONDING  PROVIDER #:        Prem CORDOVA MD          QUERY TEXT:    Patient admitted with COPD exacerbation  . Noted documentation of  pneumonia  as an active hospital problem in Hospitalist H&P progress notes on . ? Please document in progress notes the clinical indicators to support this diagnosis on current admission or document if this diagnosis pneumonia is PMH only or has been ruled out after study. ? Please update active hospital problems appropriately to reflect response. The medical record reflects the following:  Risk Factors: COPD exacerbation  Clinical Indicators: WBC 13.9 , 15.7 with left shift. CXR: Bilateral mid to lower lung zone parenchymal/interstitial opacities, edema  versus infection. Treatment: Zithromax, rocephin, gentamicin    Thank You  Xin Gayle RN CDI CRCR  Options provided:  -- Pneumonia is currently being treated/evaluated  -- Pneumonia is currently being treated/evaluated as evidenced by, Please document clinical support. -- Pneumonia has been ruled out after study  -- Pneumonia  is PMH only  -- Other - I will add my own diagnosis  -- Disagree - Not applicable / Not valid  -- Disagree - Clinically unable to determine / Unknown  -- Refer to Clinical Documentation Reviewer    PROVIDER RESPONSE TEXT:    Pneumonia is currently being treated/evaluated.     Query created by: Paloma Nichole on 2022 10:55 AM      Electronically signed by:  Prem Cruz MD 2022 3:39 PM

## 2022-04-22 NOTE — ACP (ADVANCE CARE PLANNING)
Advance Care Planning   Advance Care Planning Inpatient Note  [facility] Department    Today's Date: 4/22/2022  Unit: THE [address] CARDIAC/MEDICAL    Received request from ruth. Upon review of chart and communication with care team, patient's decision making abilities are not in question. Patient was/were present in the room during visit.     Goals of ACP Conversation:  Discuss Advance Care planning documents    Health Care Decision Makers:      Primary Decision Maker: Dru Meckel [facility] - [phone]      Summary:  No Decision Maker named by patient at this time    [address] (Patient Wishes) on file:  Healthcare Power of /Advance Directive appointment of Health care agent     Interventions:  Requested patient/family submit existing document(s) for our records: [address] of /Advance Directive appointment of Health care agent    Care Preferences Communicated:  No    Outcomes/Plan:  ACP Discussion Completed    Tesfaye Franklin on 4/22/2022 at 11:13 AM

## 2022-04-22 NOTE — PROGRESS NOTES
Problem: Falls - Risk of  Goal: *Absence of Falls  Description: Document Dorian Johnson Fall Risk and appropriate interventions in the flowsheet.   Outcome: Progressing Towards Goal  Note: Fall Risk Interventions:  Mobility Interventions: Bed/chair exit alarm,Communicate number of staff needed for ambulation/transfer,Patient to call before getting OOB,Strengthening exercises (ROM-active/passive)         Medication Interventions: Teach patient to arise slowly    Elimination Interventions: Call light in reach,Patient to call for help with toileting needs,Stay With Me (per policy)              Problem: Patient Education: Go to Patient Education Activity  Goal: Patient/Family Education  Outcome: Progressing Towards Goal

## 2022-04-22 NOTE — ACP (ADVANCE CARE PLANNING)
Advance Care Planning     General Advance Care Planning (ACP) Conversation      Date of Conversation: 4/22/22  Conducted with: Patient    Healthcare Decision Maker:     Primary Decision Maker: Reynaldo Luna [facility] - [phone]  Click here to complete [place] including selection of the Healthcare Decision Maker Relationship (ie \"Primary\")      Anibal Briones RN

## 2022-04-22 NOTE — PROGRESS NOTES
D/c plan: Home vs home w/ HH. Please consider therapy evals to assist w/ d/c planning. Pt's family to transport. Discussed the pt w/ Dr. Rock Borjas during rounds. Pt admitted for COPD exacerbation. Anticipate d/c next week. Pt lives at home w/ her son and her dtr-in-law. Her dtr-in-law is home all day w/ the pt. The pt's son transports her to her appts as the pt and her dtr-in-law do not drive. The pt is independent in all ADLs. She utilizes a RW at baseline. The pt doesn't have O2 at baseline. Reason for Admission:  COPD exacerbation. RUR Score:         13%            Plan for utilizing home health:    TBD. PCP: First and Last name:  Debbi Parish MD     Name of Practice:    Are you a current patient: Yes/No:    Approximate date of last visit:    Can you participate in a virtual visit with your PCP:                     Current Advanced Directive/Advance Care Plan: Full Code      Healthcare Decision Maker:   Click here to complete [place] including selection of the Healthcare Decision Maker Relationship (ie \"Primary\")             Primary Decision Maker: Azam Katie  [phone]                  Transition of Care Plan:    TBD. Anticipate d/c home vs home w/ HH pending pt's progression and possible therapy evals. Care Management Interventions  PCP Verified by CM: Yes Kelsea Marcano, NP )  Palliative Care Criteria Met (RRAT>21 & CHF Dx)?: No  Mode of Transport at Discharge: Other (see comment) (Family to transport. )  Transition of Care Consult (CM Consult): Other (Home vs home w/ [facility] pending therapy evals.)  Discharge Durable Medical Equipment:  (TBD.  Pt has a RW at home. )  Physical Therapy Consult:  (When medically appropriate)  Occupational Therapy Consult:  (When medically appropriate )  Speech Therapy Consult: Yes  Support Systems: Child(santa) (The pt lives at home w/ her son and her dtr-in-law. )  Confirm Follow Up Transport: Family  The Plan for Transition of Care is Related to the Following Treatment Goals : Home vs home w/ [place] pending therapy evals.   The Patient and/or Patient Representative was Provided with a Choice of Provider and Agrees with the Discharge Plan?: Yes (The pt is alert and oriented and is in agreement w/ the d/c plan.)  Freedom of Choice List was Provided with Basic Dialogue that Supports the Patient's Individualized Plan of Care/Goals, Treatment Preferences and Shares the Quality Data Associated with the Providers?:  (To be offered if [place] is ordered at d/c. )  Discharge Location  Patient Expects to be Discharged to[de-identified] Home

## 2022-04-22 NOTE — PROGRESS NOTES
Allergy addition:  As requested by Dr. Hermilo Frost as an allergy ( wheezing )  ( tolerates other anticholinergics like itratroprium )

## 2022-04-22 NOTE — PROGRESS NOTES
conducted an initial consultation and Spiritual Assessment for THE [facility], who is a 70 y.o.,female. Patients Primary Language is: [place]. According to the patients EMR Spiritism Affiliation is: Catholic.     The reason the Patient came to the hospital is:   Patient Active Problem List    Diagnosis Date Noted    Pneumonia 04/21/2022    Respiratory failure with hypoxia ([facility] [address] 04/21/2022    Sinus tachycardia 09/07/2021    Hyponatremia 09/02/2021    Cholelithiasis 09/02/2021    Hypertension     COPD exacerbation ([facility] [address]         The  provided the following Interventions:  Initiated a relationship of care and support. Explored issues of nathaniel, belief, spirituality and Tenriism/ritual needs while hospitalized. Listened empathically. Provided information about Spiritual Care Services. Offered assurance of prayer on patient's behalf. Chart reviewed. The following outcomes where achieved:  Patient shared limited information about both their medical narrative and spiritual journey/beliefs.  confirmed Patient's Spiritism Affiliation. Patient processed feeling about current hospitalization. Patient expressed gratitude for 's visit. Assessment:  Patient does not have any Tenriism/cultural needs that will affect patients preferences in health care. Plan:  Chaplains will continue to follow and will provide pastoral care on an as needed/requested basis.  recommends bedside caregivers page  on duty if patient shows signs of acute spiritual or emotional distress. Rev.  Nadia Thornton, Certified Respecting [address] [place] [address]  [phone]

## 2022-04-22 NOTE — PROGRESS NOTES
Hospitalist Progress Note-critical care note     Patient: Christo Tai MRN: [id number]  CSN: [id number]    YOB: 1950  Age: 70 y.o. Sex: female    DOA: 4/21/2022 LOS:  LOS: 1 day            Chief complaint: hypoxia, copd exac , pna , htn     Assessment/Plan         Hospital Problems  Never Reviewed          Codes Class Noted POA    Pneumonia ICD-10-CM: J18.9  ICD-9-CM: 486  4/21/2022 Unknown        Respiratory failure with hypoxia ([facility] [address] ICD-10-CM: J96.91  ICD-9-CM: 518.81  4/21/2022 Unknown        Hypertension ICD-10-CM: I10  ICD-9-CM: 401.9  Unknown Yes        * (Principal) COPD exacerbation ([facility] [address] ICD-10-CM: J44.1  ICD-9-CM: 491.21  Unknown Unknown        Hyponatremia ICD-10-CM: E87.1  ICD-9-CM: 276.1  9/2/2021 Yes                COPD exacerbation  with hypoxia   Reported sob is better. Continue steroids. Pulmicort, brovana,   Duo nebs as needed. Empiric antibiotics. Mucolytics. She f/u with dr. Carissa Salgado as out pt and will let her on board   Weaning oxygen as tolerated   Flu and /rapid covid 19 negative     pna on rocephin and azithromycin   Will have speech evaluation-r/o aspiration   Aspiration precaution   Sent strep pneumo. legi      Hyponatremia -  Gentle IVF, ns 130 improving      HTN -  Continue home meds,   Monitor BP, cozaar on hold  Continue with lopressor and cardizem      Renal insufficiency -  Cozaar on hold  Cr mild improving   Follow renal function     Subjective: sob is better, I saw dr. Carissa Salgado as out-pt       Disposition :tbd,   Review of systems:    General: No fevers or chills. Cardiovascular: No chest pain or pressure. No palpitations. Pulmonary: + shortness of breath. +mild wheezing   Gastrointestinal: No nausea, vomiting. Vital signs/Intake and Output:  Visit Vitals  /62   Pulse 92   Temp 97.3 °F (36.3 °C)   Resp 20   Ht 5' 7\" (1.702 m)   Wt 83.9 kg (185 lb)   SpO2 94%   BMI 28.98 kg/m²     Current Shift:  No intake/output data recorded.   Last three shifts:  04/20 1901 - 04/22 0700  In: 540 [P.O.:240; I.V.:300]  Out: 650 [Urine:650]    Physical Exam:  General: WD, WN. Alert, cooperative, no acute distress    HEENT: NC, Atraumatic. PERRLA, anicteric sclerae. Lungs: Mild wheezing   Heart:  Regular  rhythm,  No murmur, No Rubs, No Gallops  Abdomen: Soft, Non distended, Non tender. +Bowel sounds,   Extremities: No c/c/e  Psych:   Not anxious or agitated. Neurologic:  No acute neurological deficit. Labs: Results:       Chemistry Recent Labs     04/22/22  0241 04/21/22  1112   * 112*   * 128*   K 4.6 4.5    96*   CO2 22 25   BUN 25* 19*   CREA 1.42* 1.41*   CA 8.5 9.2   AGAP 7 7   BUCR 18 13   AP  --  93   TP  --  7.6   ALB  --  3.1*   GLOB  --  4.5*   AGRAT  --  0.7*      CBC w/Diff Recent Labs     04/22/22  0241 04/21/22  1112   WBC 15.7* 13.9*   RBC 3.39* 3.97*   HGB 10.2* 12.0   HCT 30.7* 36.0    254   GRANS  --  87*   LYMPH  --  6*   EOS  --  2      Cardiac Enzymes No results for input(s): CPK, CKND1, ABDIEL in the last 72 hours. No lab exists for component: CKRMB, TROIP   Coagulation No results for input(s): PTP, INR, APTT, INREXT, INREXT in the last 72 hours. Lipid Panel No results found for: CHOL, CHOLPOCT, CHOLX, CHLST, CHOLV, 917426, HDL, HDLP, LDL, LDLC, DLDLP, 719561, VLDLC, VLDL, TGLX, TRIGL, TRIGP, TGLPOCT, CHHD, CHHDX   BNP No results for input(s): BNPP in the last 72 hours.    Liver Enzymes Recent Labs     04/21/22  1112   TP 7.6   ALB 3.1*   AP 93      Thyroid Studies No results found for: T4, T3U, TSH, TSHEXT, TSHEXT     Procedures/imaging: see electronic medical records for all procedures/Xrays and details which were not copied into this note but were reviewed prior to creation of Plan    XR CHEST PORT    Result Date: 4/21/2022  EXAM: XR CHEST PORT CLINICAL INDICATION/HISTORY: cough, SOB -Additional: None COMPARISON: 9/6/2021 TECHNIQUE: Frontal view of the chest _______________ FINDINGS: HEART AND MEDIASTINUM: Normal cardiac size and mediastinal contours. LUNGS AND PLEURAL SPACES: Bilateral mid to lower lung zone parenchymal/interstitial opacities. No large effusion or pneumothorax. BONY THORAX AND SOFT TISSUES: No acute osseous abnormality _______________     Bilateral mid to lower lung zone parenchymal/interstitial opacities, edema versus infection.       Emma Carlisle MD

## 2022-04-22 NOTE — CONSULTS
[facility]ists  Pulmonary, Critical Care, and Sleep Medicine    Name: Evangelina Yu MRN: [id number]   : 1950 Hospital: [facility] [place]    Date: 2022  Room: [facility]/[address] [place] [address] Note                                              Consult requesting physician: Dr. Filippo Terrazas    Reason for Consult: Acute hypoxemic respiratory failure, COPD exacerbation,      IMPRESSION:   · Acute hypoxemic respiratory failure  · Copd exacerbation  · Hyperglycemia  · Hyponatremia   · GUERRERO  Patient Active Problem List   Diagnosis Code    Hypertension I10    COPD exacerbation ([facility] [address] J44.1    Hyponatremia E87.1    Cholelithiasis K80.20    Sinus tachycardia R00.0    Pneumonia J18.9    Respiratory failure with hypoxia ([facility] [address] J96.91 ·           · Code status: Full code       RECOMMENDATIONS:   Respiratory: Acute hypoxemic respiratory failure currently on nasal cannula ABG indicates [phone]  Due to acute COPD exacerbation. Chest x-ray personally reviewed mild interstitial changes BNP not significantly elevated and troponin negative. Cannot exclude thromboembolic disease however less likely last September we did the VQ scan was negative. Offered patient VQ scan PVL or CTA but she does not want on to have more tests at this point. Continue treatment for COPD exacerbation. Patient is at home on Brovana and Pulmicort with good compliance she also has DuoNeb. Recently was started on Spiriva. To have reaction to Spiriva but is tolerating well other on the cholinergic including ipratropium and Yuperli  Continue steroids. Agree with antibiotics. I wanted to repeat chest x-ray PA and lateral at this point since we do not have a CAT scan but patient declined today we will discuss that again tomorrow. Patient had thromboembolic disease but many years ago. Keep SPO2 >=92%. HOB 30 degree elevation all the time. Aggressive pulmonary toileting. Aspiration precautions. Incentive spirometry.   CVS: Pretension on metoprolol and ARB at home. Troponin negative. Last echo was performed in September. Hypertensive heart. Could have diastolic heart failure but BNP is not significantly elevated avoid fluid overload. At home on diuretics dose not clear. ID: WBC cells elevated possible urinary tract infection and COPD exacerbation agree with antibiotics. Recommend sputum cultures patient agreed to do if able to produce  Sepsis bundle and protocol followed. Follow serial lactic acid, frequent BMP check, fluid resuscitation. Follow cultures. Deescalate antibiotic when appropriate. Hematology/Oncology: White blood cells elevated most likely due to infection additional contributor steroid  Renal: Renal insufficiency Baseline creatinine about 1.2. Worsening on this admission. GI/: UA positive continue antibiotics if able to tolerate remove Gregory catheter  Endocrine: Monitor BS. SSI. Glycemia on insulin  Neurology: Wake and alert    Pain/Sedation: pRN  Skin/Wound: None  Electrolytes: Replace electrolytes per  electrolyte replacement protocol. IVF: got hydration overnight I would stop IV fluids  Nutrition: cardiac  Prophylaxis: DVT Prophylaxis: SCD/yes sq heparin GI Prophylaxis: Protonix/   Restraints: none    Lines/Tubes: PIV  Other:  PT/OT eval and treat. OOB. Advance Directive/Palliative Care: consulted    Will defer respective systems problem management to primary and other respective consultant and follow patient in ICU with primary and other medical team.  Further recommendations will be based on the patient's response to recommended treatment and results of the investigation ordered. Quality Care: PPI, DVT prophylaxis, HOB elevated, Infection control all reviewed and addressed.     Care of plan d/w hospitalist team, RN, RT, MDR.  D/w patient and daughter in law     High complexity decision making was performed during the evaluation of this patient at high risk for decompensation with multiple organ involvement. .         Subjective/History of Present Illness:     Patient is a 70 y.o. female with PMHx significant for moderate COPD, ex smoker, lung nodules, hypertensive heart, increased BMI chronic renal insufficiency baseline creatinine around 1.2. At home compliant with COPD medications Brovana and Pulmicort and DuoNeb. He recently had a trial of Spiriva but developed side effects. Switch to other anticholinergic with good tolerance Yuperli. Patient has been unwell for about 2 to 4 weeks, last 2 weeks wheezing cough shortness of breath chest tightness. Mostly dry cough. No fever. On admission to emergency room received steroids bronchodilators with some improvement. Found to have significant hypoxemia. Previous admission had a VQ scan and PVL in September 2021. PO2 59 on ABG. Evaluated in the room 355    4/22/2022:  Evaluated in room 355 currently on nasal cannula. Some improvement overnight after steroids and bronchodilators. 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 She did had negative VQ scan in September. Continue treatment. We will see as needed. Recommend to continue at home triple therapy with brovana, pulmicort and Yuperli  Unable to tolerate Trelegy and spirirva        I/O last 24 hrs: Intake/Output Summary (Last 24 hours) at 4/22/2022 1617  Last data filed at 4/22/2022 0050  Gross per 24 hour   Intake 240 ml   Output 500 ml   Net -260 ml             History taken from patient and EMR    Review of Systems:  A comprehensive review of systems was negative except for that written in the HPI.        Review of Systems:   HEENT: No epistaxis, no nasal drainage, no difficulty in swallowing, no redness in eyes  Respiratory: as above  Cardiovascular: no chest pain, no palpitations, no chronic leg edema, no syncope  Gastrointestinal: no abd pain, no vomiting, no diarrhea, no bleeding symptoms  Genitourinary: No urinary symptoms or hematuria  Musculoskeletal: Neg  Neurological: No focal weakness, no seizures, no headaches, restless legs   Behvioral/Psych: No anxiety, no depression  General : No fever, no chills, no weight loss, no night sweats     Allergies   Allergen Reactions    Codeine Rash    Demerol [Meperidine] Rash    Spiriva Respimat [Tiotropium Bromide] Other (comments)     Wheezing  ( tolerates other anticholinergics like Itratroprium )    Sulfa (Sulfonamide Antibiotics) Rash      Past Medical History:   Diagnosis Date    Asthma     Chronic obstructive pulmonary disease ([facility] [address]     Hypertension       No past surgical history on file. Social History     Tobacco Use    Smoking status: Current Every Day Smoker     Packs/day: 0.50    Smokeless tobacco: Never Used   Substance Use Topics    Alcohol use: Not Currently      History reviewed. No pertinent family history. Prior to Admission medications    Medication Sig Start Date End Date Taking? Authorizing Provider   diphenhydrAMINE-acetaminophen (Percogesic Extra Strength) 12.5-500 mg tab Take 2 Tablets by mouth daily. Yes Provider, Historical   albuterol-ipratropium (DUO-NEB) 2.5 mg-0.5 mg/3 ml nebu 3 mL by Nebulization route every six (6) hours as needed for Wheezing. 9/8/21  Yes Jocelyn Perdue MD   arformoteroL (BROVANA) 15 mcg/2 mL nebu neb solution 2 mL by Nebulization route two (2) times a day. 9/8/21  Yes Jocelyn Perdue MD   budesonide (PULMICORT) 0.5 mg/2 mL nbsp 2 mL by Nebulization route two (2) times a day. 9/8/21  Yes Jocelyn Perdue MD   dilTIAZem ER (CARDIZEM CD) 240 mg capsule Take 1 Capsule by mouth daily. 9/9/21  Yes Jocelyn Perdue MD   guaiFENesin ER (MUCINEX) 600 mg ER tablet Take 1 Tablet by mouth every twelve (12) hours. 9/8/21  Yes Jocelyn Perdue MD   telmisartan (MICARDIS) 80 mg tablet Take 1 Tablet by mouth daily.  9/8/21  Yes Jocelyn Perdue MD diphenhydrAMINE (Benadryl Allergy) 25 mg tablet Take 25 mg by mouth every six (6) hours as needed for Allergies. Yes Provider, Historical   cetirizine (ZyrTEC) 10 mg tablet Take 10 mg by mouth daily. Yes Provider, Historical   metoprolol tartrate (LOPRESSOR) 50 mg tablet Take 50 mg by mouth two (2) times a day. Yes Provider, Historical   multivitamin (ONE A DAY) tablet Take 1 Tablet by mouth daily. Yes Provider, Historical   aspirin 81 mg chewable tablet Take 1 Tablet by mouth daily.  9/9/21   Adriane Dumont MD   predniSONE (STERAPRED DS) 10 mg dose pack See administration instruction per 10mg dose pack  Patient taking differently: 60 mg, 6 pills on the first day  50 mg, 5 pills on the second day  40 mg, 4 pills on the third day  30 mg, 3 pills on the fourth day  20 mg, 2 pills on the fifth day  10 mg, 1 pill on the sixth day 9/8/21   Adriane Dumont MD     Current Facility-Administered Medications   Medication Dose Route Frequency    cefTRIAXone (ROCEPHIN) 2 g in 0.9% sodium chloride (MBP/ADV) 50 mL MBP  2 g IntraVENous Q24H    azithromycin (ZITHROMAX) 500 mg in 0.9% sodium chloride 250 mL (VIAL-MATE)  500 mg IntraVENous Q24H    albuterol-ipratropium (DUO-NEB) 2.5 MG-0.5 MG/3 ML  3 mL Nebulization Q4H RT    aspirin chewable tablet 81 mg  81 mg Oral DAILY    cetirizine (ZYRTEC) tablet 10 mg  10 mg Oral DAILY    dilTIAZem ER (CARDIZEM CD) capsule 240 mg  240 mg Oral DAILY    guaiFENesin ER (MUCINEX) tablet 600 mg  600 mg Oral Q12H    metoprolol tartrate (LOPRESSOR) tablet 50 mg  50 mg Oral BID    insulin lispro (HUMALOG) injection   SubCUTAneous AC&HS    methylPREDNISolone (PF) (SOLU-MEDROL) injection 40 mg  40 mg IntraVENous Q8H    heparin (porcine) injection 5,000 Units  5,000 Units SubCUTAneous Q8H    pantoprazole (PROTONIX) tablet 40 mg  40 mg Oral ACB&D    0.9% sodium chloride infusion  75 mL/hr IntraVENous CONTINUOUS    arformoteroL (BROVANA) neb solution 15 mcg  15 mcg Nebulization BID RT    budesonide (PULMICORT) 500 mcg/2 ml nebulizer suspension  500 mcg Nebulization BID RT    triamcinolone acetonide (KENALOG) 0.1 % ointment (Patient Supplied)   Topical BID         Objective:   Vital Signs:    Visit Vitals  /60   Pulse 79   Temp 97.4 °F (36.3 °C)   Resp 20   Ht 5' 7\" (1.702 m)   Wt 83.9 kg (185 lb)   SpO2 96%   BMI 28.98 kg/m²       O2 Device: Nasal cannula   O2 Flow Rate (L/min): 2 l/min   Temp (24hrs), Av.6 °F (36.4 °C), Min:97.3 °F (36.3 °C), Max:97.9 °F (36.6 °C)       Intake/Output:   Last shift:      No intake/output data recorded. Last 3 shifts:  1901 -  0700  In: 540 [P.O.:240; I.V.:300]  Out: 650 [Urine:650]      Intake/Output Summary (Last 24 hours) at 2022 1617  Last data filed at 2022 0050  Gross per 24 hour   Intake 240 ml   Output 500 ml   Net -260 ml       Last 3 Recorded Weights in this Encounter    22 1058   Weight: 83.9 kg (185 lb)             Recent Labs     22  1128   PHI 7.45   PCO2I 30.7*   PO2I 59*   HCO3I 21.5*   FIO2I 21       Physical Exam:     Impresson general : Alert, Awake, in mild respiratory distress, obese  Head:   Normocephalic,atraumatic. ENT:   EOM  no scleral icterus, no pallor, no cyanosis. Nose:   No sinus tenderness  Throat:  Oropharynx ,mucosa, and tongue normal. No oral thrush. Neck:   Supple, symmetric. Lymph nodes. Trachea is midline   Lung: Moderate air entry bilateral equal  olga lidia  rales. No rhonchi. olga lidia wheezing. No stridors. No prolongded expiration. No accessory muscle use. Heart:   Regular  rate & rhythm. S1 S2 present. No murmur. No JVD. Abdomen:  Soft. NT.obese  ND. +BS. No masses. liver  and spleen  Extremities:  No pedal edema. No cyanosis. No clubbing. Pulses: 2+ and symmetric in DP. Capillary refill: normal  Lymphatic:  neck and supraclavicular    Musculoskeletal: No joint swelling. No tenderness. Skin:   Lesion Color, texture, turgor normal. No rashes or lesions.        Data: Recent Results (from the past 24 hour(s))   GLUCOSE, POC    Collection Time: 04/21/22  9:08 PM   Result Value Ref Range    Glucose (POC) 189 (H) 70 - 073 mg/dL   METABOLIC PANEL, BASIC    Collection Time: 04/22/22  2:41 AM   Result Value Ref Range    Sodium 130 (L) 136 - 145 mmol/L    Potassium 4.6 3.5 - 5.5 mmol/L    Chloride 101 100 - 111 mmol/L    CO2 22 21 - 32 mmol/L    Anion gap 7 3.0 - 18 mmol/L    Glucose 166 (H) 74 - 99 mg/dL    BUN 25 (H) 7.0 - 18 MG/DL    Creatinine 1.42 (H) 0.6 - 1.3 MG/DL    BUN/Creatinine ratio 18 12 - 20      GFR est AA 44 (L) >60 ml/min/1.73m2    GFR est non-AA 36 (L) >60 ml/min/1.73m2    Calcium 8.5 8.5 - 10.1 MG/DL   CBC W/O DIFF    Collection Time: 04/22/22  2:41 AM   Result Value Ref Range    WBC 15.7 (H) 4.6 - 13.2 K/uL    RBC 3.39 (L) 4.20 - 5.30 M/uL    HGB 10.2 (L) 12.0 - 16.0 g/dL    HCT 30.7 (L) 35.0 - 45.0 %    MCV 90.6 78.0 - 100.0 FL    MCH 30.1 24.0 - 34.0 PG    MCHC 33.2 31.0 - 37.0 g/dL    RDW 12.8 11.6 - 14.5 %    PLATELET 952 327 - 734 K/uL    MPV 10.3 9.2 - 11.8 FL    NRBC 0.0 0  WBC    ABSOLUTE NRBC 0.00 0.00 - 0.01 K/uL   GLUCOSE, POC    Collection Time: 04/22/22  6:25 AM   Result Value Ref Range    Glucose (POC) 176 (H) 70 - 110 mg/dL   GLUCOSE, POC    Collection Time: 04/22/22 12:10 PM   Result Value Ref Range    Glucose (POC) 203 (H) 70 - 110 mg/dL         Chemistry Recent Labs     04/22/22  0241 04/21/22  1112   * 112*   * 128*   K 4.6 4.5    96*   CO2 22 25   BUN 25* 19*   CREA 1.42* 1.41*   CA 8.5 9.2   AGAP 7 7   BUCR 18 13   AP  --  93   TP  --  7.6   ALB  --  3.1*   GLOB  --  4.5*   AGRAT  --  0.7*        Lactic Acid Lactic acid   Date Value Ref Range Status   04/21/2022 1.4 0.4 - 2.0 MMOL/L Final     Recent Labs     04/21/22  1112   LAC 1.4        Liver Enzymes Protein, total   Date Value Ref Range Status   04/21/2022 7.6 6.4 - 8.2 g/dL Final     Albumin   Date Value Ref Range Status   04/21/2022 3.1 (L) 3.4 - 5.0 g/dL Final Globulin   Date Value Ref Range Status   04/21/2022 4.5 (H) 2.0 - 4.0 g/dL Final     A-G Ratio   Date Value Ref Range Status   04/21/2022 0.7 (L) 0.8 - 1.7   Final     Alk. phosphatase   Date Value Ref Range Status   04/21/2022 93 45 - 117 U/L Final     Recent Labs     04/21/22  1112   TP 7.6   ALB 3.1*   GLOB 4.5*   AGRAT 0.7*   AP 93        CBC w/Diff Recent Labs     04/22/22  0241 04/21/22  1112   WBC 15.7* 13.9*   RBC 3.39* 3.97*   HGB 10.2* 12.0   HCT 30.7* 36.0    254   GRANS  --  87*   LYMPH  --  6*   EOS  --  2        Cardiac Enzymes No results found for: CPK, CK, CKMMB, CKMB, RCK3, CKMBT, CKNDX, CKND1, ABDIEL, TROPT, TROIQ, TRES, TROPT, TNIPOC, BNP, BNPP     BNP No results found for: BNP, BNPP, XBNPT     Coagulation No results for input(s): PTP, INR, APTT, INREXT in the last 72 hours. Thyroid  No results found for: T4, T3U, TSH, TSHEXT    No results found for: T4     Urinalysis Lab Results   Component Value Date/Time    Color YELLOW 04/21/2022 01:05 PM    Appearance CLEAR 04/21/2022 01:05 PM    Specific gravity 1.018 04/21/2022 01:05 PM    pH (UA) 7.0 04/21/2022 01:05 PM    Protein Negative 04/21/2022 01:05 PM    Glucose Negative 04/21/2022 01:05 PM    Ketone 15 (A) 04/21/2022 01:05 PM    Bilirubin Negative 04/21/2022 01:05 PM    Urobilinogen 1.0 04/21/2022 01:05 PM    Nitrites Negative 04/21/2022 01:05 PM    Leukocyte Esterase MODERATE (A) 04/21/2022 01:05 PM    Epithelial cells 4+ 04/21/2022 01:05 PM    Bacteria 1+ (A) 04/21/2022 01:05 PM    WBC 36 to 50 04/21/2022 01:05 PM    RBC NONE 04/21/2022 01:05 PM        Micro  Recent Labs     04/21/22  1200 04/21/22  1115   CULT NO GROWTH AFTER 19 HOURS NO GROWTH AFTER 19 HOURS     Recent Labs     04/21/22  1200 04/21/22  1115   CULT NO GROWTH AFTER 19 HOURS NO GROWTH AFTER 19 HOURS          Culture data during this hospitalization.    All Micro Results     Procedure Component Value Units Date/Time    Kiersten Caroi, URINE [[id number]]     Order Status: Sent Specimen: Urine     LEGIONELLA PNEUMOPHILA AG, URINE [[id number]]     Order Status: Sent Specimen: Urine     CULTURE, BLOOD [[id number]] Collected: 04/21/22 1115    Order Status: Completed Specimen: Blood Updated: 04/22/22 0720     Special Requests: NO SPECIAL REQUESTS        Culture result: NO GROWTH AFTER 19 HOURS       CULTURE, BLOOD [[id number]] Collected: 04/21/22 1200    Order Status: Completed Specimen: Blood Updated: 04/22/22 0720     Special Requests: NO SPECIAL REQUESTS        Culture result: NO GROWTH AFTER 19 HOURS       CULTURE, URINE [[id number]] Collected: 04/21/22 1305    Order Status: Completed Specimen: Cath Urine Updated: 04/21/22 2327    COVID-19 RAPID TEST [[id number]] Collected: 04/21/22 1200    Order Status: Completed Specimen: Nasopharyngeal Updated: 04/21/22 1237     Specimen source Nasopharyngeal        COVID-19 rapid test Not detected        Comment: Rapid Abbott ID Now       Rapid NAAT:  The specimen is NEGATIVE for SARS-CoV-2, the novel coronavirus associated with COVID-19. Negative results should be treated as presumptive and, if inconsistent with clinical signs and symptoms or necessary for patient management, should be tested with an alternative molecular assay. Negative results do not preclude SARS-CoV-2 infection and should not be used as the sole basis for patient management decisions. This test has been authorized by the FDA under an Emergency Use Authorization (EUA) for use by authorized laboratories.    Fact sheet for Healthcare Providers: ConventionUpdate.co.nz  Fact sheet for Patients: ConventionUpdate.co.nz       Methodology: Isothermal Nucleic Acid Amplification         INFLUENZA A & B AG (RAPID TEST) [[id number]] Collected: 04/21/22 1200    Order Status: Completed Specimen: Nasopharyngeal from Nasal washing Updated: 04/21/22 1236     Influenza A Antigen Negative        Comment: A negative result does not exclude influenza virus infection, seasonal or H1N1 due to suboptimal sensitivity. If influenza is circulating in your community, a diagnosis of influenza should be considered based on a patients clinical presentation and empiric antiviral treatment should be considered, if indicated. Influenza B Antigen Negative                  PFT       Ultrasound       LE Doppler       ECHO       Images report reviewed by me:  CT (Most Recent) (CT chest reviewed by me) Results from Hospital Encounter encounter on 09/02/21    CT CHEST WO CONT    Narrative  EXAM: CT Chest    INDICATION: Tachycardia and hypoxia. .    COMPARISON: Radiographs same day. No prior CT imaging available for  review/comparison. TECHNIQUE: Axial CT imaging from the thoracic inlet through the diaphragm  without    intravenous contrast. Multiplanar reformations were generated. One or more dose reduction techniques were used on this CT: automated exposure  control, adjustment of the mAs and/or kVp according to patient size, and  iterative reconstruction techniques. The specific techniques used on this CT  exam have been documented in the patient's electronic medical record. Digital  Imaging and Communications in Medicine (DICOM) format image data are available  to nonaffiliated external healthcare facilities or entities on a secure, media  free, reciprocally searchable basis with patient authorization for at least a  12-month period after this study. _______________    FINDINGS:    LUNGS:  > No alveolar consolidation. No significant groundglass abnormality or  abnormal septal line thickening.  > Small cluster of 2 to 3 mm subpleural pulmonary nodules within the  peripheral right lower lobe (image numbers [id number]). > 2 to 3 mm fissural nodule along the left major fissure (image 69). > 3 mm nodule posterior left upper lobe (image 34)  > 4 mm medial left upper lobe nodule (image 67)    PLEURA: Unremarkable.     AIRWAY: Diffuse bronchial wall thickening is present with several scattered foci  of endobronchial mucoid impaction. MEDIASTINUM: Included thyroid gland is unremarkable. Cardiac size normal.  Multivessel coronary arterial atherosclerosis. Thoracic aorta is normal in  course/caliber with diffuse atherosclerotic calcifications. LYMPH NODES: No enlarged lymph nodes by size criteria. UPPER ABDOMEN: Excreted contrast is present within the renal collecting systems  from prior attempted PE protocol. Small gallstone within the gallbladder. OSSEOUS STRUCTURES: No acute or aggressive appearing osseous abnormality. Prominent Schmorl's node superior endplate C53.    OTHER:    _______________    Impression  1. Diffuse bronchial wall thickening and scattered foci of endobronchial mucoid  impaction with several faint centrilobular distribution nodules which are very  likely infectious or inflammatory in nature. 2. Several small additional pulmonary nodules as described above. Please see  below guidelines for follow-up. 3. Cholelithiasis.    ========    Fleischner Society pulmonary nodule guidelines (revised 2017): Multiple solid nodules <6 mm:  -Low risk for lung cancer: No follow-up. -High risk for lung cancer: Optional chest CT in 12 months. CXR reviewed by me:  XR (Most Recent). CXR  reviewed by me and compared with previous CXR Results from Hospital Encounter encounter on 04/21/22    XR CHEST PORT    Narrative  EXAM: XR CHEST PORT    CLINICAL INDICATION/HISTORY: cough, SOB  -Additional: None    COMPARISON: 9/6/2021    TECHNIQUE: Frontal view of the chest    _______________    FINDINGS:    HEART AND MEDIASTINUM: Normal cardiac size and mediastinal contours. LUNGS AND PLEURAL SPACES: Bilateral mid to lower lung zone  parenchymal/interstitial opacities. No large effusion or pneumothorax.     BONY THORAX AND SOFT TISSUES: No acute osseous abnormality    _______________    Impression  Bilateral mid to lower lung zone parenchymal/interstitial opacities, edema  versus infection.            Tata Chavis MD  4/22/2022

## 2022-04-22 NOTE — PROGRESS NOTES
Problem: Dysphagia (Adult)  Goal: *Acute Goals and Plan of Care (Insert Text)  Description:   Patient will:  1. Tolerate PO trials with 0 s/s overt distress in 4/5 trials  2. Participate in training and education related to continued aspiration risk, diet recs and compensatory strategies     Recommend:   Regular diet with thin liquids  Meds per patient preference  Aspiration precautions  HOB >45 degrees during all intake and for at least 30 min after po   Small bites/sips, Slow rate of intake    Outcome: Progressing Towards Goal     SPEECH LANGUAGE PATHOLOGY BEDSIDE SWALLOW   EVALUATION & TREATMENT     Patient: Jim Stinson [de-identified]70 y.o. female)  Date: 4/22/2022  Primary Diagnosis: Pneumonia [J18.9]  Respiratory failure with hypoxia ([facility]) [J96.91]  COPD exacerbation ([facility] [address] [J44.1]  Precautions:      PLOF: as per H&P    ASSESSMENT :  Based on the objective data described below, the patient presents with no overt s/sx of dysphagia at this time. Patient A&Ox4. 90* at edge of bed. Family member at bedside. Patient reported no difficulties swallowing solids or liquids. Lunch tray of regular texture with thin liquids at bedside. OME (oral mech exam) revealed WNL for all tasks performed. No overt s/sx of aspiration/penetration noted for all consistencies consumed (regular and thin liquid via straw). Patient able to self feed. Adequate mastication, bolus manipulation, and A-P transit observed. Laryngeal elevation upon palpation noted with timely swallow initiation. Recommend patient remain in regular diet with thin liquids. SLP following 1-2x to check diet tolerance. TREATMENT :  Skilled therapy initiated; Educated pt on aspiration precautions and importance of compensatory swallow techniques to decrease aspiration risk (decrease rate of intake & sip/bite size, upright @HOB for all po intake and ~30 minutes after po); verbalized comprehension. SLP to follow as indicated.      Patient will benefit from skilled intervention to address the above impairments. Patient's rehabilitation potential is considered to be Excellent  Factors which may influence rehabilitation potential include:   []            None noted  []            Mental ability/status  [x]            Medical condition  []            Home/family situation and support systems  []            Safety awareness  []            Pain tolerance/management  []            Other:      PLAN :  Recommendations and Planned Interventions:  See above  Frequency/Duration: Patient will be followed by speech-language pathology 1-2 times to check diet tolerance. Discharge Recommendations: To Be Determined     SUBJECTIVE:   Patient stated I eat just fine. OBJECTIVE:     Past Medical History:   Diagnosis Date    Asthma     Chronic obstructive pulmonary disease ([facility] [address]     Hypertension    No past surgical history on file. Home Situation: did not assess  Diet prior to admission: regular with thin liquids  Current Diet:  regular with thin liquids   Cognitive and Communication Status:  Neurologic State: Alert  Orientation Level: Oriented X4  Cognition: Appropriate decision making,Appropriate for age attention/concentration,Appropriate safety awareness,Follows commands  Oral Assessment:  Oral Assessment  Labial: No impairment  Dentition: Natural  Oral Hygiene: adequate  Lingual: No impairment  Velum: No impairment  Mandible: No impairment  P.O. Trials:  Patient Position: 90 at edge of bed  Vocal quality prior to P.O.: Low volume;Breathy  Consistency Presented: Solid; Thin liquid  How Presented: Self-fed/presented;Cup/sip;Straw;Successive swallows  Bolus Acceptance: No impairment  Bolus Formation/Control: No impairment  Propulsion: No impairment  Oral Residue: None  Initiation of Swallow: No impairment  Laryngeal Elevation: Functional  Aspiration Signs/Symptoms: None  Pharyngeal Phase Characteristics: No impairment, issues, or problems   Effective Modifications: None  Cues for Modifications: None  Oral Phase Severity: No impairment  Pharyngeal Phase Severity : No impairment  PAIN:  Pain level pre-treatment: non reported/10   Pain level post-treatment: non reported/10   After treatment:   []            Patient left in no apparent distress sitting up in chair  [x]            Patient left in no apparent distress in bed  [x]            Call bell left within reach  [x]            Nursing notified  [x]            Family present  []            Caregiver present  []            Bed alarm activated    COMMUNICATION/EDUCATION:   [x]            Aspiration precautions; swallow safety; compensatory techniques. [x]            Patient/family have participated as able in goal setting and plan of care. []            Patient/family agree to work toward stated goals and plan of care. []            Patient understands intent and goals of therapy; neutral about participation. []            Patient unable to participate in goal setting/plan of care; educ ongoing with interdisciplinary staff  []         Posted safety precautions in patient's room.     Thank you for this referral.  Gavin Young M.S., CFY-SLP  Speech-Language Pathologist

## 2022-04-22 NOTE — DIABETES MGMT
Diabetes/ Glycemic Control Plan of Care  Recommendations:   Continue IP glucose monitoring and corrective Humalog       Assessment: Patient without known history of diabetes. BG elevation after steroid administration and ranging 166-203 mg/dl since admission. Patient reports refusing insulin last night as she has never required insulin before and had concerns over long term effects. Reviewed with patient insulin use and indications in the hospital.     Steroids:   Rx Glucocorticoids (24h ago, onward)             Start     Dose Route Frequency Ordered Stop    04/21/22 1900  methylPREDNISolone (PF) (SOLU-MEDROL) injection 40 mg         40 mg IV EVERY 8 HOURS 04/21/22 1733 --                 Recent Glucose Results:   Lab Results   Component Value Date/Time     (H) 04/22/2022 02:41 AM    GLUCPOC 203 (H) 04/22/2022 12:10 PM    GLUCPOC 176 (H) 04/22/2022 06:25 AM    GLUCPOC 189 (H) 04/21/2022 09:08 PM      BG within target range (non-ICU: <180; -180):  [] Yes    [x] No   Current insulin orders: corrective Humalog  Total Daily Dose previous 24 hours = none  Current A1c: No results found for: HBA1C, PFV5DGOP, AKS3DYEQ     Nutrition/Diet:   Active Orders   Diet    ADULT DIET Regular; 4 carb choices (60 gm/meal)      Meal Intake:  Patient Vitals for the past 168 hrs:   % Diet Eaten   04/21/22 1830 76 - 100%     Home diabetes medications:   Key Antihyperglycemic Medications     Patient is on no antihyperglycemic meds. Plan/Goals:   Blood glucose will be within target of 70 - 180 mg/dl within 72 hours  Reinforce dietary and medication compliance at home.          Education:  [] Refer to Diabetes Education Record                       [x] Education not indicated at this time     Jo Aponte RD  Glycemic Control Team  [phone]    Monday-Friday   9 am - 3 pm

## 2022-04-22 NOTE — PROGRESS NOTES
I spoke to patient and at this time she does not want to have further test I cancel PVL and CXr. I offered CT or V/q due to hypoxemia but she declined. I told her to let me know if she changes her mind  She appears to have reaction to Spiriva but tolerates well other anticholinergics like ipratropium and Yuperli  Full note to follow  VARUN Smith MD

## 2022-04-23 LAB
ANION GAP SERPL CALC-SCNC: 7 MMOL/L (ref 3–18)
BUN SERPL-MCNC: 29 MG/DL (ref 7–18)
BUN/CREAT SERPL: 23 (ref 12–20)
CALCIUM SERPL-MCNC: 8.6 MG/DL (ref 8.5–10.1)
CHLORIDE SERPL-SCNC: 104 MMOL/L (ref 100–111)
CO2 SERPL-SCNC: 21 MMOL/L (ref 21–32)
CREAT SERPL-MCNC: 1.27 MG/DL (ref 0.6–1.3)
ERYTHROCYTE [DISTWIDTH] IN BLOOD BY AUTOMATED COUNT: 13 % (ref 11.6–14.5)
EST. AVERAGE GLUCOSE BLD GHB EST-MCNC: 117 MG/DL
GLUCOSE BLD STRIP.AUTO-MCNC: 154 MG/DL (ref 70–110)
GLUCOSE BLD STRIP.AUTO-MCNC: 155 MG/DL (ref 70–110)
GLUCOSE BLD STRIP.AUTO-MCNC: 164 MG/DL (ref 70–110)
GLUCOSE BLD STRIP.AUTO-MCNC: 216 MG/DL (ref 70–110)
GLUCOSE SERPL-MCNC: 150 MG/DL (ref 74–99)
HBA1C MFR BLD: 5.7 % (ref 4.2–5.6)
HCT VFR BLD AUTO: 30.9 % (ref 35–45)
HGB BLD-MCNC: 9.8 G/DL (ref 12–16)
MCH RBC QN AUTO: 30.2 PG (ref 24–34)
MCHC RBC AUTO-ENTMCNC: 31.7 G/DL (ref 31–37)
MCV RBC AUTO: 95.4 FL (ref 78–100)
NRBC # BLD: 0 K/UL (ref 0–0.01)
NRBC BLD-RTO: 0 PER 100 WBC
PLATELET # BLD AUTO: 236 K/UL (ref 135–420)
PMV BLD AUTO: 10.6 FL (ref 9.2–11.8)
POTASSIUM SERPL-SCNC: 4.9 MMOL/L (ref 3.5–5.5)
RBC # BLD AUTO: 3.24 M/UL (ref 4.2–5.3)
SODIUM SERPL-SCNC: 132 MMOL/L (ref 136–145)
WBC # BLD AUTO: 15.7 K/UL (ref 4.6–13.2)

## 2022-04-23 PROCEDURE — 82962 GLUCOSE BLOOD TEST: CPT

## 2022-04-23 PROCEDURE — 74011000250 HC RX REV CODE- 250: Performed by: HOSPITALIST

## 2022-04-23 PROCEDURE — 74011250636 HC RX REV CODE- 250/636: Performed by: INTERNAL MEDICINE

## 2022-04-23 PROCEDURE — 96376 TX/PRO/DX INJ SAME DRUG ADON: CPT

## 2022-04-23 PROCEDURE — 97535 SELF CARE MNGMENT TRAINING: CPT

## 2022-04-23 PROCEDURE — 74011250637 HC RX REV CODE- 250/637: Performed by: INTERNAL MEDICINE

## 2022-04-23 PROCEDURE — 80048 BASIC METABOLIC PNL TOTAL CA: CPT

## 2022-04-23 PROCEDURE — 85027 COMPLETE CBC AUTOMATED: CPT

## 2022-04-23 PROCEDURE — 74011250636 HC RX REV CODE- 250/636: Performed by: HOSPITALIST

## 2022-04-23 PROCEDURE — G0378 HOSPITAL OBSERVATION PER HR: HCPCS

## 2022-04-23 PROCEDURE — 74011250637 HC RX REV CODE- 250/637: Performed by: HOSPITALIST

## 2022-04-23 PROCEDURE — 77010033678 HC OXYGEN DAILY

## 2022-04-23 PROCEDURE — 97165 OT EVAL LOW COMPLEX 30 MIN: CPT

## 2022-04-23 PROCEDURE — 94640 AIRWAY INHALATION TREATMENT: CPT

## 2022-04-23 PROCEDURE — 83036 HEMOGLOBIN GLYCOSYLATED A1C: CPT

## 2022-04-23 PROCEDURE — 94760 N-INVAS EAR/PLS OXIMETRY 1: CPT

## 2022-04-23 PROCEDURE — 97161 PT EVAL LOW COMPLEX 20 MIN: CPT

## 2022-04-23 PROCEDURE — 74011636637 HC RX REV CODE- 636/637: Performed by: HOSPITALIST

## 2022-04-23 PROCEDURE — 65270000046 HC RM TELEMETRY

## 2022-04-23 PROCEDURE — 74011636637 HC RX REV CODE- 636/637: Performed by: INTERNAL MEDICINE

## 2022-04-23 PROCEDURE — 97116 GAIT TRAINING THERAPY: CPT

## 2022-04-23 RX ORDER — PREDNISONE 20 MG/1
40 TABLET ORAL
Status: DISCONTINUED | OUTPATIENT
Start: 2022-04-23 | End: 2022-04-24 | Stop reason: HOSPADM

## 2022-04-23 RX ORDER — LEVOFLOXACIN 500 MG/1
500 TABLET, FILM COATED ORAL EVERY 24 HOURS
Status: DISCONTINUED | OUTPATIENT
Start: 2022-04-23 | End: 2022-04-24 | Stop reason: HOSPADM

## 2022-04-23 RX ADMIN — TRIAMCINOLONE ACETONIDE: 1 OINTMENT TOPICAL at 09:32

## 2022-04-23 RX ADMIN — PANTOPRAZOLE SODIUM 40 MG: 40 TABLET, DELAYED RELEASE ORAL at 16:54

## 2022-04-23 RX ADMIN — LOPERAMIDE HYDROCHLORIDE 4 MG: 2 CAPSULE ORAL at 21:35

## 2022-04-23 RX ADMIN — HEPARIN SODIUM 5000 UNITS: 5000 INJECTION INTRAVENOUS; SUBCUTANEOUS at 04:31

## 2022-04-23 RX ADMIN — Medication 4 UNITS: at 13:15

## 2022-04-23 RX ADMIN — IPRATROPIUM BROMIDE AND ALBUTEROL SULFATE 3 ML: .5; 3 SOLUTION RESPIRATORY (INHALATION) at 07:27

## 2022-04-23 RX ADMIN — Medication 2 UNITS: at 06:44

## 2022-04-23 RX ADMIN — HEPARIN SODIUM 5000 UNITS: 5000 INJECTION INTRAVENOUS; SUBCUTANEOUS at 21:35

## 2022-04-23 RX ADMIN — BUDESONIDE 500 MCG: 0.5 INHALANT RESPIRATORY (INHALATION) at 21:15

## 2022-04-23 RX ADMIN — ACETAMINOPHEN 650 MG: 325 TABLET ORAL at 21:35

## 2022-04-23 RX ADMIN — HEPARIN SODIUM 5000 UNITS: 5000 INJECTION INTRAVENOUS; SUBCUTANEOUS at 13:15

## 2022-04-23 RX ADMIN — DIPHENHYDRAMINE HYDROCHLORIDE 12.5 MG: 50 INJECTION, SOLUTION INTRAMUSCULAR; INTRAVENOUS at 21:36

## 2022-04-23 RX ADMIN — PREDNISONE 40 MG: 20 TABLET ORAL at 09:30

## 2022-04-23 RX ADMIN — LEVOFLOXACIN 500 MG: 500 TABLET, FILM COATED ORAL at 09:31

## 2022-04-23 RX ADMIN — IPRATROPIUM BROMIDE AND ALBUTEROL SULFATE 3 ML: .5; 3 SOLUTION RESPIRATORY (INHALATION) at 15:27

## 2022-04-23 RX ADMIN — GUAIFENESIN 600 MG: 600 TABLET, EXTENDED RELEASE ORAL at 21:36

## 2022-04-23 RX ADMIN — PANTOPRAZOLE SODIUM 40 MG: 40 TABLET, DELAYED RELEASE ORAL at 06:44

## 2022-04-23 RX ADMIN — CETIRIZINE HYDROCHLORIDE 10 MG: 10 TABLET, FILM COATED ORAL at 09:30

## 2022-04-23 RX ADMIN — ASPIRIN 81 MG: 81 TABLET, CHEWABLE ORAL at 09:30

## 2022-04-23 RX ADMIN — ARFORMOTEROL TARTRATE 15 MCG: 15 SOLUTION RESPIRATORY (INHALATION) at 07:26

## 2022-04-23 RX ADMIN — BUDESONIDE 500 MCG: 0.5 INHALANT RESPIRATORY (INHALATION) at 07:26

## 2022-04-23 RX ADMIN — ARFORMOTEROL TARTRATE 15 MCG: 15 SOLUTION RESPIRATORY (INHALATION) at 21:15

## 2022-04-23 RX ADMIN — Medication 2 UNITS: at 17:29

## 2022-04-23 RX ADMIN — IPRATROPIUM BROMIDE AND ALBUTEROL SULFATE 3 ML: .5; 3 SOLUTION RESPIRATORY (INHALATION) at 11:29

## 2022-04-23 RX ADMIN — Medication 2 UNITS: at 21:43

## 2022-04-23 RX ADMIN — GUAIFENESIN 600 MG: 600 TABLET, EXTENDED RELEASE ORAL at 09:30

## 2022-04-23 RX ADMIN — IPRATROPIUM BROMIDE AND ALBUTEROL SULFATE 3 ML: .5; 3 SOLUTION RESPIRATORY (INHALATION) at 21:15

## 2022-04-23 RX ADMIN — METHYLPREDNISOLONE SODIUM SUCCINATE 40 MG: 40 INJECTION, POWDER, FOR SOLUTION INTRAMUSCULAR; INTRAVENOUS at 04:31

## 2022-04-23 NOTE — PROGRESS NOTES
[facility]  Pulmonary, Critical Care, and Sleep Medicine    Name: Theodis Frankel MRN: [id number]   : 1950 Hospital: [facility] [place]    Date: 2022  Room: [address] [place] [address] Note                                              Consult requesting physician: Dr. Estrella Elaine    Reason for Consult: Acute hypoxemic respiratory failure, COPD exacerbation,      IMPRESSION:   · Acute hypoxemic respiratory failure  · Copd exacerbation  · Hyperglycemia  · Hyponatremia   · GUERRERO  Patient Active Problem List   Diagnosis Code    Hypertension I10    COPD exacerbation ([facility] [address] J44.1    Hyponatremia E87.1    Cholelithiasis K80.20    Sinus tachycardia R00.0    Pneumonia J18.9    Respiratory failure with hypoxia ([facility] [address] J96.91           · Code status: Full code       RECOMMENDATIONS:   Respiratory: Acute hypoxemic respiratory failure currently on nasal cannula ABG indicates 7.4 [phone]  Due to acute COPD exacerbation. Oxygenation imporved overnight  Chest x-ray personally reviewed mild interstitial changes BNP not significantly elevated and troponin negative. Cannot exclude thromboembolic disease however less likely last September we did the VQ scan was negative. Offered patient VQ scan PVL or CTA but she does not want on to have more tests at this point. Continue treatment for COPD exacerbation. Patient is at home on Brovana and Pulmicort with good compliance she also has DuoNeb. Recently was started on Spiriva. To have reaction to Spiriva but is tolerating well other on the cholinergic including ipratropium and Yuperli  Continue steroids. Agree with antibiotics. I wanted to repeat chest x-ray PA and lateral at this point since we do not have a CAT scan but patient declined today we will discuss that again tomorrow. Patient had thromboembolic disease but many years ago. Keep SPO2 >=92%. HOB 30 degree elevation all the time. Aggressive pulmonary toileting. Aspiration precautions. Incentive spirometry. CVS: Pretension on metoprolol and ARB at home. Troponin negative. Last echo was performed in September. Hypertensive heart. Could have diastolic heart failure but BNP is not significantly elevated avoid fluid overload. At home on diuretics dose not clear. ID: WBC cells elevated possible urinary tract infection and COPD exacerbation agree with antibiotics. Recommend sputum cultures patient agreed to do if able to produce  Sepsis bundle and protocol followed. Follow serial lactic acid, frequent BMP check, fluid resuscitation. Follow cultures. Deescalate antibiotic when appropriate. Hematology/Oncology: White blood cells elevated most likely due to infection additional contributor steroid  Renal: Renal insufficiency Baseline creatinine about 1.2. Worsening on this admission. GI/: UA positive continue antibiotics if able to tolerate remove Gregory catheter  Endocrine: Monitor BS. SSI. Glycemia on insulin  Neurology: Wake and alert    Pain/Sedation: pRN  Skin/Wound: None  Electrolytes: Replace electrolytes per  electrolyte replacement protocol. IVF: got hydration overnight I would stop IV fluids  Nutrition: cardiac  Prophylaxis: DVT Prophylaxis: SCD/yes sq heparin GI Prophylaxis: Protonix/   Restraints: none    Lines/Tubes: PIV  Other:  PT/OT eval and treat. OOB. Advance Directive/Palliative Care: consulted    Will defer respective systems problem management to primary and other respective consultant and follow patient in ICU with primary and other medical team.  Further recommendations will be based on the patient's response to recommended treatment and results of the investigation ordered. Quality Care: PPI, DVT prophylaxis, HOB elevated, Infection control all reviewed and addressed.     Care of plan d/w hospitalist team, RN, RT, MDR.  D/w patient and daughter in law     High complexity decision making was performed during the evaluation of this patient at high risk for decompensation with multiple organ involvement. .         Subjective/History of Present Illness:     Patient is a 70 y.o. female with PMHx significant for moderate COPD, ex smoker, lung nodules, hypertensive heart, increased BMI chronic renal insufficiency baseline creatinine around 1.2. At home compliant with COPD medications Brovana and Pulmicort and DuoNeb. He recently had a trial of Spiriva but developed side effects. Switch to other anticholinergic with good tolerance Yuperli. Patient has been unwell for about 2 to 4 weeks, last 2 weeks wheezing cough shortness of breath chest tightness. Mostly dry cough. No fever. On admission to emergency room received steroids bronchodilators with some improvement. Found to have significant hypoxemia. Previous admission had a VQ scan and PVL in September 2021. PO2 59 on ABG. Evaluated in the room 355    4/23/2022:  Evaluated in room 355 currently on nasal cannula. Oxygenation imporved  Ok to change to prednisone and oral abx  6 minutes walk test   Dc home on her old medication  brovana  pulmicort and Yuperli  I will sign off call me if new issues      I/O last 24 hrs: Intake/Output Summary (Last 24 hours) at 4/23/2022 0930  Last data filed at 4/23/2022 2364  Gross per 24 hour   Intake 480 ml   Output    Net 480 ml             History taken from patient and EMR    Review of Systems:  A comprehensive review of systems was negative except for that written in the HPI.        Review of Systems:   HEENT: No epistaxis, no nasal drainage, no difficulty in swallowing, no redness in eyes  Respiratory: as above  Cardiovascular: no chest pain, no palpitations, no chronic leg edema, no syncope  Gastrointestinal: no abd pain, no vomiting, no diarrhea, no bleeding symptoms  Genitourinary: No urinary symptoms or hematuria  Musculoskeletal: Neg  Neurological: No focal weakness, no seizures, no headaches, restless legs   Behvioral/Psych: No anxiety, no depression  General : No fever, no chills, no weight loss, no night sweats     Allergies   Allergen Reactions    Codeine Rash    Demerol [Meperidine] Rash    Spiriva Respimat [Tiotropium Bromide] Other (comments)     Wheezing  ( tolerates other anticholinergics like Itratroprium )    Sulfa (Sulfonamide Antibiotics) Rash      Past Medical History:   Diagnosis Date    Asthma     Chronic obstructive pulmonary disease ([facility] [address]     Hypertension       No past surgical history on file. Social History     Tobacco Use    Smoking status: Current Every Day Smoker     Packs/day: 0.50    Smokeless tobacco: Never Used   Substance Use Topics    Alcohol use: Not Currently      History reviewed. No pertinent family history. Prior to Admission medications    Medication Sig Start Date End Date Taking? Authorizing Provider   diphenhydrAMINE-acetaminophen (Percogesic Extra Strength) 12.5-500 mg tab Take 2 Tablets by mouth daily. Yes Provider, Historical   albuterol-ipratropium (DUO-NEB) 2.5 mg-0.5 mg/3 ml nebu 3 mL by Nebulization route every six (6) hours as needed for Wheezing. 9/8/21  Yes Ulysses Harvard, MD   arformoteroL (BROVANA) 15 mcg/2 mL nebu neb solution 2 mL by Nebulization route two (2) times a day. 9/8/21  Yes Ulysses Harvard, MD   budesonide (PULMICORT) 0.5 mg/2 mL nbsp 2 mL by Nebulization route two (2) times a day. 9/8/21  Yes Ulysses Harvard, MD   dilTIAZem ER (CARDIZEM CD) 240 mg capsule Take 1 Capsule by mouth daily. 9/9/21  Yes Ulysses Harvard, MD   guaiFENesin ER (MUCINEX) 600 mg ER tablet Take 1 Tablet by mouth every twelve (12) hours. 9/8/21  Yes Ulysses Harvard, MD   telmisartan (MICARDIS) 80 mg tablet Take 1 Tablet by mouth daily. 9/8/21  Yes Ulysses Harvard, MD   diphenhydrAMINE (Benadryl Allergy) 25 mg tablet Take 25 mg by mouth every six (6) hours as needed for Allergies. Yes Provider, Historical   cetirizine (ZyrTEC) 10 mg tablet Take 10 mg by mouth daily.    Yes Provider, Historical   metoprolol tartrate (LOPRESSOR) 50 mg tablet Take 50 mg by mouth two (2) times a day. Yes Provider, Historical   multivitamin (ONE A DAY) tablet Take 1 Tablet by mouth daily. Yes Provider, Historical   aspirin 81 mg chewable tablet Take 1 Tablet by mouth daily.  9/9/21   Babak Sanders MD   predniSONE (STERAPRED DS) 10 mg dose pack See administration instruction per 10mg dose pack  Patient taking differently: 60 mg, 6 pills on the first day  50 mg, 5 pills on the second day  40 mg, 4 pills on the third day  30 mg, 3 pills on the fourth day  20 mg, 2 pills on the fifth day  10 mg, 1 pill on the sixth day 9/8/21   Babak Sanders MD     Current Facility-Administered Medications   Medication Dose Route Frequency    levoFLOXacin (LEVAQUIN) tablet 500 mg  500 mg Oral Q24H    predniSONE (DELTASONE) tablet 40 mg  40 mg Oral DAILY WITH BREAKFAST    albuterol-ipratropium (DUO-NEB) 2.5 MG-0.5 MG/3 ML  3 mL Nebulization Q4H RT    aspirin chewable tablet 81 mg  81 mg Oral DAILY    cetirizine (ZYRTEC) tablet 10 mg  10 mg Oral DAILY    dilTIAZem ER (CARDIZEM CD) capsule 240 mg  240 mg Oral DAILY    guaiFENesin ER (MUCINEX) tablet 600 mg  600 mg Oral Q12H    metoprolol tartrate (LOPRESSOR) tablet 50 mg  50 mg Oral BID    insulin lispro (HUMALOG) injection   SubCUTAneous AC&HS    heparin (porcine) injection 5,000 Units  5,000 Units SubCUTAneous Q8H    pantoprazole (PROTONIX) tablet 40 mg  40 mg Oral ACB&D    [Held by provider] 0.9% sodium chloride infusion  75 mL/hr IntraVENous CONTINUOUS    arformoteroL (BROVANA) neb solution 15 mcg  15 mcg Nebulization BID RT    budesonide (PULMICORT) 500 mcg/2 ml nebulizer suspension  500 mcg Nebulization BID RT    triamcinolone acetonide (KENALOG) 0.1 % ointment (Patient Supplied)   Topical BID         Objective:   Vital Signs:    Visit Vitals  BP (!) 106/50   Pulse 77   Temp 98.3 °F (36.8 °C)   Resp 18   Ht 5' 7\" (1.702 m)   Wt 83.9 kg (185 lb)   SpO2 96%   BMI 28.98 kg/m²       O2 Device: Nasal cannula   O2 Flow Rate (L/min): 1 l/min (DECREASED FROM 2L)   Temp (24hrs), Av.7 °F (36.5 °C), Min:97.4 °F (36.3 °C), Max:98.3 °F (36.8 °C)       Intake/Output:   Last shift:      701 - 1900  In: 480 [P.O.:480]  Out: -     Last 3 shifts: 1901 -  07  In: -   Out: 500 [Urine:500]      Intake/Output Summary (Last 24 hours) at 2022 0930  Last data filed at 2022 0912  Gross per 24 hour   Intake 480 ml   Output    Net 480 ml       Last 3 Recorded Weights in this Encounter    22 1058   Weight: 83.9 kg (185 lb)             Recent Labs     22  1128   PHI 7.45   PCO2I 30.7*   PO2I 59*   HCO3I 21.5*   FIO2I 21       Physical Exam:     Impresson general : Alert, Awake, in mild respiratory distress, obese  Head:   Normocephalic,atraumatic. ENT:   EOM  no scleral icterus, no pallor, no cyanosis. Nose:   No sinus tenderness  Throat:  Oropharynx ,mucosa, and tongue normal. No oral thrush. Neck:   Supple, symmetric. Lymph nodes. Trachea is midline   Lung: Moderate air entry bilateral equal  olga lidia  rales. No rhonchi. olga lidia wheezing. No stridors. No prolongded expiration. No accessory muscle use. Heart:   Regular  rate & rhythm. S1 S2 present. No murmur. No JVD. Abdomen:  Soft. NT.obese  ND. +BS. No masses. liver  and spleen  Extremities:  No pedal edema. No cyanosis. No clubbing. Pulses: 2+ and symmetric in DP. Capillary refill: normal  Lymphatic:  neck and supraclavicular    Musculoskeletal: No joint swelling. No tenderness. Skin:   Lesion Color, texture, turgor normal. No rashes or lesions.        Data:       Recent Results (from the past 24 hour(s))   GLUCOSE, POC    Collection Time: 22 12:10 PM   Result Value Ref Range    Glucose (POC) 203 (H) 70 - 110 mg/dL   GLUCOSE, POC    Collection Time: 22  4:42 PM   Result Value Ref Range    Glucose (POC) 147 (H) 70 - 110 mg/dL   GLUCOSE, POC    Collection Time: 22  9:28 PM   Result Value Ref Range Glucose (POC) 189 (H) 70 - 815 mg/dL   METABOLIC PANEL, BASIC    Collection Time: 04/23/22  4:11 AM   Result Value Ref Range    Sodium 132 (L) 136 - 145 mmol/L    Potassium 4.9 3.5 - 5.5 mmol/L    Chloride 104 100 - 111 mmol/L    CO2 21 21 - 32 mmol/L    Anion gap 7 3.0 - 18 mmol/L    Glucose 150 (H) 74 - 99 mg/dL    BUN 29 (H) 7.0 - 18 MG/DL    Creatinine 1.27 0.6 - 1.3 MG/DL    BUN/Creatinine ratio 23 (H) 12 - 20      GFR est AA 50 (L) >60 ml/min/1.73m2    GFR est non-AA 41 (L) >60 ml/min/1.73m2    Calcium 8.6 8.5 - 10.1 MG/DL   CBC W/O DIFF    Collection Time: 04/23/22  4:11 AM   Result Value Ref Range    WBC 15.7 (H) 4.6 - 13.2 K/uL    RBC 3.24 (L) 4.20 - 5.30 M/uL    HGB 9.8 (L) 12.0 - 16.0 g/dL    HCT 30.9 (L) 35.0 - 45.0 %    MCV 95.4 78.0 - 100.0 FL    MCH 30.2 24.0 - 34.0 PG    MCHC 31.7 31.0 - 37.0 g/dL    RDW 13.0 11.6 - 14.5 %    PLATELET 710 626 - 343 K/uL    MPV 10.6 9.2 - 11.8 FL    NRBC 0.0 0  WBC    ABSOLUTE NRBC 0.00 0.00 - 0.01 K/uL   GLUCOSE, POC    Collection Time: 04/23/22  6:36 AM   Result Value Ref Range    Glucose (POC) 164 (H) 70 - 110 mg/dL         Chemistry Recent Labs     04/23/22  0411 04/22/22  0241 04/21/22  1112   * 166* 112*   * 130* 128*   K 4.9 4.6 4.5    101 96*   CO2 21 22 25   BUN 29* 25* 19*   CREA 1.27 1.42* 1.41*   CA 8.6 8.5 9.2   AGAP 7 7 7   BUCR 23* 18 13   AP  --   --  93   TP  --   --  7.6   ALB  --   --  3.1*   GLOB  --   --  4.5*   AGRAT  --   --  0.7*        Lactic Acid Lactic acid   Date Value Ref Range Status   04/21/2022 1.4 0.4 - 2.0 MMOL/L Final     Recent Labs     04/21/22  1112   LAC 1.4        Liver Enzymes Protein, total   Date Value Ref Range Status   04/21/2022 7.6 6.4 - 8.2 g/dL Final     Albumin   Date Value Ref Range Status   04/21/2022 3.1 (L) 3.4 - 5.0 g/dL Final     Globulin   Date Value Ref Range Status   04/21/2022 4.5 (H) 2.0 - 4.0 g/dL Final     A-G Ratio   Date Value Ref Range Status   04/21/2022 0.7 (L) 0.8 - 1.7 Final     Alk. phosphatase   Date Value Ref Range Status   04/21/2022 93 45 - 117 U/L Final     Recent Labs     04/21/22  1112   TP 7.6   ALB 3.1*   GLOB 4.5*   AGRAT 0.7*   AP 93        CBC w/Diff Recent Labs     04/23/22  0411 04/22/22  0241 04/21/22  1112   WBC 15.7* 15.7* 13.9*   RBC 3.24* 3.39* 3.97*   HGB 9.8* 10.2* 12.0   HCT 30.9* 30.7* 36.0    241 254   GRANS  --   --  87*   LYMPH  --   --  6*   EOS  --   --  2        Cardiac Enzymes No results found for: CPK, CK, CKMMB, CKMB, RCK3, CKMBT, CKNDX, CKND1, ABDIEL, TROPT, TROIQ, TRES, TROPT, TNIPOC, BNP, BNPP     BNP No results found for: BNP, BNPP, XBNPT     Coagulation No results for input(s): PTP, INR, APTT, INREXT, INREXT in the last 72 hours. Thyroid  No results found for: T4, T3U, TSH, TSHEXT, TSHEXT    No results found for: T4     Urinalysis Lab Results   Component Value Date/Time    Color YELLOW 04/21/2022 01:05 PM    Appearance CLEAR 04/21/2022 01:05 PM    Specific gravity 1.018 04/21/2022 01:05 PM    pH (UA) 7.0 04/21/2022 01:05 PM    Protein Negative 04/21/2022 01:05 PM    Glucose Negative 04/21/2022 01:05 PM    Ketone 15 (A) 04/21/2022 01:05 PM    Bilirubin Negative 04/21/2022 01:05 PM    Urobilinogen 1.0 04/21/2022 01:05 PM    Nitrites Negative 04/21/2022 01:05 PM    Leukocyte Esterase MODERATE (A) 04/21/2022 01:05 PM    Epithelial cells 4+ 04/21/2022 01:05 PM    Bacteria 1+ (A) 04/21/2022 01:05 PM    WBC 36 to 50 04/21/2022 01:05 PM    RBC NONE 04/21/2022 01:05 PM        Micro  Recent Labs     04/21/22  1305 04/21/22  1200 04/21/22  1115   CULT GRAM NEGATIVE RODS* NO GROWTH AFTER 19 HOURS NO GROWTH AFTER 19 HOURS     Recent Labs     04/21/22  1305 04/21/22  1200 04/21/22  1115   CULT GRAM NEGATIVE RODS* NO GROWTH AFTER 19 HOURS NO GROWTH AFTER 19 HOURS          Culture data during this hospitalization.    All Micro Results     Procedure Component Value Units Date/Time    CULTURE, URINE [[id number]]  (Abnormal) Collected: 04/21/22 1305 Order Status: Completed Specimen: Cath Urine Updated: 04/22/22 1843     Special Requests: NO SPECIAL REQUESTS        [place] Count --        1000  COLONIES/mL       Culture result: GRAM NEGATIVE RODS       STREP PNEUMO AG, URINE [[id number]]     Order Status: Sent Specimen: Urine     LEGIONELLA PNEUMOPHILA AG, URINE [[id number]]     Order Status: Sent Specimen: Urine     CULTURE, BLOOD [[id number]] Collected: 04/21/22 1115    Order Status: Completed Specimen: Blood Updated: 04/22/22 0720     Special Requests: NO SPECIAL REQUESTS        Culture result: NO GROWTH AFTER 19 HOURS       CULTURE, BLOOD [[id number]] Collected: 04/21/22 1200    Order Status: Completed Specimen: Blood Updated: 04/22/22 0720     Special Requests: NO SPECIAL REQUESTS        Culture result: NO GROWTH AFTER 19 HOURS       COVID-19 RAPID TEST [[id number]] Collected: 04/21/22 1200    Order Status: Completed Specimen: Nasopharyngeal Updated: 04/21/22 1237     Specimen source Nasopharyngeal        COVID-19 rapid test Not detected        Comment: Rapid Abbott ID Now       Rapid NAAT:  The specimen is NEGATIVE for SARS-CoV-2, the novel coronavirus associated with COVID-19. Negative results should be treated as presumptive and, if inconsistent with clinical signs and symptoms or necessary for patient management, should be tested with an alternative molecular assay. Negative results do not preclude SARS-CoV-2 infection and should not be used as the sole basis for patient management decisions. This test has been authorized by the FDA under an Emergency Use Authorization (EUA) for use by authorized laboratories.    Fact sheet for Healthcare Providers: BagFit.fi  Fact sheet for Patients: Stephenie.fi       Methodology: Isothermal Nucleic Acid Amplification         INFLUENZA A & B AG (RAPID TEST) [[id number]] Collected: 04/21/22 1200    Order Status: Completed Specimen: Nasopharyngeal from Nasal washing Updated: 04/21/22 1236     Influenza A Antigen Negative        Comment: A negative result does not exclude influenza virus infection, seasonal or H1N1 due to suboptimal sensitivity. If influenza is circulating in your community, a diagnosis of influenza should be considered based on a patients clinical presentation and empiric antiviral treatment should be considered, if indicated. Influenza B Antigen Negative                  PFT       Ultrasound       LE Doppler       ECHO       Images report reviewed by me:  CT (Most Recent) (CT chest reviewed by me) Results from Hospital Encounter encounter on 09/02/21    CT CHEST WO CONT    Narrative  EXAM: CT Chest    INDICATION: Tachycardia and hypoxia. .    COMPARISON: Radiographs same day. No prior CT imaging available for  review/comparison. TECHNIQUE: Axial CT imaging from the thoracic inlet through the diaphragm  without    intravenous contrast. Multiplanar reformations were generated. One or more dose reduction techniques were used on this CT: automated exposure  control, adjustment of the mAs and/or kVp according to patient size, and  iterative reconstruction techniques. The specific techniques used on this CT  exam have been documented in the patient's electronic medical record. Digital  Imaging and Communications in Medicine (DICOM) format image data are available  to nonaffiliated external healthcare facilities or entities on a secure, media  free, reciprocally searchable basis with patient authorization for at least a  12-month period after this study. _______________    FINDINGS:    LUNGS:  > No alveolar consolidation. No significant groundglass abnormality or  abnormal septal line thickening.  > Small cluster of 2 to 3 mm subpleural pulmonary nodules within the  peripheral right lower lobe (image numbers [id number]). > 2 to 3 mm fissural nodule along the left major fissure (image 69).   > 3 mm nodule posterior left upper lobe (image 34)  > 4 mm medial left upper lobe nodule (image 67)    PLEURA: Unremarkable. AIRWAY: Diffuse bronchial wall thickening is present with several scattered foci  of endobronchial mucoid impaction. MEDIASTINUM: Included thyroid gland is unremarkable. Cardiac size normal.  Multivessel coronary arterial atherosclerosis. Thoracic aorta is normal in  course/caliber with diffuse atherosclerotic calcifications. LYMPH NODES: No enlarged lymph nodes by size criteria. UPPER ABDOMEN: Excreted contrast is present within the renal collecting systems  from prior attempted PE protocol. Small gallstone within the gallbladder. OSSEOUS STRUCTURES: No acute or aggressive appearing osseous abnormality. Prominent Schmorl's node superior endplate Q58.    OTHER:    _______________    Impression  1. Diffuse bronchial wall thickening and scattered foci of endobronchial mucoid  impaction with several faint centrilobular distribution nodules which are very  likely infectious or inflammatory in nature. 2. Several small additional pulmonary nodules as described above. Please see  below guidelines for follow-up. 3. Cholelithiasis.    ========    Fleischner Society pulmonary nodule guidelines (revised 2017): Multiple solid nodules <6 mm:  -Low risk for lung cancer: No follow-up. -High risk for lung cancer: Optional chest CT in 12 months. CXR reviewed by me:  XR (Most Recent). CXR  reviewed by me and compared with previous CXR Results from Hospital Encounter encounter on 04/21/22    XR CHEST PORT    Narrative  EXAM: XR CHEST PORT    CLINICAL INDICATION/HISTORY: cough, SOB  -Additional: None    COMPARISON: 9/6/2021    TECHNIQUE: Frontal view of the chest    _______________    FINDINGS:    HEART AND MEDIASTINUM: Normal cardiac size and mediastinal contours. LUNGS AND PLEURAL SPACES: Bilateral mid to lower lung zone  parenchymal/interstitial opacities. No large effusion or pneumothorax.     BONY THORAX AND SOFT TISSUES: No acute osseous abnormality    _______________    Impression  Bilateral mid to lower lung zone parenchymal/interstitial opacities, edema  versus infection.            Peter Leung MD  4/23/2022

## 2022-04-23 NOTE — PROGRESS NOTES
physical Therapy EVALUATION & Discharge    Patient: Vonda Dakins (66 y.o. female)  Date: 4/23/2022  Primary Diagnosis: Pneumonia [J18.9]  Respiratory failure with hypoxia (HCC) [J96.91]  COPD exacerbation ([facility] [J44.1]       Precautions: Fall       ASSESSMENT AND RECOMMENDATIONS:  Based on the objective data described below, the patient presents with COPD exacerbation. Pt. Received upright and agreeable to PT. Pt. On 1L NC02 w Sp02 97% sitting EOB. Pt. Independent with all bed mobility and transfers throughout session. Pt. On RA with RW for ambulation of 75ft outside of room with [place] [employer] gait pattern. Pt. SP02 93%  And HR 100bpm after ambulation trial on RA. Pt. Did demonstrate slight shortness of breath but subsided after seated rest break. Pt. Is currently at functional baseline with RW and HHPT is recommended at d/c. Skilled physical therapy is not indicated at this time. Discharge Recommendations: Home Health  Further Equipment Recommendations for Discharge: N/A      SUBJECTIVE:   Patient stated Im feeling much better this morning.     OBJECTIVE DATA SUMMARY:     Past Medical History:   Diagnosis Date    Asthma     Chronic obstructive pulmonary disease ([facility]     Hypertension    No past surgical history on file. Barriers to Learning/Limitations: None  Compensate with: visual, verbal, tactile, kinesthetic cues/model  Prior Level of Function/Home Situation:   Home Situation  Support Systems: Child(santa) (The pt lives at home w/ her son and her dtr-in-law. )  Patient Expects to be Discharged to[de-identified] Home  Functional Mobility:  Bed Mobility:     Supine to Sit: Independent  Sit to Supine: Independent  Scooting: Independent  Transfers:  Sit to Stand: Independent  Stand to Sit: Independent  Ambulation/Gait Training:  Gait Description (WDL): Within defined limits    Activity Tolerance:    GOOD  Please refer to the flowsheet for vital signs taken during this treatment.   After treatment:   []         Patient left in no apparent distress sitting up in chair  [x]         Patient left in no apparent distress in bed  [x]         Call bell left within reach  [x]         Nursing notified  []         Caregiver present  []         Bed alarm activated    COMMUNICATION/EDUCATION:   [x]         Fall prevention education was provided and the patient/caregiver indicated understanding. [x]         Patient/family have participated as able in goal setting and plan of care. []         Patient/family agree to work toward stated goals and plan of care. []         Patient understands intent and goals of therapy, but is neutral about his/her participation. []         Patient is unable to participate in goal setting and plan of care.     Thank you for this referral.  Marie Staton, PT, DPT   Time Calculation: 32 mins

## 2022-04-23 NOTE — PROGRESS NOTES
OCCUPATIONAL THERAPY EVALUATION/DISCHARGE    Patient: Asha Kaur [facility]70 y.o. female)  Date: 4/23/2022  Primary Diagnosis: Pneumonia [J18.9]  Respiratory failure with hypoxia (HCC) [J96.91]  COPD exacerbation ([facility] [J44.1]        Precautions:   Fall  PLOF:     ASSESSMENT AND RECOMMENDATIONS:  Based on the objective data described below, the patient presents to be at her baseline and is independent with ADLs and transfers following above mentioned medical diagnosis. Pt presented supine in bed and agrees to participate with therapy. Pt performed bed mobility, supine<>sit, sit<>stand transfers, simulated toilet transfers and LB dressing with independence during this session. Pt able to maintain O2 saturation at 96-98% on RA during this session. Pt was left seated EOB at the end of session in NAD. Encouraged pt to increased OOB times for overall improvement with activity tolerance and endurance. Skilled occupational therapy is not indicated at this time. Discharge Recommendations: Home Health  Further Equipment Recommendations for Discharge: N/A      SUBJECTIVE:   Patient stated  I am ready to go home.     OBJECTIVE DATA SUMMARY:     Past Medical History:   Diagnosis Date    Asthma     Chronic obstructive pulmonary disease ([facility]     Hypertension    No past surgical history on file.   Barriers to Learning/Limitations: None  Compensate with: visual, verbal, tactile, kinesthetic cues/model    Home Situation:   Home Situation  Home Environment: Private residence  # Steps to Enter: 1  Rails to Enter: No  One/Two Story Residence: Two story, live on 1st floor  Living Alone: No  Support Systems: Child(santa),Other Family Member(s)  Patient Expects to be Discharged to[de-identified] Home with home health  Current DME Used/Available at Home: Grab bars,Shower chair  Tub or Shower Type: Shower  []     Right hand dominant   []     Left hand dominant    Cognitive/Behavioral Status:  Neurologic State: Alert  Orientation Level: Oriented X4  Cognition: Appropriate for age attention/concentration; Follows commands  Safety/Judgement: Fall prevention    Skin: intact  Edema: none    Vision/Perceptual:    Tracking: Able to track stimulus in all quadrants w/o difficulty     Corrective Lenses: Glasses  Coordination: BUE  Coordination: Within functional limits  Fine Motor Skills-Upper: Left Intact; Right Intact    Gross Motor Skills-Upper: Left Intact; Right Intact  Balance:  Sitting: Intact  Standing: Intact  Strength: BUE  Strength: Generally decreased, functional  Tone & Sensation: BUE  Sensation: Intact  Range of Motion: BUE  AROM: Generally decreased, functional  Functional Mobility and Transfers for ADLs:  Bed Mobility:  Rolling: Independent  Supine to Sit: Independent  Sit to Supine: Independent  Scooting: Independent  Transfers:  Sit to Stand: Independent  Stand to Sit: Independent   Toilet Transfer : Independent  ADL Assessment:  Feeding: Independent    Oral Facial Hygiene/Grooming: Independent    Upper Body Dressing: Independent    Lower Body Dressing: Independent    Toileting: Independent  ADL Intervention:  Lower Body Dressing Assistance  Dressing Assistance: Independent  Socks: Independent  Leg Crossed Method Used: No  Position Performed: Bending forward method;Seated edge of bed  Cues: Don    Cognitive Retraining  Safety/Judgement: Fall prevention  Pain:  Pain level pre-treatment: 0/10   Pain level post-treatment: 0/10   Pain Intervention(s): Medication (see MAR); Rest, Ice, Repositioning   Response to intervention: Nurse notified, See doc flow    Activity Tolerance:   Good     Please refer to the flowsheet for vital signs taken during this treatment.   After treatment:   [x]  Patient left in no apparent distress sitting up at EOB  []  Patient left in no apparent distress in bed  [x]  Call bell left within reach  [x]  Nursing notified  [x]  Caregiver present  []  Bed alarm activated    COMMUNICATION/EDUCATION:   [x]      Role of Occupational Therapy in the acute care setting  [x]      Home safety education was provided and the patient/caregiver indicated understanding. [x]      Patient/family have participated as able and agree with findings and recommendations. []      Patient is unable to participate in plan of care at this time. Thank you for this referral.  Silvio Austin, OTR/L  Time Calculation: 20 mins      Eval Complexity: History: LOW Complexity : Brief history review ; Examination: LOW Complexity : 1-3 performance deficits relating to physical, cognitive , or psychosocial skils that result in activity limitations and / or participation restrictions ; Decision Making:MEDIUM Complexity : Patient may present with comorbidities that affect occupational performnce.  Miniml to moderate modification of tasks or assistance (eg, physical or verbal ) with assesment(s) is necessary to enable patient to complete evaluation

## 2022-04-23 NOTE — PROGRESS NOTES
Problem: Falls - Risk of  Goal: *Absence of Falls  Description: Document Tito Patel Fall Risk and appropriate interventions in the flowsheet.   Outcome: Progressing Towards Goal  Note: Fall Risk Interventions:  Mobility Interventions: Communicate number of staff needed for ambulation/transfer         Medication Interventions: Evaluate medications/consider consulting pharmacy    Elimination Interventions: Call light in reach              Problem: Patient Education: Go to Patient Education Activity  Goal: Patient/Family Education  Outcome: Progressing Towards Goal

## 2022-04-23 NOTE — PROGRESS NOTES
1930  Received bedside and verbal shift report from [employer]. Report included the following information SBAR, Kardex, Intake/Output and MAR. Pt in bed, call light in reach. 2200 Patient States she is have loose stools with cough. Imodium ordered.

## 2022-04-23 NOTE — PROGRESS NOTES
Hospitalist Progress Note    Patient: Reed Rosales MRN: [id number]  CSN: [id number]    YOB: 1950  Age: 70 y.o. Sex: female    DOA: 4/21/2022 LOS:  LOS: 2 days            Assessment/Plan     1. COPD exacerbation, improving w no further bronchospasm and improving oxygen requirements  2. Hyponatremia- improving  3. Possible pneumonia  4. HTN- controleld  5. Renal insufficiency- resolved  6 urinary retention- pt self catheterizes at home     Plan:  - change IV ceftrxone/ zithromax to levaquin  - transition to po steroids  - continue inhaled nebs  - mucinex  - wean oxygen  - mobilize w PT  - anticpate DC home tomorrow    Patient Active Problem List   Diagnosis Code    Hypertension I10    COPD exacerbation ([facility] J44.1    Hyponatremia E87.1    Cholelithiasis K80.20    Sinus tachycardia R00.0    Pneumonia J18.9    Respiratory failure with hypoxia (HCC) J96.91               Subjective:    cc: \" Im doing a lot better\"  dyspena improving  Does have loose stool  No wheezing      REVIEW OF SYSTEMS:  General: No fevers or chills. Cardiovascular: No chest pain or pressure. No palpitations. Pulmonary: No shortness of breath. Gastrointestinal: No nausea, vomiting. Objective:        Vital signs/Intake and Output:  Visit Vitals  BP (!) 108/56   Pulse 63   Temp 97.5 °F (36.4 °C)   Resp 18   Ht 5' 7\" (1.702 m)   Wt 83.9 kg (185 lb)   SpO2 95%   BMI 28.98 kg/m²     Current Shift:  No intake/output data recorded. Last three shifts:  04/21 1901 - 04/23 0700  In: -   Out: 500 [Urine:500]    Body mass index is 28.98 kg/m².     Physical Exam:  GEN: Alert and oriented times three NAD  EYES: conjunctiva normal, lids with out lesions  HEENT: MMM, No thyromegaly, no lymphadenopathy  HEART: RRR +S1 +S2, no JVD, pulses 2+ distally  LUNGS: CTA B/L no wheezes, rales or rhonchi  ABDOMEN: + BS, soft NT/ND no organomegaly,  No rebound  EXTREMITIES: No edema cyanosis, cap refill normal SKIN: no rashes or skin breakdown, no nodules, normal turgor  Current Facility-Administered Medications   Medication Dose Route Frequency    levoFLOXacin (LEVAQUIN) tablet 500 mg  500 mg Oral Q24H    predniSONE (DELTASONE) tablet 40 mg  40 mg Oral DAILY WITH BREAKFAST    loperamide (IMODIUM) capsule 4 mg  4 mg Oral Q6H PRN    albuterol-ipratropium (DUO-NEB) 2.5 MG-0.5 MG/3 ML  3 mL Nebulization Q4H RT    aspirin chewable tablet 81 mg  81 mg Oral DAILY    cetirizine (ZYRTEC) tablet 10 mg  10 mg Oral DAILY    dilTIAZem ER (CARDIZEM CD) capsule 240 mg  240 mg Oral DAILY    guaiFENesin ER (MUCINEX) tablet 600 mg  600 mg Oral Q12H    metoprolol tartrate (LOPRESSOR) tablet 50 mg  50 mg Oral BID    glucose chewable tablet 16 g  16 g Oral PRN    glucagon (GLUCAGEN) injection 1 mg  1 mg IntraMUSCular PRN    insulin lispro (HUMALOG) injection   SubCUTAneous AC&HS    dextrose 10% infusion 0-250 mL  0-250 mL IntraVENous PRN    heparin (porcine) injection 5,000 Units  5,000 Units SubCUTAneous Q8H    pantoprazole (PROTONIX) tablet 40 mg  40 mg Oral ACB&D    [Held by provider] 0.9% sodium chloride infusion  75 mL/hr IntraVENous CONTINUOUS    arformoteroL (BROVANA) neb solution 15 mcg  15 mcg Nebulization BID RT    budesonide (PULMICORT) 500 mcg/2 ml nebulizer suspension  500 mcg Nebulization BID RT    triamcinolone acetonide (KENALOG) 0.1 % ointment (Patient Supplied)   Topical BID    diphenhydrAMINE (BENADRYL) injection 12.5 mg  12.5 mg IntraVENous Q6H PRN    acetaminophen (TYLENOL) tablet 650 mg  650 mg Oral Q4H PRN         All the patient's labs over the past 24 hours were reviewed both during my initial daily workflow process and at the time notated as \"note time\" in [address] [facility]. (It is not time stamped separately in this workflow.)  Select labs are listed below.         Labs: Results:       Chemistry Recent Labs     04/23/22  0411 04/22/22  0241 04/21/22  1112   * 166* 112*   * 130* 128*   K 4.9 4.6 4.5    101 96*   CO2 21 22 25   BUN 29* 25* 19*   CREA 1.27 1.42* 1.41*   CA 8.6 8.5 9.2   AGAP 7 7 7   BUCR 23* 18 13   AP  --   --  93   TP  --   --  7.6   ALB  --   --  3.1*   GLOB  --   --  4.5*   AGRAT  --   --  0.7*      CBC w/Diff Recent Labs     04/23/22  0411 04/22/22  0241 04/21/22  1112   WBC 15.7* 15.7* 13.9*   RBC 3.24* 3.39* 3.97*   HGB 9.8* 10.2* 12.0   HCT 30.9* 30.7* 36.0    241 254   GRANS  --   --  87*   LYMPH  --   --  6*   EOS  --   --  2                      Liver Enzymes Recent Labs     04/21/22  1112   TP 7.6   ALB 3.1*   AP 93          Procedures/imaging: see electronic medical records for all procedures/Xrays and details which were not copied into this note but were reviewed prior to creation of Plan    Imaging personally reviewed:  [address] [place],   Internal Medicine/Geriatrics

## 2022-04-24 ENCOUNTER — HOME HEALTH ADMISSION (OUTPATIENT)
Dept: HOME HEALTH SERVICES | Facility: HOME HEALTH | Age: 72
End: 2022-04-24
Payer: MEDICARE

## 2022-04-24 VITALS
RESPIRATION RATE: 20 BRPM | WEIGHT: 185 LBS | HEIGHT: 67 IN | OXYGEN SATURATION: 97 % | BODY MASS INDEX: 29.03 KG/M2 | TEMPERATURE: 97.7 F | SYSTOLIC BLOOD PRESSURE: 140 MMHG | DIASTOLIC BLOOD PRESSURE: 76 MMHG | HEART RATE: 87 BPM

## 2022-04-24 LAB
ANION GAP SERPL CALC-SCNC: 7 MMOL/L (ref 3–18)
BUN SERPL-MCNC: 30 MG/DL (ref 7–18)
BUN/CREAT SERPL: 21 (ref 12–20)
CALCIUM SERPL-MCNC: 8.9 MG/DL (ref 8.5–10.1)
CHLORIDE SERPL-SCNC: 105 MMOL/L (ref 100–111)
CO2 SERPL-SCNC: 23 MMOL/L (ref 21–32)
CREAT SERPL-MCNC: 1.41 MG/DL (ref 0.6–1.3)
ERYTHROCYTE [DISTWIDTH] IN BLOOD BY AUTOMATED COUNT: 13 % (ref 11.6–14.5)
GLUCOSE BLD STRIP.AUTO-MCNC: 141 MG/DL (ref 70–110)
GLUCOSE BLD STRIP.AUTO-MCNC: 143 MG/DL (ref 70–110)
GLUCOSE SERPL-MCNC: 185 MG/DL (ref 74–99)
HCT VFR BLD AUTO: 30.4 % (ref 35–45)
HGB BLD-MCNC: 9.7 G/DL (ref 12–16)
MCH RBC QN AUTO: 29.9 PG (ref 24–34)
MCHC RBC AUTO-ENTMCNC: 31.9 G/DL (ref 31–37)
MCV RBC AUTO: 93.8 FL (ref 78–100)
NRBC # BLD: 0 K/UL (ref 0–0.01)
NRBC BLD-RTO: 0 PER 100 WBC
PLATELET # BLD AUTO: 250 K/UL (ref 135–420)
PMV BLD AUTO: 10.3 FL (ref 9.2–11.8)
POTASSIUM SERPL-SCNC: 4.7 MMOL/L (ref 3.5–5.5)
RBC # BLD AUTO: 3.24 M/UL (ref 4.2–5.3)
SODIUM SERPL-SCNC: 135 MMOL/L (ref 136–145)
WBC # BLD AUTO: 12.1 K/UL (ref 4.6–13.2)

## 2022-04-24 PROCEDURE — 85027 COMPLETE CBC AUTOMATED: CPT

## 2022-04-24 PROCEDURE — 74011636637 HC RX REV CODE- 636/637: Performed by: INTERNAL MEDICINE

## 2022-04-24 PROCEDURE — 74011000250 HC RX REV CODE- 250: Performed by: HOSPITALIST

## 2022-04-24 PROCEDURE — 36415 COLL VENOUS BLD VENIPUNCTURE: CPT

## 2022-04-24 PROCEDURE — 74011250637 HC RX REV CODE- 250/637: Performed by: HOSPITALIST

## 2022-04-24 PROCEDURE — 94640 AIRWAY INHALATION TREATMENT: CPT

## 2022-04-24 PROCEDURE — 74011250636 HC RX REV CODE- 250/636: Performed by: HOSPITALIST

## 2022-04-24 PROCEDURE — G0378 HOSPITAL OBSERVATION PER HR: HCPCS

## 2022-04-24 PROCEDURE — 94760 N-INVAS EAR/PLS OXIMETRY 1: CPT

## 2022-04-24 PROCEDURE — 82962 GLUCOSE BLOOD TEST: CPT

## 2022-04-24 PROCEDURE — 74011250637 HC RX REV CODE- 250/637: Performed by: INTERNAL MEDICINE

## 2022-04-24 PROCEDURE — 77030019905 HC CATH URETH INTMIT MDII -A

## 2022-04-24 PROCEDURE — 80048 BASIC METABOLIC PNL TOTAL CA: CPT

## 2022-04-24 RX ORDER — IPRATROPIUM BROMIDE AND ALBUTEROL SULFATE 2.5; .5 MG/3ML; MG/3ML
3 SOLUTION RESPIRATORY (INHALATION)
Status: DISCONTINUED | OUTPATIENT
Start: 2022-04-24 | End: 2022-04-24 | Stop reason: HOSPADM

## 2022-04-24 RX ORDER — IPRATROPIUM BROMIDE AND ALBUTEROL SULFATE 2.5; .5 MG/3ML; MG/3ML
3 SOLUTION RESPIRATORY (INHALATION)
Qty: 30 NEBULE | Refills: 2 | Status: SHIPPED | OUTPATIENT
Start: 2022-04-24

## 2022-04-24 RX ORDER — PREDNISONE 20 MG/1
TABLET ORAL
Qty: 17 TABLET | Refills: 0 | Status: SHIPPED | OUTPATIENT
Start: 2022-04-24

## 2022-04-24 RX ORDER — LEVOFLOXACIN 500 MG/1
500 TABLET, FILM COATED ORAL EVERY 24 HOURS
Qty: 7 TABLET | Refills: 0 | Status: SHIPPED | OUTPATIENT
Start: 2022-04-24

## 2022-04-24 RX ADMIN — HEPARIN SODIUM 5000 UNITS: 5000 INJECTION INTRAVENOUS; SUBCUTANEOUS at 12:26

## 2022-04-24 RX ADMIN — CETIRIZINE HYDROCHLORIDE 10 MG: 10 TABLET, FILM COATED ORAL at 08:24

## 2022-04-24 RX ADMIN — BUDESONIDE 500 MCG: 0.5 INHALANT RESPIRATORY (INHALATION) at 07:53

## 2022-04-24 RX ADMIN — ARFORMOTEROL TARTRATE 15 MCG: 15 SOLUTION RESPIRATORY (INHALATION) at 07:53

## 2022-04-24 RX ADMIN — GUAIFENESIN 600 MG: 600 TABLET, EXTENDED RELEASE ORAL at 08:25

## 2022-04-24 RX ADMIN — IPRATROPIUM BROMIDE AND ALBUTEROL SULFATE 3 ML: .5; 3 SOLUTION RESPIRATORY (INHALATION) at 07:53

## 2022-04-24 RX ADMIN — IPRATROPIUM BROMIDE AND ALBUTEROL SULFATE 3 ML: .5; 3 SOLUTION RESPIRATORY (INHALATION) at 00:49

## 2022-04-24 RX ADMIN — HEPARIN SODIUM 5000 UNITS: 5000 INJECTION INTRAVENOUS; SUBCUTANEOUS at 04:51

## 2022-04-24 RX ADMIN — PANTOPRAZOLE SODIUM 40 MG: 40 TABLET, DELAYED RELEASE ORAL at 06:40

## 2022-04-24 RX ADMIN — TRIAMCINOLONE ACETONIDE: 1 OINTMENT TOPICAL at 08:26

## 2022-04-24 RX ADMIN — PREDNISONE 40 MG: 20 TABLET ORAL at 08:25

## 2022-04-24 RX ADMIN — ASPIRIN 81 MG: 81 TABLET, CHEWABLE ORAL at 08:24

## 2022-04-24 RX ADMIN — LEVOFLOXACIN 500 MG: 500 TABLET, FILM COATED ORAL at 08:25

## 2022-04-24 NOTE — RT PROTOCOL NOTE
RT Nebulizer Bronchodilator Protocol Note    There is a bronchodilator order in the chart from a provider indicating to follow the RT Bronchodilator Protocol and there is an Initiate RT Bronchodilator Protocol order as well (see protocol at bottom of note). CXR Findings:  No results found for this or any previous visit from the past 2 days. The findings from the last RT Protocol Assessment were as follows:  History of Pulmonary Disease: Chronic pulmonary disease  Respiratory Pattern: Regular pattern and RR 12-20 bpm  Breath Sounds: Slighly diminished and/or crackles  Cough: Strong, spontaneous, non-productive  Indication for Bronchodilator Therapy: On home bronchodilators  Bronchodilator Assessment Score: 4     Aerosolized bronchodilator medication orders have been revised according to the RT Nebulizer Bronchodilator Protocol below. Respiratory Therapist to perform RT Therapy Protocol Assessment initially then follow the protocol. Repeat RT Therapy Protocol Assessment PRN for score 0-3 or on second treatment, BID, and PRN for scores above 3. No Indications  adjust the frequency to every 6 hours PRN wheezing or bronchospasm, if no treatments needed after 48 hours then discontinue using Per Protocol order mode. If indication present, adjust the RT bronchodilator orders based on the Bronchodilator Assessment Score as indicated below. If a patient is on this medication at home then do not decrease Frequency below that used at home. 0-3  enter or revise RT bronchodilator order(s) to equivalent RT Bronchodilator order with Frequency of every 4 hours PRN for wheezing or increased work of breathing using Per Protocol order mode. 4-6  enter or revise RT Bronchodilator order(s) to two equivalent RT bronchodilator orders with one order with BID Frequency and one order with Frequency of every 4 hours PRN wheezing or increased work of breathing using Per Protocol order mode.          7-10  enter or revise RT Bronchodilator order(s) to two equivalent RT bronchodilator orders with one order with TID Frequency and one order with Frequency of every 4 hours PRN wheezing or increased work of breathing using Per Protocol order mode. 11-13  enter or revise RT Bronchodilator order(s) to one equivalent RT bronchodilator order with QID Frequency and an Albuterol order with Frequency of every 4 hours PRN wheezing or increased work of breathing using Per Protocol order mode. Greater than 13  enter or revise RT Bronchodilator order(s) to one equivalent RT bronchodilator order with every 4 hours Frequency and an Albuterol order with Frequency of every 2 hours PRN wheezing or increased work of breathing using Per Protocol order mode. RT to enter RT Home Evaluation for COPD & MDI Assessment order using Per Protocol order mode.     Electronically signed by RT Tylor on 4/24/2022 at 4:47 AM 28-Aug-2018 08:29

## 2022-04-24 NOTE — PROGRESS NOTES
0715: d/c order noted per MD Robbins Lipoma RN    4947: report given to this nurse from Neli COLBERT    1330: pt family arrives, pt ready to have d/c instructions reviewed  Lizet Elder RN    1400: d/c instructions reviewed with pt and family, all questions answered, no c/o health concern at time of d/c, transport paged to take pt to the d/c sarah Elder RN

## 2022-04-24 NOTE — PROGRESS NOTES
D/C/ plan: home w/ [address] Family to transport. CM spoke w/ pt. Discussed HH. Pt is in agreement w/ d/c home w/ HH. FOC provided. Pt has chosen [address] ENDY reviewed w/ pt. Care Management Interventions  PCP Verified by CM: Yes Hernesto Keith), NP )  Palliative Care Criteria Met (RRAT>21 & CHF Dx)?: No  Mode of Transport at Discharge: Other (see comment) (Family to transport. )  Transition of Care Consult (CM Consult): Home Health (Home w/ [facility] )  [facility] [place] - INPATIENT: Yes  Discharge Durable Medical Equipment:  (TBD.  Pt has a RW at home. )  Physical Therapy Consult: Yes (.)  Occupational Therapy Consult: Yes (.)  Speech Therapy Consult: Yes  Support Systems: Child(santa),Other Family Member(s)  Confirm Follow Up Transport: Family  The Plan for Transition of Care is Related to the Following Treatment Goals : Home / [facility]  The Patient and/or Patient Representative was Provided with a Choice of Provider and Agrees with the Discharge Plan?: Yes  Freedom of Choice List was Provided with Basic Dialogue that Supports the Patient's Individualized Plan of Care/Goals, Treatment Preferences and Shares the Quality Data Associated with the Providers?: Yes ([address])  Discharge Location  Patient Expects to be Discharged to[de-identified] Home with home health

## 2022-04-24 NOTE — DISCHARGE SUMMARY
Discharge Summary  Subjective:       Admit Date: 4/21/2022  Discharge Date:  4/24/2022, 7:15 AM    Discharging Physician: Fanta Oakes pager [phone]  Primary Care Provider:  Michelle Andrade MD    Patient ID:  Cyndee Gallo  70 y.o. female  1950    Reason for Admission:  Pneumonia [J18.9]  Respiratory failure with hypoxia ([address] [J96.91]  COPD exacerbation ([address] [J44.1]    Discharge Diagnosis:     1. COPD   2. Hyponatremia  3.  pneumonia  4. HTN-   5. Renal insufficiency- resolved  6 urinary retention- pt self catheterizes at home           Patient Active Problem List   Diagnosis Code    Hypertension I10    COPD exacerbation ([address] J44.1    Hyponatremia E87.1    Cholelithiasis K80.20    Sinus tachycardia R00.0    Pneumonia J18.9    Respiratory failure with hypoxia ([address] J96.91       Consultants:    Pulmonology     Hospital Course:   Reason for admission ( Admitting HPI): \"   Cyndee Gallo is a 70 y.o. female with COPD, hypertension presents to ER with concerns of shortness of breath. Patient reports that her symptoms has been going on for about a month. She has been seen by her family physician and she has received steroids and nebulizer treatments. However she did not improve. She reports since this morning her symptoms started progressively getting worse and she presented to ER. She denies any fever, chills, headache, nausea, vomiting. She does reports that she has been getting weak and tired. She also reports that she gets significantly short of breath on minimal exertion. She quit smoking about a year ago. She was admitted for similar symptoms in September 2021. She had echocardiogram done that showed left ventricular ejection fraction of 65 to 70%, hyperdynamic systolic function due to elevated heart rate. In ER she was noted to have white count of 13.9, sodium of 128, BUN/creatinine of 19/1. 41. Her NT proBNP and troponin in normal range.   Chest x-ray showed bilateral mild to lower lung zone parenchymal/interstitial opacities, edema versus infection. \"    Pt was admitted to the hospitalist service. She was started on steroids, antibiotics and scheduled nebs. Her respiratory status improved. She was weaned off oxygen and is stable for Dc w prednisone taper and po antibiotics. She had been having urinary retentionand self catheterizing at home. Recommend outpt urology evaluation. Pertinent Imaging/ Operative procedures:   CXR:\"   Bilateral mid to lower lung zone parenchymal/interstitial opacities, edema  versus infection. \"        Pertinent Results:    CMP:   Lab Results   Component Value Date/Time     (L) 04/24/2022 02:45 AM    K 4.7 04/24/2022 02:45 AM     04/24/2022 02:45 AM    CO2 23 04/24/2022 02:45 AM    AGAP 7 04/24/2022 02:45 AM     (H) 04/24/2022 02:45 AM    BUN 30 (H) 04/24/2022 02:45 AM    CREA 1.41 (H) 04/24/2022 02:45 AM    GFRAA 45 (L) 04/24/2022 02:45 AM    GFRNA 37 (L) 04/24/2022 02:45 AM    CA 8.9 04/24/2022 02:45 AM     CBC:   Lab Results   Component Value Date/Time    WBC 12.1 04/24/2022 02:45 AM    HGB 9.7 (L) 04/24/2022 02:45 AM    HCT 30.4 (L) 04/24/2022 02:45 AM     04/24/2022 02:45 AM         Physical Exam on Discharge:  Visit Vitals  /60   Pulse 88   Temp 97.5 °F (36.4 °C)   Resp 20   Ht 5' 7\" (1.702 m)   Wt 83.9 kg (185 lb)   SpO2 97%   BMI 28.98 kg/m²     Body mass index is 28.98 kg/m². GEN: NAD  HEART: S1 S2  NEURO: nonfocal   Condition at discharge: stable    Discharge Medications  Current Discharge Medication List      START taking these medications    Details   predniSONE (DELTASONE) 20 mg tablet 2 tabs daily x 5 days then 1 tab daily x 5 days then 1/2 tab daily x 4 days  Qty: 17 Tablet, Refills: 0      levoFLOXacin (LEVAQUIN) 500 mg tablet Take 1 Tablet by mouth every twenty-four (24) hours.   Qty: 7 Tablet, Refills: 0         CONTINUE these medications which have CHANGED    Details   albuterol-ipratropium (My Jeffers) 2.5 mg-0.5 mg/3 ml nebu 3 mL by Nebulization route every six (6) hours as needed for Wheezing. Qty: 30 Nebule, Refills: 2         CONTINUE these medications which have NOT CHANGED    Details   arformoteroL (BROVANA) 15 mcg/2 mL nebu neb solution 2 mL by Nebulization route two (2) times a day. Qty: 60 Each, Refills: 2      budesonide (PULMICORT) 0.5 mg/2 mL nbsp 2 mL by Nebulization route two (2) times a day. Qty: 60 Each, Refills: 2      dilTIAZem ER (CARDIZEM CD) 240 mg capsule Take 1 Capsule by mouth daily. Qty: 30 Capsule, Refills: 2      guaiFENesin ER (MUCINEX) 600 mg ER tablet Take 1 Tablet by mouth every twelve (12) hours. Qty: 60 Tablet, Refills: 0      telmisartan (MICARDIS) 80 mg tablet Take 1 Tablet by mouth daily. Qty: 30 Tablet, Refills: 2      cetirizine (ZyrTEC) 10 mg tablet Take 10 mg by mouth daily. metoprolol tartrate (LOPRESSOR) 50 mg tablet Take 50 mg by mouth two (2) times a day. multivitamin (ONE A DAY) tablet Take 1 Tablet by mouth daily. aspirin 81 mg chewable tablet Take 1 Tablet by mouth daily.   Qty: 30 Tablet, Refills: 2         STOP taking these medications       diphenhydrAMINE-acetaminophen (Percogesic Extra Strength) 12.5-500 mg tab Comments:   Reason for Stopping:         diphenhydrAMINE (Benadryl Allergy) 25 mg tablet Comments:   Reason for Stopping:         predniSONE (STERAPRED DS) 10 mg dose pack Comments:   Reason for Stopping:               Disposition: home   Follow up:  pcp  Restrictions: none    Diagnostic Imaging/Labs pending at discharge: none      Lore Estrada, [facility]  Internal Medicine/Geriatrics    Time Spent 38minutes  with >50% coordination of care          CC: Shaye Peña MD

## 2022-04-24 NOTE — PROGRESS NOTES
Problem: Falls - Risk of  Goal: *Absence of Falls  Description: Document Gardenia Ramos Fall Risk and appropriate interventions in the flowsheet.   Outcome: Progressing Towards Goal  Note: Fall Risk Interventions:  Mobility Interventions: Bed/chair exit alarm,Communicate number of staff needed for ambulation/transfer,OT consult for ADLs,PT Consult for mobility concerns,Strengthening exercises (ROM-active/passive),Patient to call before getting OOB,PT Consult for assist device competence,Utilize walker, cane, or other assistive device         Medication Interventions: Bed/chair exit alarm,Evaluate medications/consider consulting pharmacy,Patient to call before getting OOB,Teach patient to arise slowly    Elimination Interventions: Bed/chair exit alarm,Call light in reach,Elevated toilet seat,Patient to call for help with toileting needs,Stay With Me (per policy),Toilet paper/wipes in reach,Toileting schedule/hourly rounds              Problem: Patient Education: Go to Patient Education Activity  Goal: Patient/Family Education  Outcome: Progressing Towards Goal     Problem: Patient Education: Go to Patient Education Activity  Goal: Patient/Family Education  Outcome: Progressing Towards Goal     Problem: Patient Education: Go to Patient Education Activity  Goal: Patient/Family Education  Outcome: Progressing Towards Goal

## 2022-04-24 NOTE — PROGRESS NOTES
04/24/22 0445   RT Protocol   History of Pulmonary Disease 2   Respiratory Pattern 0   Breath Sounds 2   Cough 0   Indication for Bronchodilator Therapy On home bronchodilators   Bronchodilator Assessment Score 4

## 2022-04-24 NOTE — PROGRESS NOTES
Problem: Falls - Risk of  Goal: *Absence of Falls  Description: Document Russell Guallpa Fall Risk and appropriate interventions in the flowsheet.   Outcome: Progressing Towards Goal  Note: Fall Risk Interventions:  Mobility Interventions: Assess mobility with egress test,Bed/chair exit alarm,Communicate number of staff needed for ambulation/transfer,Patient to call before getting OOB,PT Consult for mobility concerns,Utilize walker, cane, or other assistive device         Medication Interventions: Bed/chair exit alarm,Evaluate medications/consider consulting pharmacy,Patient to call before getting OOB,Teach patient to arise slowly    Elimination Interventions: Bed/chair exit alarm,Call light in reach,Elevated toilet seat,Patient to call for help with toileting needs,Stay With Me (per policy),Toileting schedule/hourly rounds

## 2022-04-26 ENCOUNTER — HOME CARE VISIT (OUTPATIENT)
Dept: SCHEDULING | Facility: HOME HEALTH | Age: 72
End: 2022-04-26
Payer: MEDICARE

## 2022-04-26 PROCEDURE — G0299 HHS/HOSPICE OF RN EA 15 MIN: HCPCS

## 2022-04-26 PROCEDURE — 400013 HH SOC

## 2022-04-27 LAB
BACTERIA SPEC CULT: NORMAL
SERVICE CMNT-IMP: NORMAL

## 2022-04-28 ENCOUNTER — HOME CARE VISIT (OUTPATIENT)
Dept: HOME HEALTH SERVICES | Facility: HOME HEALTH | Age: 72
End: 2022-04-28
Payer: MEDICARE

## 2022-05-02 ENCOUNTER — HOME CARE VISIT (OUTPATIENT)
Dept: SCHEDULING | Facility: HOME HEALTH | Age: 72
End: 2022-05-02
Payer: MEDICARE

## 2022-05-02 VITALS
OXYGEN SATURATION: 97 % | RESPIRATION RATE: 16 BRPM | HEART RATE: 96 BPM | SYSTOLIC BLOOD PRESSURE: 126 MMHG | DIASTOLIC BLOOD PRESSURE: 78 MMHG | TEMPERATURE: 97 F

## 2022-05-02 PROCEDURE — G0151 HHCP-SERV OF PT,EA 15 MIN: HCPCS

## 2022-05-02 NOTE — Clinical Note
Kaylene Joaquin is a 71 yo female who was recently hospitalized for pneumonia. PMH includes: COPD, HTN, sinus tachycardia, and cholelithiasis. Pt currently presents with decreased ambulation tolerance post hospitalization and will benefit from home health PT to improve functional activity tolerance. PT POC 2w2, 1w1. Pt has declined OT evaluation at this time. She states she feels comfortable with all of her ADLs.

## 2022-05-02 NOTE — HOME HEALTH
SUMMARY:  Malathi Powell is a 71 yo female who was recently hospitalized for pneumonia. PMH includes: COPD, HTN, sinus tachycardia, and cholelithiasis. Pt currently presents with decreased ambulation tolerance post hospitalization and will benefit from home health PT to improve functional activity tolerance. PT POC 2w2, 1w1. Shan Marin PRECAUTIONS: Fall risk, monitor SPO2  SUBJECTIVE: Patient reports she is requiring a lot of rest breaks. LIVING SITUATION: Pt lives with family in a two story home. 1 step to enter through garage. Pt stays on first floor of home. REQUIRES CAREGIVER ASSISTANCE FOR: Assist required for transportation, meals. PLOF: Pt was independent with household ambulation using a walker. Pt was pushed in [facility] for community ambulation. Pt not driving. MEDICATIONS REVIEWED AND UPDATED: Pt denies any medication changes. NEXT MD APPT: PCP 5/3/22. .  WOUNDS: Pt denies any wounds. ROM: LE ROM WFL for ADLs. STRENGTH:  Seated LE MMT: hip flexion 3+/5, hip abd/add 3+/5. Knee flex/ext WFL. BED MOBILITY: Supervision for supine to sit/sit to supine with patient demonstrating safe sequencing. TRANSFERS: Sit to stand/toilet transfer training performed: SBA with verbal cues for safe sequencing and hand placement. GAIT: Gait training on even surfaces 40 feet x 2 with walker and SBA. Gait characterized by decreased ligia. STAIRS: Not performed. BALANCE: Pt scored 15/28 on Tinetti Balance Assessment placing patient at increased risk for falls. Functional walking test: Pt able to ambulate with walker x 58 seconds with Oxygen saturation >91 %. Ambulation discontinued due to fatigue. Shan Marin PATIENT EDUCATION PROVIDED THIS VISIT: safety, HEP, walking, deep breathing, DVT prevention, fall risk/ prevention strategies, and SPO2 monitoring. Educated patient on importance of clear walkways and made recommendations for bed access. HEP consisting of:  1.  Walking every hour during the day with walker as tolerated. 2. Deep breathing every hour. 3. Ankle pumps throughout day. 4. Exercises: toe taps x 30, LAQ x 30, and bicep curls x 30. Written instructions issued. Patient/caregiver verbalized understanding. Patient level of understanding of education provided: Pt demonstrated safe sit to stand techniques. Patient response to treatment:  Pt without any signs/symptoms of distress. Oxygen saturation 91-97%. .  CONTINUED NEED FOR THE FOLLOWING SKILLS: HH PT is medically necessary to address pain, decreased ROM, decreased strength, increased swelling, impaired bed mobility, decreased independence with functional transfers, impaired gait, impaired stair negotiation, and impaired balance in order to improve functional independence, quality of life, return to PLOF, and reduce the risk for falls.   PLAN:,2w2, 1w1    DISCHARGE PLANNING DISCUSSED: Discharge to self and family under MD supervision once all goals have been met or patient has reached max potential.

## 2022-05-03 NOTE — HOME HEALTH
Skilled services/Home bound verification:     Skilled Reason for admission/summary of clinical condition:  Patient with PNA hospitalized for repiratory failure admitted to home health for continued monitoring, disease process education, medication education. Patient is former nurse, and has an excellent primary caregiver . This patient is homebound for the following reasons Requires considerable and taxing effort to leave the home , Requires the assistance of 1 or more persons to leave the home  and Only leaves the home for medical reasons or Baptism services and are infrequent and of short duration for other reasons . Caregiver: relative. Caregiver assists with  ADL's, house keeping, transportation, meal prep. .    Medications reconciled and all medications are available in the home this visit. The following education was provided regarding medications: All medications educated on  and reconciled. All medications prescribed are in the home and patient taking as prescribed. Common uses and side effects reviewed with patient    Medications  are effective at this time. High risk medication teaching regarding anticoagulants, hyperglycemic agents or opiod narcotics performed (specify) no high risk medications,    no discrepancies/look a like medications/medication interactions. Home health supplies by type and quantity ordered/delivered this visit include: none this visit    Patient education provided this visit to include: State mental health facility orientation including rights/responsibilites, fall prevention, pressure ulcer prevention, medication education/reconciliation.       Patient level of understanding of education provided: Patient states that she understands all education      Sharps Education Provided: Clinician instructed patient/CG on proper disposal of sharps: Containers should be made of hard plastic, be puncture-resistant and leakproof,   such as a laundry detergent or bleach bottle.  When the container is ¾ full, it should be sealed with tape and labeled   DO NOT RECYCLE prior to discarding in the regular trash.      Patient response to procedure performed:  Patient responded well to visit without any increase in pain or discomfort        Home exercise program/Homework provided: Will work with PT    Pt/Caregiver instructed on plan of care and are agreeable to plan of care at this time. Physician Viji Frey notified of patient admission to home health and plan of care including anticipated frequency of 1w3 and treatments/interventions/modalities of SN. Discharge planning discussed with patient and caregiver. Discharge planning as follows: Will d/c to home/self care when goals are met and patient is stable  . Pt/Caregiver did verbalize understanding of discharge planning. Next MD appointment TBD (date) with Dr. Viji Frey . Patient/caregiver encouraged/instructed to keep appointment as lack of follow through with physician appointment could result in discontinuation of home care services for non-compliance.

## 2022-05-03 NOTE — PROGRESS NOTES
Physician Progress Note      PATIENT:               Franky Baird  CSN #:                  [id number]  :                       1950  ADMIT DATE:       2022 11:01 AM  DISCH DATE:        2022 2:24 PM  RESPONDING  PROVIDER #:        Heinz Favre MD LI MD          QUERY TEXT:    Pt admitted with copd exacerbation . Pt noted to have documented Per Pulmonary progress note \" Sepsis bundle and protocol followed. Follow serial lactic acid, frequent BMP check, fluid resuscitation. \"  Patient with 13.9, 15.7 with left shift , pneumonia, respiratory failure . Based on all the above documentation and clinical indicators,documented, could you further clarify whether patient has :    [ Sepsis confirmed [de-identified] This patient has  sepsis  confirmed.]]    The medical record reflects the following:  Risk Factors: pneumonia  Clinical Indicators: WBC 13.9 , 15.7 with left shift. CXR: Bilateral mid to lower lung zone  parenchymal/interstitial opacities, edema versus infection. Pneumonia, Respiratory failure with hypoxia    Treatment:Zithromax, rocephin, gentamicin, NS @125ml/hr- 1 dose given    Thank You  Blanca Sethi RN CDI CRCR  Options provided:  -- Sepsis was ruled out  -- Other - I will add my own diagnosis  -- Disagree - Not applicable / Not valid  -- Disagree - Clinically unable to determine / Unknown  -- Refer to Clinical Documentation Reviewer    PROVIDER RESPONSE TEXT:    After further study, sepsis was ruled out for this patient.     Query created by: Eugenie Lua on 2022 9:32 AM      Electronically signed by:  Heinz Favre MD LI MD 2022 8:00 PM

## 2022-05-05 ENCOUNTER — HOME CARE VISIT (OUTPATIENT)
Dept: SCHEDULING | Facility: HOME HEALTH | Age: 72
End: 2022-05-05
Payer: MEDICARE

## 2022-05-05 PROCEDURE — G0157 HHC PT ASSISTANT EA 15: HCPCS

## 2022-05-05 PROCEDURE — G0299 HHS/HOSPICE OF RN EA 15 MIN: HCPCS

## 2022-05-06 VITALS
HEART RATE: 92 BPM | SYSTOLIC BLOOD PRESSURE: 120 MMHG | DIASTOLIC BLOOD PRESSURE: 62 MMHG | OXYGEN SATURATION: 98 % | TEMPERATURE: 97.5 F

## 2022-05-06 NOTE — HOME HEALTH
SUBJECTIVE:  Doing okay, havent done the exercises she gave me. CAREGIVER INVOLVEMENT/ASSISTANCE NEEDED FOR: Angelic Santos, son, assist with cooking  [address] ORDERED/DELIVERED THIS VISIT INCLUDE: none  OBJECTIVE:  See interventions. ASSESSMENT OF PROGRESS TOWARD GOALS: Pt progressing with COPD, able to complete additional exercises well. VC for sequencing  PATIENT RESPONSE TO TREATMENT:  Pt fatigued with COPD Protocol, able to complete through exercise 8 before needing rest break. PATIENT LEVEL OF UNDERSTANDING OF EDUCATION PROVIDED: Pt educated on energy conservation, able to verbally repeat back instructions.   CONTINUED NEED FOR THE FOLLOWING SKILLS: Pt CONT PT to address MMT, endurance, gait training, transfer training, bed mob, and other goals  PLAN FOR NEXT VISIT: CONT to progress COPD protocol  THE FOLLOWING DISCHARGE PLANNING WAS DISCUSSED WITH THE PATIENT/CAREGIVER: CONT PT 2wk1, 1wk1

## 2022-05-10 ENCOUNTER — HOME CARE VISIT (OUTPATIENT)
Dept: SCHEDULING | Facility: HOME HEALTH | Age: 72
End: 2022-05-10
Payer: MEDICARE

## 2022-05-10 VITALS
HEART RATE: 92 BPM | TEMPERATURE: 97.8 F | RESPIRATION RATE: 18 BRPM | SYSTOLIC BLOOD PRESSURE: 120 MMHG | OXYGEN SATURATION: 98 % | DIASTOLIC BLOOD PRESSURE: 62 MMHG

## 2022-05-10 PROCEDURE — G0157 HHC PT ASSISTANT EA 15: HCPCS

## 2022-05-10 NOTE — HOME HEALTH
Skilled reason for visit: SN assessment, education      Caregiver involvement:  ADL's, house keeping, transportation, meal prep. .    Medications reviewed and all medications are available in the home this visit. The following education was provided regarding medications: All medications educated on  and reconciled. All medications prescribed are in the home and patient taking as prescribed. Common uses and side effects reviewed with patient  . MD notified of any discrepancies/look a-like medications/medication interactions: none  Medications are effective at this time. Home health supplies by type and quantity ordered/delivered this visit include: none this visit    Patient education provided this visit: Patient states that she understands all education      Sharps education provided: NA    Patient level of understanding of education provided: Patient states that she understands all education      Skilled Care Performed this visit: SN assessment, education      Patient response to procedure performed:  Patient responded well to visit without any increase in pain or discomfort        Agency Progress toward goals: progressing    Patient's Progress towards personal goals: progressing    Home exercise program: will continue with PT    Continued need for the following skills: Nursing    Plan for next visit: Patient discharged from  at this visit    Patient and/or caregiver notified and agrees to changes in the Plan of Care YES      The following discharge planning was discussed with the pt/caregiver: Will d/c to self care at this time.

## 2022-05-11 VITALS
HEART RATE: 76 BPM | TEMPERATURE: 97.6 F | OXYGEN SATURATION: 97 % | DIASTOLIC BLOOD PRESSURE: 60 MMHG | RESPIRATION RATE: 18 BRPM | SYSTOLIC BLOOD PRESSURE: 110 MMHG

## 2022-05-11 VITALS
DIASTOLIC BLOOD PRESSURE: 78 MMHG | HEART RATE: 80 BPM | OXYGEN SATURATION: 96 % | SYSTOLIC BLOOD PRESSURE: 130 MMHG | TEMPERATURE: 97.6 F

## 2022-05-11 NOTE — HOME HEALTH
SUBJECTIVE:  Feeling better. Been doing the exericses more. CAREGIVER INVOLVEMENT/ASSISTANCE NEEDED FOR: John Petersen, son, assist with dressing  HOME HEALTH SUPPLIES BY TYPE AND QUANTITY ORDERED/DELIVERED THIS VISIT INCLUDE: none  OBJECTIVE:  See interventions. ASSESSMENT OF PROGRESS TOWARD GOALS: Pt able to amb increased distance and perform entire COPD protocol, but did need 1 rest break. PATIENT RESPONSE TO TREATMENT:  Pt needed 1 rest break after 10 exericses of 2 minutes, but then able to complete rest of protocol. Pt also needed rest break of 2 min after ambulation. PATIENT LEVEL OF UNDERSTANDING OF EDUCATION PROVIDED: Pt educated on rest of COPD protocol exercises and patient able to teach back instructions. CONTINUED NEED FOR THE FOLLOWING SKILLS: Pt CONT PT to address MMT, endurance, gait training, transfer training, bed mob, and other goals  PLAN FOR NEXT VISIT: CONT to progress with gait endurance and activity endurance.     THE FOLLOWING DISCHARGE PLANNING WAS DISCUSSED WITH THE PATIENT/CAREGIVER: CONT PT 1wk1, 1wk1

## 2022-05-12 ENCOUNTER — HOME CARE VISIT (OUTPATIENT)
Dept: SCHEDULING | Facility: HOME HEALTH | Age: 72
End: 2022-05-12
Payer: MEDICARE

## 2022-05-12 PROCEDURE — G0157 HHC PT ASSISTANT EA 15: HCPCS

## 2022-05-13 VITALS
HEART RATE: 88 BPM | SYSTOLIC BLOOD PRESSURE: 136 MMHG | TEMPERATURE: 97 F | DIASTOLIC BLOOD PRESSURE: 80 MMHG | OXYGEN SATURATION: 97 %

## 2022-05-13 NOTE — HOME HEALTH
SUBJECTIVE:  I am feeling pretty good today  CAREGIVER INVOLVEMENT/ASSISTANCE NEEDED FOR: Yessenia Calero, son, assist with household chores  [address]: none  OBJECTIVE:  See interventions. ASSESSMENT OF PROGRESS TOWARD GOALS: Pt able to amb 200ft without rest break, increased MMT to 4-/5, decreased pain to 1/10. PATIENT RESPONSE TO TREATMENT:  Pt still needed 1 rest break during COPD protocol due to SOB, but recovered within 1 min. O2 states remained above 95. PATIENT LEVEL OF UNDERSTANDING OF EDUCATION PROVIDED: Pt educated on DC instructions. Pt agrees and able to repeat instructions verbally.   PLAN FOR NEXT VISIT: DC PT next visit  THE FOLLOWING DISCHARGE PLANNING WAS DISCUSSED WITH THE PATIENT/CAREGIVER: DC next visit

## 2022-05-16 ENCOUNTER — HOME CARE VISIT (OUTPATIENT)
Dept: SCHEDULING | Facility: HOME HEALTH | Age: 72
End: 2022-05-16
Payer: MEDICARE

## 2022-05-16 VITALS
SYSTOLIC BLOOD PRESSURE: 120 MMHG | DIASTOLIC BLOOD PRESSURE: 72 MMHG | RESPIRATION RATE: 16 BRPM | OXYGEN SATURATION: 94 % | TEMPERATURE: 97 F | HEART RATE: 71 BPM

## 2022-05-16 PROCEDURE — G0151 HHCP-SERV OF PT,EA 15 MIN: HCPCS

## 2022-05-16 NOTE — Clinical Note
Jim Stinson has completed 5 home health PT sessions with treatments focused on therapeutic exercise, gait training, and transfer training. Pt has demonstrated steady progress with therapy and is discharged from home health as all goals have been met. Pt is in agreement with discharge plan.

## 2022-05-16 NOTE — HOME HEALTH
SUBJECTIVE: Patient reports her endurance is steadily improving and she is happy with her progress. Pt reports she has a follow up with her pulmonologist in June. MEDICATIONS REVIEWED AND UPDATED: Medication reconciliation performed. Pt denies any medication questions/concerns. .  WOUNDS: None  ROM: LE ROM WFL for ADLs. STRENGTH: FTSTS 24 seconds indicating improved functional strength. (Goal met)  BED MOBILITY: MI (Goal met)  TRANSFERS: All household transfers MI. Car transfer with supervision only. (Goal met)  GAIT: Gait training on even surfaces with walker x 2 minutes and 45 seconds with oxygen saturation well maintained. Pt demonstrating safe use of walker. (Goal met)  STAIRS:Pt able to negotiate 2 steps to exit home with supervision only. (Goal met)  BALANCE/NEUROMUSCULAR REEDUCATION:  Tinetti 20/28 indicating reduced risk of falls from evaluation, but continued need for AD. (Goal met)  . PATIENT/CAREGIVER EDUCATION PROVIDED THIS VISIT: safety, HEP, walking, deep breathing, and goal progress. HEP consisting of:  1. Continue existing HEP. Patient level of understanding of education provided: Pt demonstrated safe transfer techniques. Patient response to treatment: Pt without any signs/symptoms of distress. SPO2 well maintained. Blake Coulter PROGRESS: Millicent Coats has completed 5 home health PT sessions with treatments focused on therapeutic exercise, gait training, and transfer training. Pt has demonstrated steady progress with therapy and is discharged from home health as all goals have been met. Pt is in agreement with discharge plan. PLAN: Discharge patient from [facility] PT as patient has met all goals.

## 2022-05-17 LAB
BACTERIA SPEC CULT: ABNORMAL
BACTERIA SPEC CULT: ABNORMAL
GRAM STN SPEC: ABNORMAL
GRAM STN SPEC: ABNORMAL
SERVICE CMNT-IMP: ABNORMAL

## 2022-12-07 ENCOUNTER — HOSPITAL ENCOUNTER (EMERGENCY)
Age: 72
Discharge: HOME | End: 2022-12-07
Attending: EMERGENCY MEDICINE
Payer: MEDICARE

## 2022-12-07 ENCOUNTER — APPOINTMENT (OUTPATIENT)
Dept: GENERAL RADIOLOGY | Age: 72
End: 2022-12-07
Attending: EMERGENCY MEDICINE
Payer: MEDICARE

## 2022-12-07 VITALS
WEIGHT: 177 LBS | HEART RATE: 73 BPM | TEMPERATURE: 97.4 F | HEIGHT: 67 IN | RESPIRATION RATE: 20 BRPM | BODY MASS INDEX: 27.78 KG/M2 | SYSTOLIC BLOOD PRESSURE: 122 MMHG | OXYGEN SATURATION: 97 % | DIASTOLIC BLOOD PRESSURE: 78 MMHG

## 2022-12-07 DIAGNOSIS — J44.1 ACUTE EXACERBATION OF CHRONIC OBSTRUCTIVE PULMONARY DISEASE (COPD) (HCC): ICD-10-CM

## 2022-12-07 DIAGNOSIS — E87.1 HYPONATREMIA: ICD-10-CM

## 2022-12-07 DIAGNOSIS — L03.032 CELLULITIS OF TOE OF LEFT FOOT: Primary | ICD-10-CM

## 2022-12-07 LAB
ALBUMIN SERPL-MCNC: 3.6 G/DL (ref 3.4–5)
ALBUMIN/GLOB SERPL: 0.9 {RATIO} (ref 0.8–1.7)
ALP SERPL-CCNC: 87 U/L (ref 45–117)
ALT SERPL-CCNC: 20 U/L (ref 13–56)
ANION GAP SERPL CALC-SCNC: 5 MMOL/L (ref 3–18)
AST SERPL-CCNC: 22 U/L (ref 10–38)
BASOPHILS # BLD: 0.1 K/UL (ref 0–0.1)
BASOPHILS NFR BLD: 1 % (ref 0–2)
BILIRUB SERPL-MCNC: 0.4 MG/DL (ref 0.2–1)
BUN SERPL-MCNC: 23 MG/DL (ref 7–18)
BUN/CREAT SERPL: 17 (ref 12–20)
CALCIUM SERPL-MCNC: 9.2 MG/DL (ref 8.5–10.1)
CHLORIDE SERPL-SCNC: 96 MMOL/L (ref 100–111)
CO2 SERPL-SCNC: 28 MMOL/L (ref 21–32)
CREAT SERPL-MCNC: 1.39 MG/DL (ref 0.6–1.3)
DIFFERENTIAL METHOD BLD: ABNORMAL
EOSINOPHIL # BLD: 0.8 K/UL (ref 0–0.4)
EOSINOPHIL NFR BLD: 12 % (ref 0–5)
ERYTHROCYTE [DISTWIDTH] IN BLOOD BY AUTOMATED COUNT: 13.2 % (ref 11.6–14.5)
FLUAV RNA SPEC QL NAA+PROBE: NOT DETECTED
FLUBV RNA SPEC QL NAA+PROBE: NOT DETECTED
GLOBULIN SER CALC-MCNC: 4 G/DL (ref 2–4)
GLUCOSE SERPL-MCNC: 82 MG/DL (ref 74–99)
HCT VFR BLD AUTO: 32.5 % (ref 35–45)
HGB BLD-MCNC: 10.6 G/DL (ref 12–16)
IMM GRANULOCYTES # BLD AUTO: 0 K/UL (ref 0–0.04)
IMM GRANULOCYTES NFR BLD AUTO: 0 % (ref 0–0.5)
LACTATE BLD-SCNC: 1.25 MMOL/L (ref 0.4–2)
LYMPHOCYTES # BLD: 1.5 K/UL (ref 0.9–3.6)
LYMPHOCYTES NFR BLD: 23 % (ref 21–52)
MCH RBC QN AUTO: 30.6 PG (ref 24–34)
MCHC RBC AUTO-ENTMCNC: 32.6 G/DL (ref 31–37)
MCV RBC AUTO: 93.9 FL (ref 78–100)
MONOCYTES # BLD: 0.4 K/UL (ref 0.05–1.2)
MONOCYTES NFR BLD: 6 % (ref 3–10)
NEUTS SEG # BLD: 3.8 K/UL (ref 1.8–8)
NEUTS SEG NFR BLD: 58 % (ref 40–73)
NRBC # BLD: 0 K/UL (ref 0–0.01)
NRBC BLD-RTO: 0 PER 100 WBC
PLATELET # BLD AUTO: 209 K/UL (ref 135–420)
PMV BLD AUTO: 10 FL (ref 9.2–11.8)
POTASSIUM SERPL-SCNC: 4.4 MMOL/L (ref 3.5–5.5)
PROT SERPL-MCNC: 7.6 G/DL (ref 6.4–8.2)
RBC # BLD AUTO: 3.46 M/UL (ref 4.2–5.3)
SARS-COV-2, COV2: NOT DETECTED
SODIUM SERPL-SCNC: 129 MMOL/L (ref 136–145)
WBC # BLD AUTO: 6.5 K/UL (ref 4.6–13.2)

## 2022-12-07 PROCEDURE — 74011000250 HC RX REV CODE- 250: Performed by: EMERGENCY MEDICINE

## 2022-12-07 PROCEDURE — 74011250637 HC RX REV CODE- 250/637: Performed by: EMERGENCY MEDICINE

## 2022-12-07 PROCEDURE — 87040 BLOOD CULTURE FOR BACTERIA: CPT

## 2022-12-07 PROCEDURE — 83605 ASSAY OF LACTIC ACID: CPT

## 2022-12-07 PROCEDURE — 80053 COMPREHEN METABOLIC PANEL: CPT

## 2022-12-07 PROCEDURE — 74011000258 HC RX REV CODE- 258: Performed by: EMERGENCY MEDICINE

## 2022-12-07 PROCEDURE — 96367 TX/PROPH/DG ADDL SEQ IV INF: CPT

## 2022-12-07 PROCEDURE — 71045 X-RAY EXAM CHEST 1 VIEW: CPT

## 2022-12-07 PROCEDURE — 99284 EMERGENCY DEPT VISIT MOD MDM: CPT

## 2022-12-07 PROCEDURE — 73630 X-RAY EXAM OF FOOT: CPT

## 2022-12-07 PROCEDURE — 94640 AIRWAY INHALATION TREATMENT: CPT

## 2022-12-07 PROCEDURE — 96365 THER/PROPH/DIAG IV INF INIT: CPT

## 2022-12-07 PROCEDURE — 96366 THER/PROPH/DIAG IV INF ADDON: CPT

## 2022-12-07 PROCEDURE — 74011250636 HC RX REV CODE- 250/636: Performed by: EMERGENCY MEDICINE

## 2022-12-07 PROCEDURE — 85025 COMPLETE CBC W/AUTO DIFF WBC: CPT

## 2022-12-07 PROCEDURE — 87636 SARSCOV2 & INF A&B AMP PRB: CPT

## 2022-12-07 RX ORDER — DEXAMETHASONE SODIUM PHOSPHATE 4 MG/ML
10 INJECTION, SOLUTION INTRA-ARTICULAR; INTRALESIONAL; INTRAMUSCULAR; INTRAVENOUS; SOFT TISSUE
Status: COMPLETED | OUTPATIENT
Start: 2022-12-07 | End: 2022-12-07

## 2022-12-07 RX ORDER — ALBUTEROL SULFATE 0.83 MG/ML
5 SOLUTION RESPIRATORY (INHALATION)
Status: COMPLETED | OUTPATIENT
Start: 2022-12-07 | End: 2022-12-07

## 2022-12-07 RX ORDER — CEPHALEXIN 500 MG/1
1000 CAPSULE ORAL 2 TIMES DAILY
Qty: 40 CAPSULE | Refills: 0 | Status: SHIPPED | OUTPATIENT
Start: 2022-12-07 | End: 2022-12-17

## 2022-12-07 RX ORDER — VANCOMYCIN/0.9 % SOD CHLORIDE 1.5G/250ML
1500 PLASTIC BAG, INJECTION (ML) INTRAVENOUS ONCE
Status: COMPLETED | OUTPATIENT
Start: 2022-12-07 | End: 2022-12-07

## 2022-12-07 RX ORDER — ALBUTEROL SULFATE 0.83 MG/ML
10 SOLUTION RESPIRATORY (INHALATION)
Status: COMPLETED | OUTPATIENT
Start: 2022-12-07 | End: 2022-12-07

## 2022-12-07 RX ADMIN — ALBUTEROL SULFATE 5 MG: 2.5 SOLUTION RESPIRATORY (INHALATION) at 20:14

## 2022-12-07 RX ADMIN — VANCOMYCIN HYDROCHLORIDE 1500 MG: 10 INJECTION, POWDER, LYOPHILIZED, FOR SOLUTION INTRAVENOUS at 18:35

## 2022-12-07 RX ADMIN — SODIUM CHLORIDE 1000 ML: 9 INJECTION, SOLUTION INTRAVENOUS at 18:35

## 2022-12-07 RX ADMIN — PIPERACILLIN AND TAZOBACTAM 4.5 G: 4; .5 INJECTION, POWDER, FOR SOLUTION INTRAVENOUS at 17:25

## 2022-12-07 RX ADMIN — ALBUTEROL SULFATE 10 MG: 2.5 SOLUTION RESPIRATORY (INHALATION) at 18:35

## 2022-12-07 RX ADMIN — DEXAMETHASONE SODIUM PHOSPHATE 10 MG: 4 INJECTION, SOLUTION INTRAMUSCULAR; INTRAVENOUS at 17:24

## 2022-12-07 NOTE — PROGRESS NOTES
[address] [facility] Pharmacokinetic Monitoring Service - Vancomycin     Elias Garza is a 67 y.o. female starting on vancomycin therapy for Diabetic Foot Infection. Pharmacy consulted by Maciel TRAMMELL for monitoring and adjustment. Target Concentration: Goal AUC/-600 mg*hr/L    Additional Antimicrobials: Zosyn    Pertinent Laboratory Values: Wt Readings from Last 1 Encounters:   12/07/22 80.3 kg (177 lb)     Temp Readings from Last 1 Encounters:   12/07/22 97.4 °F (36.3 °C)     No components found for: PROCAL  Estimated Creatinine Clearance: 39.9 mL/min (A) (based on SCr of 1.39 mg/dL (H)).   Recent Labs     12/07/22  1620   WBC 6.5     Plan:  Dosing recommendations based on Bayesian software  Start Vancomycin 1500 mg IV x1, then 1000 mg IV q24hrs  Anticipated AUC of 463 mg/l.hr and trough concentration of 14.6 mg/l at steady state  BMP ordered x 3 days   Pharmacy will continue to monitor patient and adjust therapy as indicated    X[facility]I Valdovinos [facility]  12/7/2022 5:19 PM   [phone]

## 2022-12-07 NOTE — ED PROVIDER NOTES
EMERGENCY DEPARTMENT HISTORY AND PHYSICAL EXAM    Date: 12/7/2022  Patient Name: Clay Bhardwaj    History of Presenting Illness     Chief Complaint   Patient presents with    Foot Pain         History Provided By: Patient and Patient's Son      Additional History (Context): Clay Bhardwaj is a 67 y.o. female with hypertension and COPD who presents with complaint of toe pain. Patient said that she stubbed her left great toe several months ago she is knows it was before Halloween but she is not sure how much more than it was after Labor Day and went to see her primary care doctor sometime thereafter. At that point,  Already started to become loosened and has subsequently fallen off. She said as of last Friday her toe area began to get much more markedly red. She does not have a podiatrist and does not have a history of diabetes. Denies fever. She says she has been soaking her right forefoot in Epson salts since Friday. PCP: Pauly Sen MD    Current Facility-Administered Medications   Medication Dose Route Frequency Provider Last Rate Last Admin    vancomycin (VANCOCIN) 1500 mg in  ml infusion  1,500 mg IntraVENous ONCE Liliana Lopez  mL/hr at 12/07/22 1835 1,500 mg at 12/07/22 1835    Vancomycin - Pharmacy to Dose  1 Each Other Rx Dosing/Monitoring Liliana Lopez PA        piperacillin-tazobactam (ZOSYN) 4.5 g in 0.9% sodium chloride (MBP/ADV) 100 mL MBP  4.5 g IntraVENous Q6H Liliana Lopez PA   IV Completed at 12/07/22 1755    [START ON 12/8/2022] vancomycin (VANCOCIN) 1,000 mg in 0.9% sodium chloride 250 mL (VIAL-MATE)  1,000 mg IntraVENous Q24H Liliana Lopez PA         Current Outpatient Medications   Medication Sig Dispense Refill    cephALEXin (Keflex) 500 mg capsule Take 2 Capsules by mouth two (2) times a day for 10 days.  40 Capsule 0    diphenhydrAMINE-acetaminophen (Tylenol PM Extra Strength)  mg tab Take 2 Tablets by mouth as needed for Pain (at night as needed for sleep and pain). loratadine (CLARITIN) 10 mg tablet Take 10 mg by mouth daily. revefenacin (YUPELRI) 175 mcg/3 mL nebu nebulizer solution 175 mcg by Nebulization route daily. albuterol-ipratropium (DUO-NEB) 2.5 mg-0.5 mg/3 ml nebu 3 mL by Nebulization route every six (6) hours as needed for Wheezing. 30 Nebule 2    predniSONE (DELTASONE) 20 mg tablet 2 tabs daily x 5 days then 1 tab daily x 5 days then 1/2 tab daily x 4 days (Patient taking differently: Take 20 mg by mouth daily. 2 tabs daily x 5 days then 1 tab daily x 5 days then 1/2 tab daily x 4 days) 17 Tablet 0    levoFLOXacin (LEVAQUIN) 500 mg tablet Take 1 Tablet by mouth every twenty-four (24) hours. 7 Tablet 0    arformoteroL (BROVANA) 15 mcg/2 mL nebu neb solution 2 mL by Nebulization route two (2) times a day. 60 Each 2    aspirin 81 mg chewable tablet Take 1 Tablet by mouth daily. 30 Tablet 2    budesonide (PULMICORT) 0.5 mg/2 mL nbsp 2 mL by Nebulization route two (2) times a day. 60 Each 2    dilTIAZem ER (CARDIZEM CD) 240 mg capsule Take 1 Capsule by mouth daily. 30 Capsule 2    guaiFENesin ER (MUCINEX) 600 mg ER tablet Take 1 Tablet by mouth every twelve (12) hours. 60 Tablet 0    telmisartan (MICARDIS) 80 mg tablet Take 1 Tablet by mouth daily. 30 Tablet 2    cetirizine (ZyrTEC) 10 mg tablet Take 10 mg by mouth daily. metoprolol tartrate (LOPRESSOR) 50 mg tablet Take 50 mg by mouth two (2) times a day. multivitamin (ONE A DAY) tablet Take 1 Tablet by mouth daily. Past History     Past Medical History:  Past Medical History:   Diagnosis Date    Asthma     Chronic obstructive pulmonary disease ([facility] [address]     Hypertension        Past Surgical History:  History reviewed. No pertinent surgical history. Family History:  History reviewed. No pertinent family history.     Social History:  Social History     Tobacco Use    Smoking status: Every Day     Packs/day: 0.50     Types: Cigarettes    Smokeless tobacco: Never Substance Use Topics    Alcohol use: Not Currently    Drug use: Never       Allergies: Allergies   Allergen Reactions    Codeine Rash    Demerol [Meperidine] Rash    Spiriva Respimat [Tiotropium Bromide] Other (comments)     Wheezing  ( tolerates other anticholinergics like Itratroprium )    Statins-Hmg-Coa Reductase Inhibitors Myalgia    Sulfa (Sulfonamide Antibiotics) Rash         Review of Systems   Review of Systems   Constitutional:  Negative for fever. HENT: Negative. Eyes: Negative. Respiratory: Negative. Cardiovascular: Negative. Gastrointestinal: Negative. Endocrine: Negative. Genitourinary: Negative. Musculoskeletal:  Positive for arthralgias and joint swelling. Skin:  Positive for color change and wound. Allergic/Immunologic: Negative for immunocompromised state. Neurological: Negative. Hematological:  Does not bruise/bleed easily. Psychiatric/Behavioral: Negative. All Other Systems Negative  Physical Exam     Vitals:    12/07/22 1603   BP: 122/78   Pulse: 73   Resp: 20   Temp: 97.4 °F (36.3 °C)   SpO2: 97%   Weight: 80.3 kg (177 lb)   Height: 5' 7\" (1.702 m)     Physical Exam  Vitals and nursing note reviewed. Constitutional:       Appearance: She is well-developed. HENT:      Head: Normocephalic and atraumatic. Eyes:      Pupils: Pupils are equal, round, and reactive to light. Neck:      Thyroid: No thyromegaly. Vascular: No JVD. Trachea: No tracheal deviation. Cardiovascular:      Rate and Rhythm: Normal rate and regular rhythm. Heart sounds: Normal heart sounds. No murmur heard. No friction rub. No gallop. Pulmonary:      Effort: Pulmonary effort is normal. No respiratory distress. Breath sounds: Normal breath sounds. No stridor. No wheezing or rales. Chest:      Chest wall: No tenderness. Abdominal:      General: There is no distension. Palpations: Abdomen is soft. There is no mass. Tenderness:  There is no abdominal tenderness. There is no guarding or rebound. Musculoskeletal:         General: Swelling, tenderness and deformity present. Lymphadenopathy:      Cervical: No cervical adenopathy. Skin:     General: Skin is warm and dry. Coloration: Skin is not pale. Findings: Erythema and lesion present. No rash. Comments: Left great toe[de-identified] Subungual hematoma likely for bruising on nailbed itself. The nail is absent. There is mild swelling and diffuse erythema along the toe itself extending proximally and distally with pink or paler confluent erythema along the forefoot on the dorsal surface. No discrete abscess is palpated. Cap refill less than 2 seconds each digit and sensation intact throughout. Neurological:      Mental Status: She is alert and oriented to person, place, and time. Psychiatric:         Behavior: Behavior normal.         Thought Content: Thought content normal.        Diagnostic Study Results     Labs -     Recent Results (from the past 12 hour(s))   CBC WITH AUTOMATED DIFF    Collection Time: 12/07/22  4:20 PM   Result Value Ref Range    WBC 6.5 4.6 - 13.2 K/uL    RBC 3.46 (L) 4.20 - 5.30 M/uL    HGB 10.6 (L) 12.0 - 16.0 g/dL    HCT 32.5 (L) 35.0 - 45.0 %    MCV 93.9 78.0 - 100.0 FL    MCH 30.6 24.0 - 34.0 PG    MCHC 32.6 31.0 - 37.0 g/dL    RDW 13.2 11.6 - 14.5 %    PLATELET 649 782 - 906 K/uL    MPV 10.0 9.2 - 11.8 FL    NRBC 0.0 0  WBC    ABSOLUTE NRBC 0.00 0.00 - 0.01 K/uL    NEUTROPHILS 58 40 - 73 %    LYMPHOCYTES 23 21 - 52 %    MONOCYTES 6 3 - 10 %    EOSINOPHILS 12 (H) 0 - 5 %    BASOPHILS 1 0 - 2 %    IMMATURE GRANULOCYTES 0 0.0 - 0.5 %    ABS. NEUTROPHILS 3.8 1.8 - 8.0 K/UL    ABS. LYMPHOCYTES 1.5 0.9 - 3.6 K/UL    ABS. MONOCYTES 0.4 0.05 - 1.2 K/UL    ABS. EOSINOPHILS 0.8 (H) 0.0 - 0.4 K/UL    ABS. BASOPHILS 0.1 0.0 - 0.1 K/UL    ABS. IMM.  GRANS. 0.0 0.00 - 0.04 K/UL    DF AUTOMATED     METABOLIC PANEL, COMPREHENSIVE    Collection Time: 12/07/22  4:20 PM Result Value Ref Range    Sodium 129 (L) 136 - 145 mmol/L    Potassium 4.4 3.5 - 5.5 mmol/L    Chloride 96 (L) 100 - 111 mmol/L    CO2 28 21 - 32 mmol/L    Anion gap 5 3.0 - 18 mmol/L    Glucose 82 74 - 99 mg/dL    BUN 23 (H) 7.0 - 18 MG/DL    Creatinine 1.39 (H) 0.6 - 1.3 MG/DL    BUN/Creatinine ratio 17 12 - 20      eGFR 40 (L) >60 ml/min/1.73m2    Calcium 9.2 8.5 - 10.1 MG/DL    Bilirubin, total 0.4 0.2 - 1.0 MG/DL    ALT (SGPT) 20 13 - 56 U/L    AST (SGOT) 22 10 - 38 U/L    Alk. phosphatase 87 45 - 117 U/L    Protein, total 7.6 6.4 - 8.2 g/dL    Albumin 3.6 3.4 - 5.0 g/dL    Globulin 4.0 2.0 - 4.0 g/dL    A-G Ratio 0.9 0.8 - 1.7     POC LACTIC ACID    Collection Time: 12/07/22  4:31 PM   Result Value Ref Range    Lactic Acid (POC) 1.25 0.40 - 2.00 mmol/L   COVID-19 WITH INFLUENZA A/B    Collection Time: 12/07/22  5:25 PM   Result Value Ref Range    SARS-CoV-2 by PCR Not detected NOTD      Influenza A by PCR Not detected NOTD      Influenza B by PCR Not detected NOTD         Radiologic Studies -   XR FOOT LT MIN 3 V   Final Result      No acute osseous abnormality. Nonspecific left forefoot soft tissue swelling. XR CHEST PORT   Final Result      No active cardiopulmonary disease. CT Results  (Last 48 hours)      None          CXR Results  (Last 48 hours)                 12/07/22 1646  XR CHEST PORT Final result    Impression:      No active cardiopulmonary disease. Narrative:  EXAM: CHEST RADIOGRAPH, SINGLE VIEW       CLINICAL INDICATION/HISTORY: worsening shortness of breath       COMPARISON: 4/21/2022       TECHNIQUE: Portable frontal view of the chest was obtained.        _______________       FINDINGS:       SUPPORT DEVICES: None. HEART AND MEDIASTINUM: Cardiomediastinal silhouette appears within normal   limits. Normal caliber thoracic aorta. No central vascular congestion. LUNGS AND PLEURAL SPACES: Lungs are hyperinflated with no confluent airspace   opacity.   No pleural effusion or pneumothorax. BONY THORAX AND SOFT TISSUES: No acute osseous abnormality. _______________                     Medical Decision Making   I am the first provider for this patient. I reviewed the vital signs, available nursing notes, past medical history, past surgical history, family history and social history. Vital Signs-Reviewed the patient's vital signs. Records Reviewed: Nursing Notes    Procedures:  Procedures    Provider Notes (Medical Decision Making): Patient is not immunocompromised with history of kidney disease diabetes immunosuppressants or active cancer. She has no focal collection of an abscess to drain as this is more cellulitic in appearance. There is no collection of soft gas or other and bone infection on her x-ray. She has no leukocytosis we will start her on Keflex at home I did do a skin pen marker around the cellulitic changes and told her to follow-up with her PCP or podiatry within 2 days and for any worsening to return immediately or nonimprovement or worsening on the antibiotic to return to the emergency department or if unable to obtain a follow-up to return to the emergency emergency department. Given IV fluids for her hyponatremia which she states is typical for her. EF is good on based on prior echoes. Multiple nebs treatments have improved her COPD and sent COVID flu off which was negative. Ammonia on chest x-ray.   MED RECONCILIATION:  Current Facility-Administered Medications   Medication Dose Route Frequency    vancomycin (VANCOCIN) 1500 mg in  ml infusion  1,500 mg IntraVENous ONCE    Vancomycin - Pharmacy to Dose  1 Each Other Rx Dosing/Monitoring    piperacillin-tazobactam (ZOSYN) 4.5 g in 0.9% sodium chloride (MBP/ADV) 100 mL MBP  4.5 g IntraVENous Q6H    [START ON 12/8/2022] vancomycin (VANCOCIN) 1,000 mg in 0.9% sodium chloride 250 mL (VIAL-MATE)  1,000 mg IntraVENous Q24H     Current Outpatient Medications   Medication Sig cephALEXin (Keflex) 500 mg capsule Take 2 Capsules by mouth two (2) times a day for 10 days. diphenhydrAMINE-acetaminophen (Tylenol PM Extra Strength)  mg tab Take 2 Tablets by mouth as needed for Pain (at night as needed for sleep and pain). loratadine (CLARITIN) 10 mg tablet Take 10 mg by mouth daily. revefenacin (YUPELRI) 175 mcg/3 mL nebu nebulizer solution 175 mcg by Nebulization route daily. albuterol-ipratropium (DUO-NEB) 2.5 mg-0.5 mg/3 ml nebu 3 mL by Nebulization route every six (6) hours as needed for Wheezing. predniSONE (DELTASONE) 20 mg tablet 2 tabs daily x 5 days then 1 tab daily x 5 days then 1/2 tab daily x 4 days (Patient taking differently: Take 20 mg by mouth daily. 2 tabs daily x 5 days then 1 tab daily x 5 days then 1/2 tab daily x 4 days)    levoFLOXacin (LEVAQUIN) 500 mg tablet Take 1 Tablet by mouth every twenty-four (24) hours. arformoteroL (BROVANA) 15 mcg/2 mL nebu neb solution 2 mL by Nebulization route two (2) times a day. aspirin 81 mg chewable tablet Take 1 Tablet by mouth daily. budesonide (PULMICORT) 0.5 mg/2 mL nbsp 2 mL by Nebulization route two (2) times a day. dilTIAZem ER (CARDIZEM CD) 240 mg capsule Take 1 Capsule by mouth daily. guaiFENesin ER (MUCINEX) 600 mg ER tablet Take 1 Tablet by mouth every twelve (12) hours. telmisartan (MICARDIS) 80 mg tablet Take 1 Tablet by mouth daily. cetirizine (ZyrTEC) 10 mg tablet Take 10 mg by mouth daily. metoprolol tartrate (LOPRESSOR) 50 mg tablet Take 50 mg by mouth two (2) times a day. multivitamin (ONE A DAY) tablet Take 1 Tablet by mouth daily. Disposition:  home    DISCHARGE NOTE:   8:12 PM    Pt has been reexamined. Patient has no new complaints, changes, or physical findings. Care plan outlined and precautions discussed. Results of labs, x-rays were reviewed with the patient. All medications were reviewed with the patient; will d/c home with keflex.  All of pt's questions and concerns were addressed. Patient was instructed and agrees to follow up with PCP, podiatry, as well as to return to the ED upon further deterioration. Patient is ready to go home. Follow-up Information       Follow up With Specialties Details Why Contact Info    Barbra Barker DPM Podiatry Schedule an appointment as soon as possible for a visit in 1 day  [address]  [address] to [address] [address]      [facility] EMERGENCY DEPT Emergency Medicine  If symptoms worsen return immediately [address] [id number] [phone]            Current Discharge Medication List        START taking these medications    Details   cephALEXin (Keflex) 500 mg capsule Take 2 Capsules by mouth two (2) times a day for 10 days. Qty: 40 Capsule, Refills: 0  Start date: 12/7/2022, End date: 12/17/2022               Diagnosis     Clinical Impression:   1. Cellulitis of toe of left foot    2. Acute exacerbation of chronic obstructive pulmonary disease (COPD) ([facility] [address]    3.  Hyponatremia 3 2

## 2022-12-07 NOTE — ED NOTES
Provider updated waiting nebulizer until covid test returns. Pt requesting to self cath, provider made aware and states that patient can self cath.

## 2022-12-11 LAB
BACTERIA SPEC CULT: NORMAL
BACTERIA SPEC CULT: NORMAL
SERVICE CMNT-IMP: NORMAL
SERVICE CMNT-IMP: NORMAL

## 2022-12-15 ENCOUNTER — HOSPITAL ENCOUNTER (OUTPATIENT)
Dept: GENERAL RADIOLOGY | Age: 72
Discharge: HOME | End: 2022-12-15
Payer: MEDICARE

## 2022-12-15 ENCOUNTER — TRANSCRIBE ORDER (OUTPATIENT)
Dept: REGISTRATION | Age: 72
End: 2022-12-15

## 2022-12-15 DIAGNOSIS — M86.072 ACUTE HEMATOGENOUS OSTEOMYELITIS OF LEFT ANKLE (HCC): ICD-10-CM

## 2022-12-15 DIAGNOSIS — M86.072 ACUTE HEMATOGENOUS OSTEOMYELITIS OF LEFT ANKLE (HCC): Primary | ICD-10-CM

## 2022-12-15 PROCEDURE — 73660 X-RAY EXAM OF TOE(S): CPT

## 2023-01-04 ENCOUNTER — TRANSCRIBE ORDER (OUTPATIENT)
Dept: SCHEDULING | Age: 73
End: 2023-01-04

## 2023-01-04 DIAGNOSIS — M79.605 LEFT LEG PAIN: Primary | ICD-10-CM

## 2023-01-04 DIAGNOSIS — I70.223 ATHEROSCLEROSIS OF NATIVE ARTERY OF BOTH LOWER EXTREMITIES WITH REST PAIN (HCC): ICD-10-CM

## 2023-01-04 DIAGNOSIS — M86.072 ACUTE HEMATOGENOUS OSTEOMYELITIS OF LEFT FOOT (HCC): ICD-10-CM

## 2023-01-06 ENCOUNTER — HOSPITAL ENCOUNTER (OUTPATIENT)
Dept: VASCULAR SURGERY | Age: 73
Discharge: HOME | End: 2023-01-06
Attending: PODIATRIST
Payer: MEDICARE

## 2023-01-06 DIAGNOSIS — I70.223 ATHEROSCLEROSIS OF NATIVE ARTERY OF BOTH LOWER EXTREMITIES WITH REST PAIN (HCC): ICD-10-CM

## 2023-01-06 LAB
LEFT ARM BP: 124 MMHG
RIGHT ABI: 0.37
RIGHT ARM BP: 152 MMHG
RIGHT TBI: 0.24
RIGHT TOE PRESSURE: 37 MMHG
VAS RIGHT DORSALIS PEDIS BP: 56 MMHG

## 2023-01-06 PROCEDURE — 93923 UPR/LXTR ART STDY 3+ LVLS: CPT

## 2023-02-16 ENCOUNTER — TRANSCRIBE ORDERS (OUTPATIENT)
Facility: HOSPITAL | Age: 73
End: 2023-02-16

## 2023-02-16 DIAGNOSIS — M79.672 FOOT PAIN, LEFT: Primary | ICD-10-CM

## 2023-03-26 ENCOUNTER — APPOINTMENT (OUTPATIENT)
Facility: HOSPITAL | Age: 73
DRG: 191 | End: 2023-03-26
Payer: MEDICARE

## 2023-03-26 ENCOUNTER — HOSPITAL ENCOUNTER (INPATIENT)
Facility: HOSPITAL | Age: 73
LOS: 4 days | Discharge: HOME | DRG: 191 | End: 2023-03-31
Attending: EMERGENCY MEDICINE | Admitting: FAMILY MEDICINE
Payer: MEDICARE

## 2023-03-26 DIAGNOSIS — R09.02 HYPOXIA: ICD-10-CM

## 2023-03-26 DIAGNOSIS — N39.0 URINARY TRACT INFECTION WITH HEMATURIA, SITE UNSPECIFIED: ICD-10-CM

## 2023-03-26 DIAGNOSIS — E87.6 HYPOKALEMIA: ICD-10-CM

## 2023-03-26 DIAGNOSIS — R31.9 URINARY TRACT INFECTION WITH HEMATURIA, SITE UNSPECIFIED: ICD-10-CM

## 2023-03-26 DIAGNOSIS — J18.9 PNEUMONIA OF RIGHT MIDDLE LOBE DUE TO INFECTIOUS ORGANISM: Primary | ICD-10-CM

## 2023-03-26 DIAGNOSIS — E87.1 HYPONATREMIA: ICD-10-CM

## 2023-03-26 DIAGNOSIS — E83.42 HYPOMAGNESEMIA: ICD-10-CM

## 2023-03-26 LAB
ALBUMIN SERPL-MCNC: 3.3 G/DL (ref 3.4–5)
ALBUMIN/GLOB SERPL: 0.9 (ref 0.8–1.7)
ALP SERPL-CCNC: 85 U/L (ref 45–117)
ALT SERPL-CCNC: 13 U/L (ref 13–56)
ANION GAP SERPL CALC-SCNC: 6 MMOL/L (ref 3–18)
APPEARANCE UR: CLEAR
APTT PPP: 24.3 SEC (ref 23–36.4)
ARTERIAL PATENCY WRIST A: ABNORMAL
AST SERPL-CCNC: 16 U/L (ref 10–38)
BACTERIA URNS QL MICRO: ABNORMAL /HPF
BASE EXCESS BLD CALC-SCNC: 3.5 MMOL/L
BASOPHILS # BLD: 0 K/UL (ref 0–0.1)
BASOPHILS NFR BLD: 0 % (ref 0–2)
BDY SITE: ABNORMAL
BILIRUB SERPL-MCNC: 0.4 MG/DL (ref 0.2–1)
BILIRUB UR QL: NEGATIVE
BODY TEMPERATURE: 98.1
BUN SERPL-MCNC: 7 MG/DL (ref 7–18)
BUN/CREAT SERPL: 6 (ref 12–20)
CALCIUM SERPL-MCNC: 8.6 MG/DL (ref 8.5–10.1)
CHLORIDE SERPL-SCNC: 95 MMOL/L (ref 100–111)
CO2 SERPL-SCNC: 28 MMOL/L (ref 21–32)
COLOR UR: YELLOW
CREAT SERPL-MCNC: 1.09 MG/DL (ref 0.6–1.3)
DIFFERENTIAL METHOD BLD: ABNORMAL
EOSINOPHIL # BLD: 0.2 K/UL (ref 0–0.4)
EOSINOPHIL NFR BLD: 2 % (ref 0–5)
EPITH CASTS URNS QL MICRO: ABNORMAL /LPF (ref 0–5)
ERYTHROCYTE [DISTWIDTH] IN BLOOD BY AUTOMATED COUNT: 13.8 % (ref 11.6–14.5)
FLUAV RNA SPEC QL NAA+PROBE: NOT DETECTED
FLUBV RNA SPEC QL NAA+PROBE: NOT DETECTED
GAS FLOW.O2 O2 DELIVERY SYS: ABNORMAL
GLOBULIN SER CALC-MCNC: 3.7 G/DL (ref 2–4)
GLUCOSE SERPL-MCNC: 119 MG/DL (ref 74–99)
GLUCOSE UR STRIP.AUTO-MCNC: NEGATIVE MG/DL
HCO3 BLD-SCNC: 26 MMOL/L (ref 22–26)
HCT VFR BLD AUTO: 33.2 % (ref 35–45)
HGB BLD-MCNC: 10.8 G/DL (ref 12–16)
HGB UR QL STRIP: NEGATIVE
IMM GRANULOCYTES # BLD AUTO: 0 K/UL (ref 0–0.04)
IMM GRANULOCYTES NFR BLD AUTO: 0 % (ref 0–0.5)
INR PPP: 1.1 (ref 0.8–1.2)
KETONES UR QL STRIP.AUTO: ABNORMAL MG/DL
LACTATE SERPL-SCNC: 1.5 MMOL/L (ref 0.4–2)
LEUKOCYTE ESTERASE UR QL STRIP.AUTO: ABNORMAL
LYMPHOCYTES # BLD: 0.8 K/UL (ref 0.9–3.6)
LYMPHOCYTES NFR BLD: 8 % (ref 21–52)
MAGNESIUM SERPL-MCNC: 1.5 MG/DL (ref 1.6–2.6)
MCH RBC QN AUTO: 30.2 PG (ref 24–34)
MCHC RBC AUTO-ENTMCNC: 32.5 G/DL (ref 31–37)
MCV RBC AUTO: 92.7 FL (ref 78–100)
MONOCYTES # BLD: 0.7 K/UL (ref 0.05–1.2)
MONOCYTES NFR BLD: 6 % (ref 3–10)
NEUTS SEG # BLD: 8.7 K/UL (ref 1.8–8)
NEUTS SEG NFR BLD: 83 % (ref 40–73)
NITRITE UR QL STRIP.AUTO: NEGATIVE
NRBC # BLD: 0 K/UL (ref 0–0.01)
NRBC BLD-RTO: 0 PER 100 WBC
NT PRO BNP: 1883 PG/ML (ref 0–900)
O2/TOTAL GAS SETTING VFR VENT: 21 %
PCO2 BLD: 31.2 MMHG (ref 35–45)
PH BLD: 7.53 (ref 7.35–7.45)
PH UR STRIP: 6.5 (ref 5–8)
PLATELET # BLD AUTO: 188 K/UL (ref 135–420)
PMV BLD AUTO: 9.5 FL (ref 9.2–11.8)
PO2 BLD: 62 MMHG (ref 80–100)
POTASSIUM SERPL-SCNC: 3.3 MMOL/L (ref 3.5–5.5)
PROT SERPL-MCNC: 7 G/DL (ref 6.4–8.2)
PROT UR STRIP-MCNC: ABNORMAL MG/DL
PROTHROMBIN TIME: 14.7 SEC (ref 11.5–15.2)
RBC # BLD AUTO: 3.58 M/UL (ref 4.2–5.3)
RBC #/AREA URNS HPF: ABNORMAL /HPF (ref 0–5)
RESPIRATORY RATE, POC: 28 (ref 5–40)
SAO2 % BLD: 94.4 % (ref 92–97)
SARS-COV-2 RNA RESP QL NAA+PROBE: NOT DETECTED
SERVICE CMNT-IMP: ABNORMAL
SODIUM SERPL-SCNC: 129 MMOL/L (ref 136–145)
SP GR UR REFRACTOMETRY: 1.02 (ref 1–1.03)
SPECIMEN TYPE: ABNORMAL
TROPONIN I SERPL HS-MCNC: 16 NG/L (ref 0–54)
UROBILINOGEN UR QL STRIP.AUTO: 1 EU/DL (ref 0.2–1)
WBC # BLD AUTO: 10.5 K/UL (ref 4.6–13.2)
WBC URNS QL MICRO: ABNORMAL /HPF (ref 0–5)

## 2023-03-26 PROCEDURE — 74176 CT ABD & PELVIS W/O CONTRAST: CPT

## 2023-03-26 PROCEDURE — 36600 WITHDRAWAL OF ARTERIAL BLOOD: CPT

## 2023-03-26 PROCEDURE — 2700000000 HC OXYGEN THERAPY PER DAY

## 2023-03-26 PROCEDURE — 93005 ELECTROCARDIOGRAM TRACING: CPT | Performed by: PHYSICIAN ASSISTANT

## 2023-03-26 PROCEDURE — 83880 ASSAY OF NATRIURETIC PEPTIDE: CPT

## 2023-03-26 PROCEDURE — 82803 BLOOD GASES ANY COMBINATION: CPT

## 2023-03-26 PROCEDURE — 6360000002 HC RX W HCPCS: Performed by: PHYSICIAN ASSISTANT

## 2023-03-26 PROCEDURE — 2580000003 HC RX 258: Performed by: PHYSICIAN ASSISTANT

## 2023-03-26 PROCEDURE — 81001 URINALYSIS AUTO W/SCOPE: CPT

## 2023-03-26 PROCEDURE — 83735 ASSAY OF MAGNESIUM: CPT

## 2023-03-26 PROCEDURE — 85610 PROTHROMBIN TIME: CPT

## 2023-03-26 PROCEDURE — 71045 X-RAY EXAM CHEST 1 VIEW: CPT

## 2023-03-26 PROCEDURE — 80053 COMPREHEN METABOLIC PANEL: CPT

## 2023-03-26 PROCEDURE — 96368 THER/DIAG CONCURRENT INF: CPT | Performed by: EMERGENCY MEDICINE

## 2023-03-26 PROCEDURE — 85730 THROMBOPLASTIN TIME PARTIAL: CPT

## 2023-03-26 PROCEDURE — 87040 BLOOD CULTURE FOR BACTERIA: CPT

## 2023-03-26 PROCEDURE — 84484 ASSAY OF TROPONIN QUANT: CPT

## 2023-03-26 PROCEDURE — 83605 ASSAY OF LACTIC ACID: CPT

## 2023-03-26 PROCEDURE — 96365 THER/PROPH/DIAG IV INF INIT: CPT | Performed by: EMERGENCY MEDICINE

## 2023-03-26 PROCEDURE — 85379 FIBRIN DEGRADATION QUANT: CPT

## 2023-03-26 PROCEDURE — 87636 SARSCOV2 & INF A&B AMP PRB: CPT

## 2023-03-26 PROCEDURE — 85025 COMPLETE CBC W/AUTO DIFF WBC: CPT

## 2023-03-26 PROCEDURE — 96375 TX/PRO/DX INJ NEW DRUG ADDON: CPT | Performed by: EMERGENCY MEDICINE

## 2023-03-26 PROCEDURE — 96366 THER/PROPH/DIAG IV INF ADDON: CPT | Performed by: EMERGENCY MEDICINE

## 2023-03-26 PROCEDURE — 99285 EMERGENCY DEPT VISIT HI MDM: CPT | Performed by: EMERGENCY MEDICINE

## 2023-03-26 RX ORDER — POTASSIUM CHLORIDE 7.45 MG/ML
10 INJECTION INTRAVENOUS
Status: COMPLETED | OUTPATIENT
Start: 2023-03-26 | End: 2023-03-26

## 2023-03-26 RX ORDER — ALBUTEROL SULFATE 2.5 MG/3ML
2.5 SOLUTION RESPIRATORY (INHALATION)
Status: COMPLETED | OUTPATIENT
Start: 2023-03-26 | End: 2023-03-26

## 2023-03-26 RX ORDER — MAGNESIUM SULFATE IN WATER 40 MG/ML
2000 INJECTION, SOLUTION INTRAVENOUS ONCE
Status: COMPLETED | OUTPATIENT
Start: 2023-03-26 | End: 2023-03-27

## 2023-03-26 RX ADMIN — ALBUTEROL SULFATE 2.5 MG: 2.5 SOLUTION RESPIRATORY (INHALATION) at 20:57

## 2023-03-26 RX ADMIN — CEFTRIAXONE 1000 MG: 1 INJECTION, POWDER, FOR SOLUTION INTRAMUSCULAR; INTRAVENOUS at 21:37

## 2023-03-26 RX ADMIN — SODIUM CHLORIDE 500 MG: 9 INJECTION, SOLUTION INTRAVENOUS at 23:06

## 2023-03-26 RX ADMIN — MAGNESIUM SULFATE HEPTAHYDRATE 2000 MG: 40 INJECTION, SOLUTION INTRAVENOUS at 22:04

## 2023-03-26 RX ADMIN — POTASSIUM CHLORIDE 10 MEQ: 7.46 INJECTION, SOLUTION INTRAVENOUS at 21:37

## 2023-03-26 ASSESSMENT — PAIN SCALES - GENERAL: PAINLEVEL_OUTOF10: 5

## 2023-03-26 ASSESSMENT — PAIN - FUNCTIONAL ASSESSMENT: PAIN_FUNCTIONAL_ASSESSMENT: 0-10

## 2023-03-26 NOTE — ED TRIAGE NOTES
Pt to be eval for urinary retention with last output at 1600 and  discomfort rated at 5/10. Pt self cath with a size 16F and only has 14F available to her; pt denies frequent infections.  Pt reports her pain in lower pelvic region; appears uncomfortable in triage

## 2023-03-27 PROBLEM — R91.8 PULMONARY INFILTRATES: Status: ACTIVE | Noted: 2023-03-27

## 2023-03-27 PROBLEM — E87.1 HYPONATREMIA: Status: RESOLVED | Noted: 2021-09-02 | Resolved: 2023-03-27

## 2023-03-27 PROBLEM — J96.01 ACUTE HYPOXEMIC RESPIRATORY FAILURE (HCC): Status: ACTIVE | Noted: 2023-03-27

## 2023-03-27 PROBLEM — K80.20 CHOLELITHIASIS: Status: RESOLVED | Noted: 2021-09-02 | Resolved: 2023-03-27

## 2023-03-27 PROBLEM — R00.0 SINUS TACHYCARDIA: Status: RESOLVED | Noted: 2021-09-07 | Resolved: 2023-03-27

## 2023-03-27 PROBLEM — D64.9 ANEMIA: Status: ACTIVE | Noted: 2023-03-27

## 2023-03-27 PROBLEM — J47.1 BRONCHIECTASIS WITH ACUTE EXACERBATION (HCC): Status: ACTIVE | Noted: 2023-03-27

## 2023-03-27 PROBLEM — Z78.9 SELF-CATHETERIZES URINARY BLADDER: Status: ACTIVE | Noted: 2023-03-27

## 2023-03-27 PROBLEM — R33.9 CHRONIC RETENTION OF URINE: Status: ACTIVE | Noted: 2023-03-27

## 2023-03-27 PROBLEM — J96.91 RESPIRATORY FAILURE WITH HYPOXIA (HCC): Status: RESOLVED | Noted: 2022-04-21 | Resolved: 2023-03-27

## 2023-03-27 PROBLEM — R09.02 HYPOXIA: Status: ACTIVE | Noted: 2023-03-27

## 2023-03-27 PROBLEM — J18.9 PNEUMONIA: Status: RESOLVED | Noted: 2022-04-21 | Resolved: 2023-03-27

## 2023-03-27 LAB
ANION GAP SERPL CALC-SCNC: 5 MMOL/L (ref 3–18)
BUN SERPL-MCNC: 5 MG/DL (ref 7–18)
BUN/CREAT SERPL: 5 (ref 12–20)
CALCIUM SERPL-MCNC: 8.7 MG/DL (ref 8.5–10.1)
CHLORIDE SERPL-SCNC: 98 MMOL/L (ref 100–111)
CO2 SERPL-SCNC: 30 MMOL/L (ref 21–32)
CREAT SERPL-MCNC: 0.91 MG/DL (ref 0.6–1.3)
D DIMER PPP FEU-MCNC: 3.64 UG/ML(FEU)
GLUCOSE SERPL-MCNC: 113 MG/DL (ref 74–99)
IRON SATN MFR SERPL: 5 % (ref 20–50)
IRON SERPL-MCNC: 22 UG/DL (ref 50–175)
L PNEUMO AG UR QL IA: NEGATIVE
MAGNESIUM SERPL-MCNC: 2.2 MG/DL (ref 1.6–2.6)
POTASSIUM SERPL-SCNC: 3 MMOL/L (ref 3.5–5.5)
PROCALCITONIN SERPL-MCNC: <0.05 NG/ML
S PNEUM AG UR QL: NEGATIVE
SODIUM SERPL-SCNC: 133 MMOL/L (ref 136–145)
TIBC SERPL-MCNC: 457 UG/DL (ref 250–450)

## 2023-03-27 PROCEDURE — 87449 NOS EACH ORGANISM AG IA: CPT

## 2023-03-27 PROCEDURE — 6370000000 HC RX 637 (ALT 250 FOR IP): Performed by: FAMILY MEDICINE

## 2023-03-27 PROCEDURE — 83735 ASSAY OF MAGNESIUM: CPT

## 2023-03-27 PROCEDURE — 6360000002 HC RX W HCPCS: Performed by: INTERNAL MEDICINE

## 2023-03-27 PROCEDURE — 2700000000 HC OXYGEN THERAPY PER DAY

## 2023-03-27 PROCEDURE — 80048 BASIC METABOLIC PNL TOTAL CA: CPT

## 2023-03-27 PROCEDURE — 97161 PT EVAL LOW COMPLEX 20 MIN: CPT

## 2023-03-27 PROCEDURE — 6370000000 HC RX 637 (ALT 250 FOR IP): Performed by: INTERNAL MEDICINE

## 2023-03-27 PROCEDURE — 82607 VITAMIN B-12: CPT

## 2023-03-27 PROCEDURE — 86480 TB TEST CELL IMMUN MEASURE: CPT

## 2023-03-27 PROCEDURE — 83550 IRON BINDING TEST: CPT

## 2023-03-27 PROCEDURE — 87086 URINE CULTURE/COLONY COUNT: CPT

## 2023-03-27 PROCEDURE — 84145 PROCALCITONIN (PCT): CPT

## 2023-03-27 PROCEDURE — 94640 AIRWAY INHALATION TREATMENT: CPT

## 2023-03-27 PROCEDURE — 6360000002 HC RX W HCPCS: Performed by: FAMILY MEDICINE

## 2023-03-27 PROCEDURE — 1100000003 HC PRIVATE W/ TELEMETRY

## 2023-03-27 PROCEDURE — 97530 THERAPEUTIC ACTIVITIES: CPT

## 2023-03-27 PROCEDURE — 97166 OT EVAL MOD COMPLEX 45 MIN: CPT

## 2023-03-27 PROCEDURE — 2580000003 HC RX 258: Performed by: FAMILY MEDICINE

## 2023-03-27 PROCEDURE — 51702 INSERT TEMP BLADDER CATH: CPT

## 2023-03-27 PROCEDURE — 36415 COLL VENOUS BLD VENIPUNCTURE: CPT

## 2023-03-27 RX ORDER — POTASSIUM CHLORIDE 7.45 MG/ML
10 INJECTION INTRAVENOUS
Status: DISPENSED | OUTPATIENT
Start: 2023-03-27 | End: 2023-03-27

## 2023-03-27 RX ORDER — ARFORMOTEROL TARTRATE 15 UG/2ML
15 SOLUTION RESPIRATORY (INHALATION) 2 TIMES DAILY
Status: DISCONTINUED | OUTPATIENT
Start: 2023-03-27 | End: 2023-03-31 | Stop reason: HOSPADM

## 2023-03-27 RX ORDER — POLYETHYLENE GLYCOL 3350 17 G/17G
17 POWDER, FOR SOLUTION ORAL DAILY PRN
Status: DISCONTINUED | OUTPATIENT
Start: 2023-03-27 | End: 2023-03-31 | Stop reason: HOSPADM

## 2023-03-27 RX ORDER — FAMOTIDINE 20 MG/1
20 TABLET, FILM COATED ORAL DAILY
Status: DISCONTINUED | OUTPATIENT
Start: 2023-03-27 | End: 2023-03-31 | Stop reason: HOSPADM

## 2023-03-27 RX ORDER — ONDANSETRON 4 MG/1
4 TABLET, ORALLY DISINTEGRATING ORAL EVERY 8 HOURS PRN
Status: DISCONTINUED | OUTPATIENT
Start: 2023-03-27 | End: 2023-03-31 | Stop reason: HOSPADM

## 2023-03-27 RX ORDER — ARFORMOTEROL TARTRATE 15 UG/2ML
15 SOLUTION RESPIRATORY (INHALATION) 2 TIMES DAILY
Status: DISCONTINUED | OUTPATIENT
Start: 2023-03-27 | End: 2023-03-27

## 2023-03-27 RX ORDER — DOXYCYCLINE 100 MG/1
100 CAPSULE ORAL EVERY 12 HOURS SCHEDULED
Status: DISCONTINUED | OUTPATIENT
Start: 2023-03-27 | End: 2023-03-31 | Stop reason: HOSPADM

## 2023-03-27 RX ORDER — ACETAMINOPHEN 325 MG/1
650 TABLET ORAL EVERY 6 HOURS PRN
Status: DISCONTINUED | OUTPATIENT
Start: 2023-03-27 | End: 2023-03-31 | Stop reason: HOSPADM

## 2023-03-27 RX ORDER — CETIRIZINE HYDROCHLORIDE 10 MG/1
10 TABLET ORAL DAILY
Status: DISCONTINUED | OUTPATIENT
Start: 2023-03-27 | End: 2023-03-31 | Stop reason: HOSPADM

## 2023-03-27 RX ORDER — SODIUM CHLORIDE 0.9 % (FLUSH) 0.9 %
5-40 SYRINGE (ML) INJECTION EVERY 12 HOURS SCHEDULED
Status: DISCONTINUED | OUTPATIENT
Start: 2023-03-27 | End: 2023-03-31 | Stop reason: HOSPADM

## 2023-03-27 RX ORDER — ASPIRIN 81 MG/1
81 TABLET, CHEWABLE ORAL DAILY
Status: DISCONTINUED | OUTPATIENT
Start: 2023-03-27 | End: 2023-03-31 | Stop reason: HOSPADM

## 2023-03-27 RX ORDER — ALBUTEROL SULFATE 2.5 MG/3ML
2.5 SOLUTION RESPIRATORY (INHALATION)
Status: DISCONTINUED | OUTPATIENT
Start: 2023-03-27 | End: 2023-03-31 | Stop reason: HOSPADM

## 2023-03-27 RX ORDER — ENOXAPARIN SODIUM 100 MG/ML
40 INJECTION SUBCUTANEOUS DAILY
Status: DISCONTINUED | OUTPATIENT
Start: 2023-03-27 | End: 2023-03-31 | Stop reason: HOSPADM

## 2023-03-27 RX ORDER — SODIUM CHLORIDE 0.9 % (FLUSH) 0.9 %
5-40 SYRINGE (ML) INJECTION PRN
Status: DISCONTINUED | OUTPATIENT
Start: 2023-03-27 | End: 2023-03-31 | Stop reason: HOSPADM

## 2023-03-27 RX ORDER — POTASSIUM CHLORIDE 7.45 MG/ML
10 INJECTION INTRAVENOUS ONCE
Status: COMPLETED | OUTPATIENT
Start: 2023-03-27 | End: 2023-03-27

## 2023-03-27 RX ORDER — METOPROLOL TARTRATE 50 MG/1
50 TABLET, FILM COATED ORAL 2 TIMES DAILY
Status: DISCONTINUED | OUTPATIENT
Start: 2023-03-27 | End: 2023-03-31 | Stop reason: HOSPADM

## 2023-03-27 RX ORDER — DOXYCYCLINE 100 MG/1
100 CAPSULE ORAL EVERY 12 HOURS SCHEDULED
Status: DISCONTINUED | OUTPATIENT
Start: 2023-03-27 | End: 2023-03-27

## 2023-03-27 RX ORDER — ACETAMINOPHEN 650 MG/1
650 SUPPOSITORY RECTAL EVERY 6 HOURS PRN
Status: DISCONTINUED | OUTPATIENT
Start: 2023-03-27 | End: 2023-03-31 | Stop reason: HOSPADM

## 2023-03-27 RX ORDER — ONDANSETRON 2 MG/ML
4 INJECTION INTRAMUSCULAR; INTRAVENOUS EVERY 6 HOURS PRN
Status: DISCONTINUED | OUTPATIENT
Start: 2023-03-27 | End: 2023-03-31 | Stop reason: HOSPADM

## 2023-03-27 RX ORDER — BUDESONIDE 0.5 MG/2ML
500 INHALANT ORAL 2 TIMES DAILY
Status: DISCONTINUED | OUTPATIENT
Start: 2023-03-27 | End: 2023-03-27

## 2023-03-27 RX ORDER — SODIUM CHLORIDE 9 MG/ML
INJECTION, SOLUTION INTRAVENOUS PRN
Status: DISCONTINUED | OUTPATIENT
Start: 2023-03-27 | End: 2023-03-31 | Stop reason: HOSPADM

## 2023-03-27 RX ORDER — BUDESONIDE 0.5 MG/2ML
500 INHALANT ORAL 2 TIMES DAILY
Status: DISCONTINUED | OUTPATIENT
Start: 2023-03-27 | End: 2023-03-31 | Stop reason: HOSPADM

## 2023-03-27 RX ADMIN — SODIUM CHLORIDE, PRESERVATIVE FREE 10 ML: 5 INJECTION INTRAVENOUS at 21:01

## 2023-03-27 RX ADMIN — BUDESONIDE 500 MCG: 0.5 INHALANT RESPIRATORY (INHALATION) at 07:32

## 2023-03-27 RX ADMIN — METOPROLOL TARTRATE 50 MG: 50 TABLET ORAL at 21:01

## 2023-03-27 RX ADMIN — DOXYCYCLINE 100 MG: 100 CAPSULE ORAL at 21:01

## 2023-03-27 RX ADMIN — FAMOTIDINE 20 MG: 20 TABLET, FILM COATED ORAL at 09:57

## 2023-03-27 RX ADMIN — POTASSIUM CHLORIDE 10 MEQ: 7.46 INJECTION, SOLUTION INTRAVENOUS at 13:00

## 2023-03-27 RX ADMIN — POTASSIUM CHLORIDE 10 MEQ: 7.46 INJECTION, SOLUTION INTRAVENOUS at 11:06

## 2023-03-27 RX ADMIN — ENOXAPARIN SODIUM 40 MG: 100 INJECTION SUBCUTANEOUS at 09:57

## 2023-03-27 RX ADMIN — SODIUM CHLORIDE, PRESERVATIVE FREE 10 ML: 5 INJECTION INTRAVENOUS at 09:57

## 2023-03-27 RX ADMIN — POTASSIUM CHLORIDE 10 MEQ: 7.46 INJECTION, SOLUTION INTRAVENOUS at 17:25

## 2023-03-27 RX ADMIN — ARFORMOTEROL TARTRATE 15 MCG: 15 SOLUTION RESPIRATORY (INHALATION) at 07:32

## 2023-03-27 RX ADMIN — POTASSIUM CHLORIDE 10 MEQ: 7.45 INJECTION INTRAVENOUS at 20:24

## 2023-03-27 RX ADMIN — ASPIRIN 81 MG: 81 TABLET, CHEWABLE ORAL at 09:57

## 2023-03-27 RX ADMIN — CETIRIZINE HYDROCHLORIDE 10 MG: 10 TABLET, FILM COATED ORAL at 09:57

## 2023-03-27 RX ADMIN — METOPROLOL TARTRATE 50 MG: 50 TABLET ORAL at 09:57

## 2023-03-27 RX ADMIN — POTASSIUM CHLORIDE 10 MEQ: 7.46 INJECTION, SOLUTION INTRAVENOUS at 14:38

## 2023-03-27 RX ADMIN — ARFORMOTEROL TARTRATE 15 MCG: 15 SOLUTION RESPIRATORY (INHALATION) at 19:51

## 2023-03-27 RX ADMIN — CEFTRIAXONE 1000 MG: 1 INJECTION, POWDER, FOR SOLUTION INTRAMUSCULAR; INTRAVENOUS at 21:01

## 2023-03-27 RX ADMIN — BUDESONIDE 500 MCG: 0.5 INHALANT RESPIRATORY (INHALATION) at 19:51

## 2023-03-27 NOTE — PLAN OF CARE
Problem: Discharge Planning  Goal: Discharge to home or other facility with appropriate resources  3/27/2023 0438 by Naomi Chinchilla RN  Outcome: Progressing  3/27/2023 0147 by Naomi Chinchilla RN  Outcome: Progressing  Flowsheets (Taken 3/27/2023 0116)  Discharge to home or other facility with appropriate resources: Identify barriers to discharge with patient and caregiver     Problem: Pain  Goal: Verbalizes/displays adequate comfort level or baseline comfort level  3/27/2023 0438 by Naomi Chinchilla RN  Outcome: Progressing  3/27/2023 0147 by Naomi Chinchilla RN  Outcome: Progressing     Problem: Safety - Adult  Goal: Free from fall injury  3/27/2023 0438 by Naomi Chinchilla RN  Outcome: Progressing  3/27/2023 0147 by Naomi Chinchilla RN  Outcome: Progressing     Problem: ABCDS Injury Assessment  Goal: Absence of physical injury  3/27/2023 0438 by Naomi Chinchilla RN  Outcome: Progressing  3/27/2023 0147 by Naomi Chinchilla RN  Outcome: Progressing

## 2023-03-27 NOTE — PLAN OF CARE
Problem: Discharge Planning  Goal: Discharge to home or other facility with appropriate resources  Outcome: Progressing  Flowsheets (Taken 3/27/2023 0116)  Discharge to home or other facility with appropriate resources: Identify barriers to discharge with patient and caregiver     Problem: Pain  Goal: Verbalizes/displays adequate comfort level or baseline comfort level  Outcome: Progressing     Problem: Safety - Adult  Goal: Free from fall injury  Outcome: Progressing     Problem: ABCDS Injury Assessment  Goal: Absence of physical injury  Outcome: Progressing

## 2023-03-27 NOTE — ED PROVIDER NOTES
(has no administration in time range)   ondansetron (ZOFRAN-ODT) disintegrating tablet 4 mg (has no administration in time range)     Or   ondansetron (ZOFRAN) injection 4 mg (has no administration in time range)   polyethylene glycol (GLYCOLAX) packet 17 g (has no administration in time range)   acetaminophen (TYLENOL) tablet 650 mg (has no administration in time range)     Or   acetaminophen (TYLENOL) suppository 650 mg (has no administration in time range)   famotidine (PEPCID) tablet 20 mg (has no administration in time range)   albuterol (PROVENTIL) nebulizer solution 2.5 mg (has no administration in time range)   cefTRIAXone (ROCEPHIN) 1,000 mg in sodium chloride 0.9 % 50 mL IVPB (mini-bag) (has no administration in time range)   arformoterol tartrate (BROVANA) nebulizer solution 15 mcg (has no administration in time range)   aspirin chewable tablet 81 mg (has no administration in time range)   budesonide (PULMICORT) nebulizer suspension 500 mcg (has no administration in time range)   cetirizine (ZYRTEC) tablet 10 mg (has no administration in time range)   metoprolol tartrate (LOPRESSOR) tablet 50 mg (has no administration in time range)   albuterol (PROVENTIL) nebulizer solution 2.5 mg (2.5 mg Nebulization Given 3/26/23 2057)   magnesium sulfate 2000 mg in 50 mL IVPB premix (0 mg IntraVENous Stopped 3/27/23 0011)   potassium chloride 10 mEq/100 mL IVPB (Peripheral Line) (0 mEq IntraVENous Stopped 3/26/23 2309)   cefTRIAXone (ROCEPHIN) 1,000 mg in sodium chloride 0.9 % 50 mL IVPB (mini-bag) (0 mg IntraVENous Stopped 3/26/23 2209)     And   azithromycin (ZITHROMAX) 500 mg in sodium chloride 0.9% 250 mL (vial-mate/adapter) IVPB (500 mg IntraVENous Given 3/26/23 2306)            Medical Decision Making     CC/HPI Summary, DDx, ED Course, and Reassessment:   Lower abdominal pain and urinary retention however patient does not have much urine in bladder Kunz catheter placed and drained 270 cc.   Appears short of breath on exam has a history of COPD states she has been more short of breath recently. Tachycardic on arrival.  PO2 90. ABG shows that she is slightly hypoxic. Chest x-ray shows pneumonia. She does have lower abdominal tenderness possibly UTI diverticulitis colitis appendicitis. CT shows no abnormal findings in the lower abdomen but does show pulmonary nodules and findings consistent with pneumonia. Labs significant for hyponatremia hypokalemia and low magnesium. Given IV Rocephin and azithromycin. Will admit patient to telemetry    Disposition Considerations (tests considered but not done, Admit vs D/C, Shared Decision Making, Pt Expectation of Test or Tx.):        Critical Care Time:     None    Katy Martínez PA-C    Diagnosis and Disposition       1. Pneumonia of right middle lobe due to infectious organism    2. Hypoxia    3. Hyponatremia    4. Hypokalemia    5. Hypomagnesemia    6. Urinary tract infection with hematuria, site unspecified        DISPOSITION Admitted 03/27/2023 12:05:07 AM      PATIENT REFERRED TO:  No follow-up provider specified. DISCHARGE MEDICATIONS:  Current Discharge Medication List          DISCONTINUED MEDICATIONS:  Current Discharge Medication List                 (Please note that portions of this note were completed with a voice recognition program.  Efforts were made to edit the dictations but occasionally words are mis-transcribed.)    SAKSHI Culver (electronically signed)    I, Katy Martínez PA-C, am the primary clinician of record. Jeffrey Disclaimer     Please note that this dictation was completed with [employer], the computer voice recognition software. Quite often unanticipated grammatical, syntax, homophones, and other interpretive errors are inadvertently transcribed by the computer software. Please disregard these errors. Please excuse any errors that have escaped final proofreading.     Katy Martínez PA-C  (Electronically signed)         Alberto Garcia

## 2023-03-27 NOTE — PROGRESS NOTES
Admitted pt from ER. Received report from Marisol Zamora Dr. Pt AAOx3, NAD, breathing non labored, on O2 NC at 2L, HOB up. IV site clean, dry and intact. Bed at the lowest level on lock position, call bell w/i reach. Bed alarm on.

## 2023-03-27 NOTE — CONSULTS
No family history on file. Prior to Admission medications    Medication Sig Start Date End Date Taking? Authorizing Provider   arformoterol tartrate (BROVANA) 15 MCG/2ML NEBU Inhale 15 mcg into the lungs 2 times daily 9/8/21   Ar Automatic Reconciliation   aspirin 81 MG chewable tablet Take 81 mg by mouth daily 9/9/21   Ar Automatic Reconciliation   budesonide (PULMICORT) 0.5 MG/2ML nebulizer suspension Inhale 500 mcg into the lungs 2 times daily 9/8/21   Ar Automatic Reconciliation   cetirizine (ZYRTEC) 10 MG tablet Take 10 mg by mouth daily    Ar Automatic Reconciliation   dilTIAZem (TIAZAC) 240 MG extended release capsule Take 240 mg by mouth daily  Patient not taking: Reported on 3/27/2023 9/9/21   Ar Automatic Reconciliation   diphenhydrAMINE-APAP, sleep, (TYLENOL PM EXTRA STRENGTH)  MG tablet Take 2 tablets by mouth as needed    Ar Automatic Reconciliation   guaiFENesin (MUCINEX) 600 MG extended release tablet Take 600 mg by mouth in the morning and 600 mg in the evening.  9/8/21   Ar Automatic Reconciliation   ipratropium-albuterol (DUONEB) 0.5-2.5 (3) MG/3ML SOLN nebulizer solution Inhale 3 mLs into the lungs every 6 hours as needed 4/24/22   Ar Automatic Reconciliation   levoFLOXacin (LEVAQUIN) 500 MG tablet Take 500 mg by mouth every 24 hours  Patient not taking: Reported on 3/27/2023 4/24/22   Ar Automatic Reconciliation   loratadine (CLARITIN) 10 MG tablet Take 10 mg by mouth daily    Ar Automatic Reconciliation   metoprolol tartrate (LOPRESSOR) 50 MG tablet Take 50 mg by mouth 2 times daily    Ar Automatic Reconciliation   predniSONE (DELTASONE) 20 MG tablet [The details of the medication are not available because there are pending changes by a home health clinician.]  Patient not taking: Reported on 3/27/2023 4/24/22   Ar Automatic Reconciliation   Revefenacin 175 MCG/3ML SOLN Inhale 175 mcg into the lungs daily  Patient not taking: Reported on 3/27/2023    Ar Automatic Reconciliation Results   Component Value Date    CKTOTAL 128 09/05/2021    CKMB 12.5 (H) 09/05/2021    CKMBINDEX 9.8 (H) 09/05/2021    TROPONINI 0.02 09/05/2021           BNP Lab Results   Component Value Date/Time     04/21/2022 11:12 AM    BNP 3,965 09/06/2021 01:00 AM    BNP 3,499 09/04/2021 03:37 AM    BNP 1,158 09/02/2021 09:33 AM        Coagulation Recent Labs     03/26/23 2020   INR 1.1   APTT 24.3           Results       Procedure Component Value Units Date/Time    Strep Pneumoniae Antigen [[id number]] Collected: 03/27/23 0410    Order Status: Completed Specimen: Urine, random Updated: 03/27/23 0939     STREP PNEUMONIAE ANTIGEN, URINE Negative       Legionella antigen, urine [[id number]] Collected: 03/27/23 0410    Order Status: Completed Specimen: Urine, random Updated: 03/27/23 0939     Legionella Antigen, Urine Negative       Culture, Urine [[id number]] Collected: 03/27/23 0410    Order Status: Sent Specimen: Urine, clean catch Updated: 03/27/23 0946    Culture with Smear, Acid Fast Bacillius [[id number]]     Order Status: Sent Specimen: Sputum Expectorated     COVID-19 & Influenza Combo [[id number]] Collected: 03/26/23 2053    Order Status: Completed Specimen: Nasopharyngeal Updated: 03/26/23 2154     SARS-CoV-2, PCR Not detected        Comment: Not Detected results do not preclude SARS-CoV-2 infection and should not be used as the sole basis for patient management decisions. Results must be combined with clinical observations, patient history, and epidemiological information. Rapid Influenza A By PCR Not detected        Rapid Influenza B By PCR Not detected        Comment: Testing was performed using royce Gretel SARS-CoV-2 and Influenza A/B nucleic acid assay.   This test is a multiplex Real-Time Reverse Transcriptase Polymerase Chain Reaction (RT-PCR) based in vitro diagnostic test intended for the qualitative detection of nucleic acids from SARS-CoV-2, Influenza A, and Influenza B in nasopharyngeal for

## 2023-03-27 NOTE — PROGRESS NOTES
D/C Plan: Home with physician assistance and HH vs. Rehab with family to drive depending upon clinical progression and therapy recommendations    Case Management Assessment  Initial Evaluation    Date/Time of Evaluation: 3/27/2023 1:37 PM  Assessment Completed by: Jensen Mcpherson RN    If patient is discharged prior to next notation, then this note serves as note for discharge by case management. Patient Name: Shweta Delgadillo                   YOB: 1950  Diagnosis: Hypokalemia [E87.6]  Hypomagnesemia [E83.42]  Hyponatremia [E87.1]  Hypoxia [R09.02]  Urinary tract infection with hematuria, site unspecified [N39.0, R31.9]  Pneumonia of right middle lobe due to infectious organism [J18.9]                   Date / Time: 3/26/2023  7:56 PM    Patient Admission Status: Inpatient   Readmission Risk (Low < 19, Mod (19-27), High > 27): Readmission Risk Score: 10.4    Current PCP: Pedro Turner MD  PCP verified by CM? Yes    Chart Reviewed: Yes      History Provided by:    Patient Orientation: Alert and Oriented    Patient Cognition:      Hospitalization in the last 30 days (Readmission):  No    If yes, Readmission Assessment in CM Navigator will be completed. Advance Directives:      Code Status: DNR   Patient's Primary Decision Maker is: Legal Next of Kin (son)    Primary Decision MakerEarchirag Ontiveros  Child - [phone]    Discharge Planning:    Patient lives with: Children Type of Home: House  Primary Care Giver: Self  Patient Support Systems include: Children, Family Members   Current Financial resources: Medicare  Current community resources:    Current services prior to admission: Durable Medical Equipment            Current DME: [employer] [facility], Wheelchair            Type of Home Care services:  None    ADLS  Prior functional level: Independent in ADLs/IADLs  Current functional level:  Independent in ADLs/IADLs    PT AM-PAC:   /24  OT AM-PAC:   /24    Family can provide assistance at DC: and agrees with the discharge plan. Freedom of choice list with basic dialogue that supports the patient's individualized plan of care/goals and shares the quality data associated with the providers was provided to: Patient   Patient Representative Name:       The Patient and/or Patient Representative Agree with the Discharge Plan?  Yes    Daniella Mosqueda RN  Case Management Department  Ph: [phone]

## 2023-03-27 NOTE — PROGRESS NOTES
Hospitalist Progress Note    Patient: Janett Umana MRN: [id number]  CSN: [id number]    YOB: 1950  Age: 67 y.o. Sex: female    DOA: 3/26/2023 LOS:  LOS: 0 days          Chief Complaint:    Hypoxemia    Assessment/Plan     77-year-old female with history of COPD, hypertension, previous respiratory failure with hypoxia, chronic urinary retention who self caths. Admitted for:      Hypoxemia -arterial blood gas shows a PO2 of 59% on room air  on admission   O2 supplementation   Re-check ABG this am   02 supplementation to keep 02 >91%  Good pulmonary toilet, HOB >30%     Possible MAC/SHAHAB, CT chest/abd/pelvis > has pulmonary or systemic symptoms, has nodular or cavitary opacities on chest radiograph or bronchiectasis with multiple small nodules on high-resolution computed tomography (see below) to make full diagnosis: will need positive microbiologic criteria   Multifocal areas of nodular airspace disease/centrilobular nodularity in both   lungs with bronchial wall thickening and mucoid impaction.  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)   Obtain expectorated sputum samples (induce if necessary): respiratory culture and AFB smear   Urine legionella   Urine strep pneumo  Consult ID and Pulm    Multiple electrolyte abnormalities -  Sodium better  Potassium worse, will replete   Magnesium better     Chronic anemia -  Follow CBC  Check b12/folate and iron panel     COPD exacerbation -  Rachid Dunlap   Pulmicort   No systemic steroids yet     UTI -  Asked for Urine culture from original urine specimen BEFORE abx started   Continue IV rocephin     Hypertension -  Monitor BP and resume home antihypertensives once medication reconcillation completed by RN     Chronic urinary retention who self caths -  Monitor for urine retention with bladder scan q4 hrs       DVT and GI prophylaxis for input(s): CKTOTAL, CKMB, CKMBINDEX, TROPONINI, TELMA in the last 72 hours. Coagulation Recent Labs     03/26/23 2020   PROTIME 14.7   INR 1.1   APTT 24.3       Lipid Panel No results found for: CHOL, TRIG, HDL, LDLCHOLESTEROL, LDLCALC, LABVLDL, VLDL, CHOLHDLRATIO   BNP No results for input(s): BNP in the last 72 hours.    Liver Enzymes Recent Labs     03/26/23 2020   ALT 13   AST 16   ALKPHOS 85   BILITOT 0.4      Thyroid Studies No results found for: J0CZMOY, Y1RBEKX, FT3, T4FREE, TSH       Procedures/imaging: see electronic medical records for all procedures/Xrays and details which were not copied into this note but were reviewed prior to creation of Plan      Belem Guevara MD

## 2023-03-27 NOTE — ED NOTES
TRANSFER - OUT REPORT:    Verbal report given to [facility] on Talia Poll  being transferred to [facility] for routine progression of patient care       Report consisted of patient's Situation, Background, Assessment and   Recommendations(SBAR). Information from the following report(s) Nurse Handoff Report was reviewed with the receiving nurse. [place] Assessment: Presents to emergency department  because of falls (Syncope, seizure, or loss of consciousness): No, Age > 79: Yes, Altered Mental Status, Intoxication with alcohol or substance confusion (Disorientation, impaired judgment, poor safety awaremess, or inability to follow instructions): No, Impaired Mobility: Ambulates or transfers with assistive devices or assistance; Unable to ambulate or transer.: Yes, Nursing Judgement: Yes  Lines:   Peripheral IV 03/26/23 Left Antecubital (Active)   Site Assessment Clean, dry & intact 03/26/23 2028   Line Status Blood return noted 03/26/23 2028        Opportunity for questions and clarification was provided.       Patient transported with:  Registered Nurse           Nery Mcleod RN  03/27/23 3727

## 2023-03-27 NOTE — H&P
# 0.7 0.05 - 1.2 K/UL    Absolute Eos # 0.2 0.0 - 0.4 K/UL    Basophils Absolute 0.0 0.0 - 0.1 K/UL    Absolute Immature Granulocyte 0.0 0.00 - 0.04 K/UL    Differential Type AUTOMATED     CMP    Collection Time: 03/26/23  8:20 PM   Result Value Ref Range    Sodium 129 (L) 136 - 145 mmol/L    Potassium 3.3 (L) 3.5 - 5.5 mmol/L    Chloride 95 (L) 100 - 111 mmol/L    CO2 28 21 - 32 mmol/L    Anion Gap 6 3.0 - 18 mmol/L    Glucose 119 (H) 74 - 99 mg/dL    BUN 7 7.0 - 18 MG/DL    Creatinine 1.09 0.6 - 1.3 MG/DL    Bun/Cre Ratio 6 (L) 12 - 20      Est, Glom Filt Rate 54 (L) >60 ml/min/1.73m2    Calcium 8.6 8.5 - 10.1 MG/DL    Total Bilirubin 0.4 0.2 - 1.0 MG/DL    ALT 13 13 - 56 U/L    AST 16 10 - 38 U/L    Alk Phosphatase 85 45 - 117 U/L    Total Protein 7.0 6.4 - 8.2 g/dL    Albumin 3.3 (L) 3.4 - 5.0 g/dL    Globulin 3.7 2.0 - 4.0 g/dL    Albumin/Globulin Ratio 0.9 0.8 - 1.7     Brain Natriuretic Peptide    Collection Time: 03/26/23  8:20 PM   Result Value Ref Range    NT Pro-BNP 1,883 (H) 0 - 900 PG/ML   Troponin    Collection Time: 03/26/23  8:20 PM   Result Value Ref Range    Troponin, High Sensitivity 16 0 - 54 ng/L   Protime-INR    Collection Time: 03/26/23  8:20 PM   Result Value Ref Range    Protime 14.7 11.5 - 15.2 sec    INR 1.1 0.8 - 1.2     APTT    Collection Time: 03/26/23  8:20 PM   Result Value Ref Range    PTT 24.3 23.0 - 36.4 SEC   Magnesium    Collection Time: 03/26/23  8:20 PM   Result Value Ref Range    Magnesium 1.5 (L) 1.6 - 2.6 mg/dL   Lactic Acid    Collection Time: 03/26/23  8:20 PM   Result Value Ref Range    Lactic Acid, Plasma 1.5 0.4 - 2.0 MMOL/L   POC G3: BLOOD GASES INCLUDES CALC.  TCO2, HCO3, BASE EXCESS, SO2    Collection Time: 03/26/23  8:29 PM   Result Value Ref Range    DEVICE ROOM AIR      FIO2 21 %    pH, Arterial, POC 7.53 (H) 7.35 - 7.45      pCO2, Arterial, POC 31.2 (L) 35.0 - 45.0 MMHG    pO2, Arterial, POC 62 (L) 80 - 100 MMHG    HCO3, Mixed 26.0 22 - 26 MMOL/L    SO2c, Arterial, POC 94.4 92 - 97 %    Base Excess 3.5 mmol/L    POC Dano's Test NOT APPLICABLE      Respiratory Rate 28      Site RIGHT RADIAL      Pt Temp 98.1      Specimen type: ARTERIAL      Performed by: Nayeli Lynn    EKG 12 Lead    Collection Time: 03/26/23  8:48 PM   Result Value Ref Range    Ventricular Rate 91 BPM    Atrial Rate 91 BPM    P-R Interval 110 ms    QRS Duration 134 ms    Q-T Interval 424 ms    QTc Calculation (Bazett) 521 ms    P Axis 49 degrees    R Axis 33 degrees    T Axis 10 degrees    Diagnosis Sinus rhythm with short CT    COVID-19 & Influenza Combo    Collection Time: 03/26/23  8:53 PM    Specimen: Nasopharyngeal   Result Value Ref Range    SARS-CoV-2, PCR Not detected NOTD      Rapid Influenza A By PCR Not detected NOTD      Rapid Influenza B By PCR Not detected NOTD     Urinalysis    Collection Time: 03/26/23  8:53 PM   Result Value Ref Range    Color, UA YELLOW      Appearance CLEAR      Specific Gravity, UA 1.019 1.005 - 1.030      pH, Urine 6.5 5.0 - 8.0      Protein, UA TRACE (A) NEG mg/dL    Glucose, UA Negative NEG mg/dL    Ketones, Urine TRACE (A) NEG mg/dL    Bilirubin Urine Negative NEG      Blood, Urine Negative NEG      Urobilinogen, Urine 1.0 0.2 - 1.0 EU/dL    Nitrite, Urine Negative NEG      Leukocyte Esterase, Urine SMALL (A) NEG     Urinalysis, Micro    Collection Time: 03/26/23  8:53 PM   Result Value Ref Range    WBC, UA 4 to 10 0 - 5 /hpf    RBC, UA 0 to 3 0 - 5 /hpf    Epithelial Cells UA FEW 0 - 5 /lpf    BACTERIA, URINE FEW (A) NEG /hpf       Procedures/imaging: see electronic medical records for all procedures/Xrays and details which were not copied into this note but were reviewed prior to creation of Plan        CC: JAZMINE RAZA MD

## 2023-03-27 NOTE — PROGRESS NOTES
Yes  Transfers:   Transfer Training  Transfer Training: Yes  Overall Level of Assistance: Contact-guard assistance  Sit to Stand: Contact-guard assistance  Stand to Sit: Contact-guard assistance  Balance:   Balance  Sitting: Intact  Standing: Impaired  Standing - Static: Good  Standing - Dynamic: Fair  Ambulation/Gait Training:  Gait Training  Gait Training: Yes  Gait  Overall Level of Assistance: Contact-guard assistance  Speed/Raquel: Slow  Step Length: Right shortened;Left shortened  Stance: Time  Gait Abnormalities: Decreased step clearance  Distance (ft): 35 Feet  Assistive Device: Gait belt;Walker, rolling  Pain:  Pain level pre-treatment: 0/10   Pain level post-treatment: 0/10   Pain Intervention(s): Medication (see MAR); Rest, Ice, Repositioning  Response to intervention: Nurse notified, See doc flow    Activity Tolerance:   Activity Tolerance: Patient tolerated treatment well  Please refer to the flowsheet for vital signs taken during this treatment.     After treatment:   []         Patient left in no apparent distress sitting up in chair  [x]         Patient left in no apparent distress in bed  [x]         Call bell left within reach  [x]         Nursing notified  []         Caregiver present  [x]         Bed alarm activated  []         SCDs applied    COMMUNICATION/EDUCATION:   Patient Education  Education Given To: Patient  Education Provided: Role of Therapy;Plan of Care;Home Exercise Program;Transfer Training  Education Method: Demonstration;Verbal  Barriers to Learning: None  Education Outcome: Verbalized understanding;Demonstrated understanding    Thank you for this referral.  Gerard Dean, PT  Minutes: 10      Eval Complexity: [facility] AM-PAC® Basic Mobility Inpatient Short Form (6-Clicks) Version 2    How much HELP from another person does the patient currently need    (If the patient hasn't done an activity recently, how much help from another person do you think he/she would need if he/she tried?)   Total (Total A or Dep)   A Lot  (Mod to Max A)   A Little (Sup or Min A)   None (Mod I to I)   Turning from your back to your side while in a flat bed without using bedrails? [] 1 [] 2 [x] 3 [] 4   2. Moving from lying on your back to sitting on the side of a flat bed without using bedrails? [] 1 [] 2 [x] 3 [] 4   3. Moving to and from a bed to a chair (including a wheelchair)? [] 1 [] 2 [x] 3 [] 4   4. Standing up from a chair using your arms (e.g., wheelchair, or bedside chair)? [] 1 [] 2 [x] 3 [] 4   5. Walking in hospital room? [] 1 [] 2 [x] 3 [] 4   6. Climbing 3-5 steps with a railing?+   [] 1 [] 2 [] 3 [] 4   +If stair climbing cannot be assessed, skip item #6. Sum responses from items 1-5.

## 2023-03-27 NOTE — PROGRESS NOTES
Bedside and Verbal shift change report given to Cody Castellanos RN (oncoming nurse) by Tona See (offgoing nurse).  Report included the following information Nurse Handoff Report, Intake/Output, MAR, Recent Results, and Cardiac Rhythm SR .

## 2023-03-28 LAB
ANION GAP SERPL CALC-SCNC: 4 MMOL/L (ref 3–18)
BACTERIA SPEC CULT: NORMAL
BASOPHILS # BLD: 0.1 K/UL (ref 0–0.1)
BASOPHILS NFR BLD: 1 % (ref 0–2)
BUN SERPL-MCNC: 6 MG/DL (ref 7–18)
BUN/CREAT SERPL: 8 (ref 12–20)
CALCIUM SERPL-MCNC: 8.2 MG/DL (ref 8.5–10.1)
CHLORIDE SERPL-SCNC: 102 MMOL/L (ref 100–111)
CO2 SERPL-SCNC: 27 MMOL/L (ref 21–32)
CREAT SERPL-MCNC: 0.8 MG/DL (ref 0.6–1.3)
DIFFERENTIAL METHOD BLD: ABNORMAL
EOSINOPHIL # BLD: 0.8 K/UL (ref 0–0.4)
EOSINOPHIL NFR BLD: 12 % (ref 0–5)
ERYTHROCYTE [DISTWIDTH] IN BLOOD BY AUTOMATED COUNT: 14.1 % (ref 11.6–14.5)
FOLATE SERPL-MCNC: 7.7 NG/ML (ref 3.1–17.5)
GLUCOSE SERPL-MCNC: 99 MG/DL (ref 74–99)
HCT VFR BLD AUTO: 29.1 % (ref 35–45)
HGB BLD-MCNC: 9 G/DL (ref 12–16)
IMM GRANULOCYTES # BLD AUTO: 0 K/UL (ref 0–0.04)
IMM GRANULOCYTES NFR BLD AUTO: 0 % (ref 0–0.5)
LYMPHOCYTES # BLD: 1.6 K/UL (ref 0.9–3.6)
LYMPHOCYTES NFR BLD: 22 % (ref 21–52)
MCH RBC QN AUTO: 29.9 PG (ref 24–34)
MCHC RBC AUTO-ENTMCNC: 30.9 G/DL (ref 31–37)
MCV RBC AUTO: 96.7 FL (ref 78–100)
MONOCYTES # BLD: 0.6 K/UL (ref 0.05–1.2)
MONOCYTES NFR BLD: 8 % (ref 3–10)
NEUTS SEG # BLD: 4 K/UL (ref 1.8–8)
NEUTS SEG NFR BLD: 57 % (ref 40–73)
NRBC # BLD: 0 K/UL (ref 0–0.01)
NRBC BLD-RTO: 0 PER 100 WBC
PLATELET # BLD AUTO: 167 K/UL (ref 135–420)
PMV BLD AUTO: 9.9 FL (ref 9.2–11.8)
POTASSIUM SERPL-SCNC: 3.7 MMOL/L (ref 3.5–5.5)
RBC # BLD AUTO: 3.01 M/UL (ref 4.2–5.3)
SERVICE CMNT-IMP: NORMAL
SODIUM SERPL-SCNC: 133 MMOL/L (ref 136–145)
VIT B12 SERPL-MCNC: 405 PG/ML (ref 211–911)
WBC # BLD AUTO: 7 K/UL (ref 4.6–13.2)

## 2023-03-28 PROCEDURE — 80048 BASIC METABOLIC PNL TOTAL CA: CPT

## 2023-03-28 PROCEDURE — 51702 INSERT TEMP BLADDER CATH: CPT

## 2023-03-28 PROCEDURE — 6360000002 HC RX W HCPCS: Performed by: INTERNAL MEDICINE

## 2023-03-28 PROCEDURE — 6360000002 HC RX W HCPCS: Performed by: FAMILY MEDICINE

## 2023-03-28 PROCEDURE — 2580000003 HC RX 258: Performed by: FAMILY MEDICINE

## 2023-03-28 PROCEDURE — 6370000000 HC RX 637 (ALT 250 FOR IP): Performed by: INTERNAL MEDICINE

## 2023-03-28 PROCEDURE — 97116 GAIT TRAINING THERAPY: CPT

## 2023-03-28 PROCEDURE — 1100000003 HC PRIVATE W/ TELEMETRY

## 2023-03-28 PROCEDURE — 85025 COMPLETE CBC W/AUTO DIFF WBC: CPT

## 2023-03-28 PROCEDURE — 94640 AIRWAY INHALATION TREATMENT: CPT

## 2023-03-28 PROCEDURE — 36415 COLL VENOUS BLD VENIPUNCTURE: CPT

## 2023-03-28 PROCEDURE — 6370000000 HC RX 637 (ALT 250 FOR IP): Performed by: FAMILY MEDICINE

## 2023-03-28 PROCEDURE — 97110 THERAPEUTIC EXERCISES: CPT

## 2023-03-28 RX ADMIN — BUDESONIDE 500 MCG: 0.5 INHALANT RESPIRATORY (INHALATION) at 08:01

## 2023-03-28 RX ADMIN — SODIUM CHLORIDE, PRESERVATIVE FREE 10 ML: 5 INJECTION INTRAVENOUS at 08:29

## 2023-03-28 RX ADMIN — METOPROLOL TARTRATE 50 MG: 50 TABLET ORAL at 08:28

## 2023-03-28 RX ADMIN — ARFORMOTEROL TARTRATE 15 MCG: 15 SOLUTION RESPIRATORY (INHALATION) at 20:55

## 2023-03-28 RX ADMIN — CETIRIZINE HYDROCHLORIDE 10 MG: 10 TABLET, FILM COATED ORAL at 08:28

## 2023-03-28 RX ADMIN — ARFORMOTEROL TARTRATE 15 MCG: 15 SOLUTION RESPIRATORY (INHALATION) at 08:01

## 2023-03-28 RX ADMIN — FAMOTIDINE 20 MG: 20 TABLET, FILM COATED ORAL at 08:28

## 2023-03-28 RX ADMIN — ASPIRIN 81 MG: 81 TABLET, CHEWABLE ORAL at 08:28

## 2023-03-28 RX ADMIN — DOXYCYCLINE 100 MG: 100 CAPSULE ORAL at 08:28

## 2023-03-28 RX ADMIN — ENOXAPARIN SODIUM 40 MG: 100 INJECTION SUBCUTANEOUS at 08:28

## 2023-03-28 RX ADMIN — BUDESONIDE 500 MCG: 0.5 INHALANT RESPIRATORY (INHALATION) at 20:56

## 2023-03-28 RX ADMIN — DOXYCYCLINE 100 MG: 100 CAPSULE ORAL at 20:54

## 2023-03-28 RX ADMIN — SODIUM CHLORIDE, PRESERVATIVE FREE 10 ML: 5 INJECTION INTRAVENOUS at 20:55

## 2023-03-28 NOTE — PROGRESS NOTES
Bedside and Verbal shift change report given to [facility] (oncoming nurse) by Joselito Vázquez (offgoing nurse).  Report included the following information Nurse Handoff Report, Intake/Output, MAR, Recent Results, and Cardiac Rhythm SR .

## 2023-03-28 NOTE — PLAN OF CARE
Per MD note:   \"1. Multifocal areas of nodular airspace disease/centrilobular nodularity in both   lungs with bronchial wall thickening and mucoid impaction. 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 2. Cholelithiasis. 3. Colonic diverticulosis without diverticulitis. \"   Patient alert, oriented and in NAD. TB isolation/ precautions in place. Pulmonology consult note reviewed. Audible Expiratory wheezing noted, RT in for treatment.    Problem: Discharge Planning  Goal: Discharge to home or other facility with appropriate resources  Outcome: Progressing     Problem: Pain  Goal: Verbalizes/displays adequate comfort level or baseline comfort level  Outcome: Progressing     Problem: Safety - Adult  Goal: Free from fall injury  Outcome: Progressing     Problem: ABCDS Injury Assessment  Goal: Absence of physical injury  Outcome: Progressing     Problem: Respiratory - Adult  Goal: Achieves optimal ventilation and oxygenation  Outcome: Progressing yes

## 2023-03-28 NOTE — PROGRESS NOTES
03/28/23 1622   Treatment   Treatment Type HHN   Medications   (hypertonic saline)   Pre-Tx Pulse 67   Pre-Tx Resps 20   Delivery Source Mask   Position Sitting   Treatment Tolerance Well   Is patient on O2? Y   Oxygen Therapy/Pulse Ox   O2 Therapy Oxygen   SpO2 99 %   Cough/Sputum   Sputum How Obtained None   Instructed patient on coughing to obtain a sputum sample.  Pt has sputum containers at bedside

## 2023-03-28 NOTE — PROGRESS NOTES
Linette Nunez MD        famotidine (PEPCID) tablet 20 mg  20 mg Oral Daily Atilio Brown MD   20 mg at 03/28/23 0828    albuterol (PROVENTIL) nebulizer solution 2.5 mg  2.5 mg Nebulization Q2H PRN Atilio Brown MD        aspirin chewable tablet 81 mg  81 mg Oral Daily Atilio Brown MD   81 mg at 03/28/23 0828    cetirizine (ZYRTEC) tablet 10 mg  10 mg Oral Daily Atilio Brown MD   10 mg at 03/28/23 0828    metoprolol tartrate (LOPRESSOR) tablet 50 mg  50 mg Oral BID Atilio Brown MD   50 mg at 03/28/23 0828    arformoterol tartrate (BROVANA) nebulizer solution 15 mcg  15 mcg Nebulization BID Bobby Ventura MD   15 mcg at 03/28/23 0801    budesonide (PULMICORT) nebulizer suspension 500 mcg  500 mcg Nebulization BID Bobby Ventura MD   500 mcg at 03/28/23 0801    doxycycline monohydrate (MONODOX) capsule 100 mg  100 mg Oral 2 times per day Jame Arana MD   100 mg at 03/28/23 3486        Review of Systems     Negative Unless BOLDED     General: fevers, chills, myalgias, arthralgias, unexplained weight loss, malaise, fatigue. HEENT:  headaches, recent URI, recent dental procedures  PUlMONARY:  cough , shortness of breath, sputum production, hx of asthma or COPD. No previous treatement for TB or PPD. Cardiovascular: chest pain, previous CAD/MI, vavlular heart disease,  murmurs  GI:   nausea, vomiting, diarrhea, abdominal pain, prior C.diff  :  urinary frequency, dysuria, hematuria, bladder incontinence.  Chronic urinary retenion  Neurologic:  seizures, syncope or prior CVA/TIA, confusion, memory impairment, neuropathy  Musculoskeletal:  myalgias arthralgias, joint pain/ swelling,  back pain  Skin:  Purities, cellulitis,  chronic stasis ulcer, diabetic foot ulcers  Endocrine: polyuria, polydipsia, hair loss, weight gain  Psych: Denies depression or anxiety       Objective:       BP (!) 121/59   Pulse 71   Temp 97.4 °F (36.3 °C) (Oral)   Resp 18   Ht 5' 7\" BLADDER: Kunz catheter within the urinary bladder. LYMPH NODES:  None enlarged. REPRODUCTIVE ORGANS: Prior hysterectomy. FREE FLUID:  None. BONES: Postsurgical changes right femur partly visualized. ADDITIONAL COMMENTS: N/A.     1. Multifocal areas of nodular airspace disease/centrilobular nodularity in both lungs with bronchial wall thickening and mucoid impaction. 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 2. Cholelithiasis. 3. Colonic diverticulosis without diverticulitis. Francesco Orr MD  [facility])  Office #:     [phone]  [phone]   Office Fax: [phone]

## 2023-03-28 NOTE — PROGRESS NOTES
[employer] AM-PAC® Basic Mobility Inpatient Short Form (6-Clicks) Version 2    How much HELP from another person does the patient currently need    (If the patient hasn't done an activity recently, how much help from another person do you think he/she would need if he/she tried?)   Total (Total A or Dep)   A Lot  (Mod to Max A)   A Little (Sup or Min A)   None (Mod I to I)   Turning from your back to your side while in a flat bed without using bedrails? [] 1 [] 2 [x] 3 [] 4   2. Moving from lying on your back to sitting on the side of a flat bed without using bedrails? [] 1 [] 2 [x] 3 [] 4   3. Moving to and from a bed to a chair (including a wheelchair)? [] 1 [] 2 [x] 3 [] 4   4. Standing up from a chair using your arms (e.g., wheelchair, or bedside chair)? [] 1 [] 2 [x] 3 [] 4   5. Walking in hospital room? [] 1 [] 2 [x] 3 [] 4   6. Climbing 3-5 steps with a railing?+   [] 1 [] 2 [] 3 [] 4   +If stair climbing cannot be assessed, skip item #6. Sum responses from items 1-5.

## 2023-03-28 NOTE — PROGRESS NOTES
tartrate (LOPRESSOR) tablet 50 mg  50 mg Oral BID Atilio Brown MD   50 mg at 03/28/23 0828    arformoterol tartrate (BROVANA) nebulizer solution 15 mcg  15 mcg Nebulization BID Sloane King MD   15 mcg at 03/28/23 0801    budesonide (PULMICORT) nebulizer suspension 500 mcg  500 mcg Nebulization BID Sloane King MD   500 mcg at 03/28/23 0801    doxycycline monohydrate (MONODOX) capsule 100 mg  100 mg Oral 2 times per day Anny Landa MD   100 mg at 03/28/23 0534           Objective:    Intake/Output:     Intake/Output Summary (Last 24 hours) at 3/28/2023 1534  Last data filed at 3/28/2023 0519  Gross per 24 hour   Intake 500 ml   Output 3450 ml   Net -2950 ml         Vital Signs:    BP (!) 121/59   Pulse 71   Temp 97.4 °F (36.3 °C) (Oral)   Resp 18   Ht 5' 7\" (1.702 m)   Wt 180 lb (81.6 kg)   SpO2 97%   BMI 28.19 kg/m²     Weight:  Wt Readings from Last 3 Encounters:   03/26/23 180 lb (81.6 kg)        Physical Exam:   Comfortable; on 2 lits nc; acyanotic  HEENT: pupils not dilated, reactive, no scleral jaundice   Neck: No adenopathy or thyroid swelling  CVS: Normal heart sounds; S1 and S2 with no murmurs; JVD not seen  RS: Moderate air entry bilateral; bibasal crackles; no wheezing; not tachypneic or in distress  Abd: soft, non tender, no abd distension  Neuro: non focal, awake, alert  Extrm: no leg edema or swelling or clubbing  Skin: no rash  Lymphatic: no cervical or supraclavicular adenopathy  Psych: Cooperative      Data:       Recent Results (from the past 24 hour(s))   Basic Metabolic Panel w/ Reflex to MG    Collection Time: 03/28/23  4:46 AM   Result Value Ref Range    Sodium 133 (L) 136 - 145 mmol/L    Potassium 3.7 3.5 - 5.5 mmol/L    Chloride 102 100 - 111 mmol/L    CO2 27 21 - 32 mmol/L    Anion Gap 4 3.0 - 18 mmol/L    Glucose 99 74 - 99 mg/dL    BUN 6 (L) 7.0 - 18 MG/DL    Creatinine 0.80 0.6 - 1.3 MG/DL    Bun/Cre Ratio 8 (L) 12 - 20      Est, Glom Filt Rate >60 >60 Healthcare Providers: [email or web address]         Blood Culture 2 [[id number]] Collected: 03/26/23 2020    Order Status: Completed Specimen: Blood Updated: 03/28/23 1134     Special Requests NO SPECIAL REQUESTS        Culture NO GROWTH 2 DAYS       Blood Culture 1 [[id number]] Collected: 03/26/23 2018    Order Status: Completed Specimen: Blood Updated: 03/28/23 1134     Special Requests NO SPECIAL REQUESTS        Culture NO GROWTH 2 DAYS                XR CHEST PORTABLE  Result Date: 3/26/2023  Bilateral midlung and bibasilar increased interstitial markings. Right midlung mild patchy airspace disease. CT CHEST ABDOMEN PELVIS WO CONTRAST Additional Contrast? None  Result Date: 3/26/2023  1. Multifocal areas of nodular airspace disease/centrilobular nodularity in both lungs with bronchial wall thickening and mucoid impaction. 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 2. Cholelithiasis. 3. Colonic diverticulosis without diverticulitis. Please note: Voice-recognition software may have been used to generate this report, which may have resulted in some phonetic-based errors in grammar and contents. Even though attempts were made to correct all the mistakes, some may have been missed, and remained in the body of the document.     Pamela Dawson MD  3/28/2023

## 2023-03-28 NOTE — PROGRESS NOTES
Hospitalist Progress Note    Patient: Adilene Grewal MRN: [id number]  CSN: [id number]    YOB: 1950  Age: 67 y.o. Sex: female    DOA: 3/26/2023 LOS:  LOS: 1 day          Chief Complaint:    Hypoxemia    Assessment/Plan     79-year-old female with history of COPD, hypertension, previous respiratory failure with hypoxia, chronic urinary retention who self caths.   Admitted for:      Hypoxemia -arterial blood gas shows a PO2 of 59% on room air  on admission   O2 supplementation   Re-check ABG this am   02 supplementation to keep 02 >91%  Good pulmonary toilet, HOB >30%    Patient with COPD-stage III/severe by gold staging; not on home O2  Brovana and budesonide nebs twice daily  Follows with pulmonology in [facility]    Abnormal bilateral lung infiltrates -  CT chest-right upper lobe peripheral patchy opacities; scattered bilateral infiltrates; minimal right middle lobe and lingula bronchiectasis; no focal consolidation; no pleural effusions   Prior history of TB exposure and positive PPD   Appreciate pulmonology and infectious disease expertise, Dr. Lucy Hutchison and Dr. Shamir Lozada   Differential diagnosis include chronic bacterial infection versus Mycobacterium etiology-TB  versus NTM versus fungal etiology versus malignancy  Procalcitonin normal  Urine Legionella and strep antigen was negative  Continue empiric antibiotics with ceftriaxone and doxycycline, avoid fluoroquinolones and Zithromax  AFB x3 sputum, may need sputum induction as ordered  QuantiFERON TB pending  Fungitell and galactomannan and crypto antigen ordered by ID    Multiple electrolyte abnormalities -  Sodium better  Potassium better  Magnesium  resolved     Chronic anemia -  Iron level low, will replete  Follow CBC    UTI -  Follow urine culture   Continue IV rocephin     Hypertension -  Monitor BP and continue Lopressor    Chronic urinary retention who self caths -  Monitor for urine retention with bladder scan q4 hrs 3.7  --   --    LABGLOM 54* >60 >60        CBC w/Diff Recent Labs     03/26/23 2020 03/28/23  0446   WBC 10.5 7.0   RBC 3.58* 3.01*   HGB 10.8* 9.0*   HCT 33.2* 29.1*    167   LYMPHOPCT 8* 22        Cardiac Enzymes No results for input(s): CKTOTAL, CKMB, CKMBINDEX, TROPONINI, TELMA in the last 72 hours. Coagulation Recent Labs     03/26/23 2020   PROTIME 14.7   INR 1.1   APTT 24.3         Lipid Panel No results found for: CHOL, TRIG, HDL, LDLCHOLESTEROL, LDLCALC, LABVLDL, VLDL, CHOLHDLRATIO   BNP No results for input(s): BNP in the last 72 hours.    Liver Enzymes Recent Labs     03/26/23 2020   ALT 13   AST 16   ALKPHOS 85   BILITOT 0.4        Thyroid Studies No results found for: T2ETEQX, O3EMCOP, FT3, T4FREE, TSH       Procedures/imaging: see electronic medical records for all procedures/Xrays and details which were not copied into this note but were reviewed prior to creation of Plan      Danny Stevens MD

## 2023-03-29 ENCOUNTER — APPOINTMENT (OUTPATIENT)
Facility: HOSPITAL | Age: 73
DRG: 191 | End: 2023-03-29
Payer: MEDICARE

## 2023-03-29 LAB
ANION GAP SERPL CALC-SCNC: 4 MMOL/L (ref 3–18)
BASOPHILS # BLD: 0.1 K/UL (ref 0–0.1)
BASOPHILS NFR BLD: 1 % (ref 0–2)
BUN SERPL-MCNC: 9 MG/DL (ref 7–18)
BUN/CREAT SERPL: 10 (ref 12–20)
CALCIUM SERPL-MCNC: 8.6 MG/DL (ref 8.5–10.1)
CHLORIDE SERPL-SCNC: 101 MMOL/L (ref 100–111)
CO2 SERPL-SCNC: 28 MMOL/L (ref 21–32)
CREAT SERPL-MCNC: 0.87 MG/DL (ref 0.6–1.3)
CRYPTOC AG SER QL IA: NEGATIVE
DIFFERENTIAL METHOD BLD: ABNORMAL
EOSINOPHIL # BLD: 0.8 K/UL (ref 0–0.4)
EOSINOPHIL NFR BLD: 12 % (ref 0–5)
ERYTHROCYTE [DISTWIDTH] IN BLOOD BY AUTOMATED COUNT: 13.7 % (ref 11.6–14.5)
GLUCOSE SERPL-MCNC: 92 MG/DL (ref 74–99)
HCT VFR BLD AUTO: 29.5 % (ref 35–45)
HGB BLD-MCNC: 9.3 G/DL (ref 12–16)
IMM GRANULOCYTES # BLD AUTO: 0 K/UL (ref 0–0.04)
IMM GRANULOCYTES NFR BLD AUTO: 0 % (ref 0–0.5)
LYMPHOCYTES # BLD: 1.5 K/UL (ref 0.9–3.6)
LYMPHOCYTES NFR BLD: 22 % (ref 21–52)
MCH RBC QN AUTO: 30.6 PG (ref 24–34)
MCHC RBC AUTO-ENTMCNC: 31.5 G/DL (ref 31–37)
MCV RBC AUTO: 97 FL (ref 78–100)
MONOCYTES # BLD: 0.6 K/UL (ref 0.05–1.2)
MONOCYTES NFR BLD: 9 % (ref 3–10)
NEUTS SEG # BLD: 3.9 K/UL (ref 1.8–8)
NEUTS SEG NFR BLD: 56 % (ref 40–73)
NRBC # BLD: 0 K/UL (ref 0–0.01)
NRBC BLD-RTO: 0 PER 100 WBC
PLATELET # BLD AUTO: 174 K/UL (ref 135–420)
PMV BLD AUTO: 10.5 FL (ref 9.2–11.8)
POTASSIUM SERPL-SCNC: 3.8 MMOL/L (ref 3.5–5.5)
RBC # BLD AUTO: 3.04 M/UL (ref 4.2–5.3)
SODIUM SERPL-SCNC: 133 MMOL/L (ref 136–145)
WBC # BLD AUTO: 7 K/UL (ref 4.6–13.2)

## 2023-03-29 PROCEDURE — 1100000003 HC PRIVATE W/ TELEMETRY

## 2023-03-29 PROCEDURE — 80048 BASIC METABOLIC PNL TOTAL CA: CPT

## 2023-03-29 PROCEDURE — 2580000003 HC RX 258: Performed by: FAMILY MEDICINE

## 2023-03-29 PROCEDURE — 36415 COLL VENOUS BLD VENIPUNCTURE: CPT

## 2023-03-29 PROCEDURE — 87116 MYCOBACTERIA CULTURE: CPT

## 2023-03-29 PROCEDURE — 94640 AIRWAY INHALATION TREATMENT: CPT

## 2023-03-29 PROCEDURE — 51702 INSERT TEMP BLADDER CATH: CPT

## 2023-03-29 PROCEDURE — 2700000000 HC OXYGEN THERAPY PER DAY

## 2023-03-29 PROCEDURE — 6370000000 HC RX 637 (ALT 250 FOR IP): Performed by: INTERNAL MEDICINE

## 2023-03-29 PROCEDURE — 85025 COMPLETE CBC W/AUTO DIFF WBC: CPT

## 2023-03-29 PROCEDURE — 87305 ASPERGILLUS AG IA: CPT

## 2023-03-29 PROCEDURE — 87327 CRYPTOCOCCUS NEOFORM AG IA: CPT

## 2023-03-29 PROCEDURE — 6360000002 HC RX W HCPCS: Performed by: FAMILY MEDICINE

## 2023-03-29 PROCEDURE — 87449 NOS EACH ORGANISM AG IA: CPT

## 2023-03-29 PROCEDURE — 6370000000 HC RX 637 (ALT 250 FOR IP): Performed by: FAMILY MEDICINE

## 2023-03-29 PROCEDURE — 71045 X-RAY EXAM CHEST 1 VIEW: CPT

## 2023-03-29 PROCEDURE — 6360000002 HC RX W HCPCS: Performed by: INTERNAL MEDICINE

## 2023-03-29 RX ORDER — SODIUM CHLORIDE 9 MG/ML
INJECTION, SOLUTION INTRAVENOUS PRN
OUTPATIENT
Start: 2023-03-29

## 2023-03-29 RX ORDER — SODIUM CHLORIDE 0.9 % (FLUSH) 0.9 %
5-40 SYRINGE (ML) INJECTION EVERY 12 HOURS SCHEDULED
OUTPATIENT
Start: 2023-03-29

## 2023-03-29 RX ORDER — IPRATROPIUM BROMIDE AND ALBUTEROL SULFATE 2.5; .5 MG/3ML; MG/3ML
1 SOLUTION RESPIRATORY (INHALATION) 4 TIMES DAILY
Status: DISCONTINUED | OUTPATIENT
Start: 2023-03-29 | End: 2023-03-31 | Stop reason: HOSPADM

## 2023-03-29 RX ORDER — SODIUM CHLORIDE 0.9 % (FLUSH) 0.9 %
5-40 SYRINGE (ML) INJECTION PRN
OUTPATIENT
Start: 2023-03-29

## 2023-03-29 RX ADMIN — BUDESONIDE 500 MCG: 0.5 INHALANT RESPIRATORY (INHALATION) at 08:01

## 2023-03-29 RX ADMIN — ENOXAPARIN SODIUM 40 MG: 100 INJECTION SUBCUTANEOUS at 08:23

## 2023-03-29 RX ADMIN — SODIUM CHLORIDE, PRESERVATIVE FREE 10 ML: 5 INJECTION INTRAVENOUS at 20:38

## 2023-03-29 RX ADMIN — DOXYCYCLINE 100 MG: 100 CAPSULE ORAL at 18:21

## 2023-03-29 RX ADMIN — METOPROLOL TARTRATE 50 MG: 50 TABLET ORAL at 08:23

## 2023-03-29 RX ADMIN — IPRATROPIUM BROMIDE AND ALBUTEROL SULFATE 1 AMPULE: .5; 3 SOLUTION RESPIRATORY (INHALATION) at 20:54

## 2023-03-29 RX ADMIN — ARFORMOTEROL TARTRATE 15 MCG: 15 SOLUTION RESPIRATORY (INHALATION) at 20:54

## 2023-03-29 RX ADMIN — DOXYCYCLINE 100 MG: 100 CAPSULE ORAL at 06:31

## 2023-03-29 RX ADMIN — CETIRIZINE HYDROCHLORIDE 10 MG: 10 TABLET, FILM COATED ORAL at 08:24

## 2023-03-29 RX ADMIN — ARFORMOTEROL TARTRATE 15 MCG: 15 SOLUTION RESPIRATORY (INHALATION) at 08:01

## 2023-03-29 RX ADMIN — IPRATROPIUM BROMIDE AND ALBUTEROL SULFATE 1 AMPULE: .5; 3 SOLUTION RESPIRATORY (INHALATION) at 15:26

## 2023-03-29 RX ADMIN — BUDESONIDE 500 MCG: 0.5 INHALANT RESPIRATORY (INHALATION) at 20:53

## 2023-03-29 RX ADMIN — FAMOTIDINE 20 MG: 20 TABLET, FILM COATED ORAL at 08:24

## 2023-03-29 RX ADMIN — ASPIRIN 81 MG: 81 TABLET, CHEWABLE ORAL at 08:24

## 2023-03-29 RX ADMIN — SODIUM CHLORIDE, PRESERVATIVE FREE 10 ML: 5 INJECTION INTRAVENOUS at 08:25

## 2023-03-29 ASSESSMENT — PAIN SCALES - GENERAL
PAINLEVEL_OUTOF10: 0
PAINLEVEL_OUTOF10: 0

## 2023-03-29 NOTE — PROGRESS NOTES
Chart reviewed- plans for bronch tomorrow  Labs reviewed  -- serology in progress    Please obtain routine bacterial cutlures, AFB stain and culture, MTB PCR, fungal culture /Stain , galactomannan from BAL, and cytology  Thanks    Eduarda Oswald MD  Office #:     [phone]-[facility] #8   Office Fax: [phone]

## 2023-03-29 NOTE — PLAN OF CARE
Problem: Discharge Planning  Goal: Discharge to home or other facility with appropriate resources  3/29/2023 0954 by Eli Hernandez RN  Outcome: Progressing  Flowsheets (Taken 3/29/2023 0815)  Discharge to home or other facility with appropriate resources: Identify barriers to discharge with patient and caregiver  3/28/2023 2313 by Dora Hernandez RN  Outcome: Progressing  Flowsheets (Taken 3/28/2023 2105)  Discharge to home or other facility with appropriate resources:   Identify barriers to discharge with patient and caregiver   Arrange for needed discharge resources and transportation as appropriate     Problem: Pain  Goal: Verbalizes/displays adequate comfort level or baseline comfort level  3/29/2023 0954 by Eli Hernandez RN  Outcome: Progressing  3/28/2023 2313 by Dora Hernandez RN  Outcome: Progressing     Problem: Safety - Adult  Goal: Free from fall injury  3/29/2023 0954 by Eli Hernandez RN  Outcome: Progressing  3/28/2023 2313 by Dora Hernandez RN  Outcome: Progressing     Problem: ABCDS Injury Assessment  Goal: Absence of physical injury  3/29/2023 0954 by Eli Hernandez RN  Outcome: Progressing  3/28/2023 2313 by Dora Hernandez RN  Outcome: Progressing

## 2023-03-29 NOTE — PROGRESS NOTES
[J96.01] 03/27/2023    Bronchiectasis with acute exacerbation ([facility] [place] [facility] [J47.1] 03/27/2023    Anemia [D64.9] 03/27/2023    Chronic retention of urine [R33.9] 03/27/2023    Self-catheterizes urinary bladder [Z78.9] 03/27/2023    Pulmonary infiltrates [R91.8] 03/27/2023    COPD exacerbation ([facility] [place] [facility] [J44.1]     Hypertension [I10]        Disposition : TBD     Subjective:    Disappointed that she cannot produce sputum  Wants to know what is going on with her lungs  \" I know I do not have TB. 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   Last three shifts:  03/27 1901 - 03/29 0700  In: -   Out: 4400 [Urine:4400]    Exam:    General: Well developed, alert, NAD, OX3, debiliated WF  Head/Neck: NCAT, supple, No masses, No lymphadenopathy  CVS:Regular rate and rhythm, no M/R/G, S1/S2 heard, no thrill  Lungs:Clear to auscultation bilaterally, no wheezes, rhonchi, or rales  Abdomen: Soft, Nontender, No distention, Normal Bowel sounds, No hepatomegaly  Extremities: No C/C/E, pulses palpable 2+  Skin:normal texture and turgor, no rashes, no lesions  Neuro:grossly normal , follows commands  Psych:appropriate    Labs: Results:       Chemistry Recent Labs     03/26/23  2020 03/27/23  0420 03/28/23  0446 03/29/23  0545   GLUCOSE 119* 113* 99 92   * 133* 133* 133*   K 3.3* 3.0* 3.7 3.8   CL 95* 98* 102 101   CO2 28 30 27 28   BUN 7 5* 6* 9   CREATININE 1.09 0.91 0.80 0.87   CALCIUM 8.6 8.7 8.2* 8.6   BILITOT 0.4  --   --   --    AST 16  -- --   --    ALT 13  --   --   --    ALKPHOS 85  --   --   --    PROT 7.0  --   --   --    GLOB 3.7  --   --   --    LABGLOM 54* >60 >60 >60        CBC w/Diff Recent Labs     03/26/23 2020 03/28/23  0446 03/29/23  0545   WBC 10.5 7.0 7.0   RBC 3.58* 3.01* 3.04*   HGB 10.8* 9.0* 9.3*   HCT 33.2* 29.1* 29.5*    167 174   LYMPHOPCT 8* 22 22        Cardiac Enzymes No results for input(s): CKTOTAL, CKMB, CKMBINDEX, TROPONINI, TELMA in the last 72 hours. Coagulation Recent Labs     03/26/23 2020   PROTIME 14.7   INR 1.1   APTT 24.3         Lipid Panel No results found for: CHOL, TRIG, HDL, LDLCHOLESTEROL, LDLCALC, LABVLDL, VLDL, CHOLHDLRATIO   BNP No results for input(s): BNP in the last 72 hours.    Liver Enzymes Recent Labs     03/26/23 2020   ALT 13   AST 16   ALKPHOS 85   BILITOT 0.4        Thyroid Studies No results found for: E6VOHHR, N8FNGUJ, FT3, T4FREE, TSH       Procedures/imaging: see electronic medical records for all procedures/Xrays and details which were not copied into this note but were reviewed prior to creation of Plan      Will Goddard MD

## 2023-03-29 NOTE — CARE COORDINATION
D/c plan; Home w/ [address]. CM spoke w/ pt. Discussed PT's recommendation of HH. Pt is in agreement w/ HH at d/c. Discussed FOC. Pt states she has had [address] previously and would like to use them again. CMS to send referral. Family to transport home when the pt is medically stable. CM Will continue to follow.

## 2023-03-29 NOTE — PROGRESS NOTES
Occupational Therapy Goals:  Initiated 3/29/2023 to be met within 7-10 days. OCCUPATIONAL THERAPY EVALUATION    Patient: Rachel Bailey (89 y.o. female)  Date: 3/29/2023  Primary Diagnosis: Hypokalemia [E87.6]  Hypomagnesemia [E83.42]  Hyponatremia [E87.1]  Hypoxia [R09.02]  Urinary tract infection with hematuria, site unspecified [N39.0, R31.9]  Pneumonia of right middle lobe due to infectious organism [J18.9]       Precautions: Fall Risk,  ,  ,  ,  ,  ,  ,    PLOF: Pt was mod I prior to hospitalization    ASSESSMENT :   ,  Pt presents supine in bed, resting on 2 L of O2, stat at 100%. Pt able to sit EOB with SBA, don socks with SBA, has SOB sensation, no drop in O2 noted. Good sitting balance bending forward and side. O2 bumped down to 1.5L able to perform sit to stand and ambulate in room with additional time, rest breaks with O2 drop to 95%. Able to sit EOB for 5 min, end of session. Recommend HH upon d/c    DEFICITS/IMPAIRMENTS:  Performance deficits / Impairments: Decreased ADL status; Decreased functional mobility ; Decreased strength;Decreased balance;Decreased endurance;Decreased high-level IADLs;Decreased coordination    Patient will benefit from skilled intervention to address the above impairments. Patient's rehabilitation potential is considered to be Prognosis: Good. Factors which may influence rehabilitation potential include:   []             None noted  []             Mental ability/status  [x]             Medical condition  [x]             Home/family situation and support systems  [x]             Safety awareness  [x]             Pain tolerance/management  []             Other:      PLAN :  Recommendations and Planned Interventions:   Strengthening;ROM;Balance training;Functional mobility training; Endurance training; Wheelchair mobility training; Safety education & training;Neuromuscular re-education;Equipment evaluation, education, & procurement;Self-Care / ADL; Patient/Caregiver education & greater is associated with a discharge to the patient's home setting. Based on an AM-PAC score of **/24 and their current ADL deficits; it is recommended that the patient have 2-3 sessions per week of Occupational Therapy at d/c to increase the patient's independence. At this time and based on an AM-PAC score of **/24, no further OT is recommended upon discharge due to (i.e. patient at baseline functional statusetc). Recommend patient returns to prior setting with prior services.

## 2023-03-29 NOTE — PROGRESS NOTES
Pulmonary Specialists  Pulmonary, Critical Care, and Sleep Medicine    Name: Dario Bruno MRN: [id number]   : 1950 Hospital: [facility] [place]    Date: 3/29/2023  Room: [facility]/[address] [place] [address] Note                                                                             Consult requesting physician: Dr. Dr Beatris Perry  Reason for Consult: Abnormal CT chest    IMPRESSION:     Pulmonary infiltrates  Bronchiectasis  COPD    Patient Active Problem List   Diagnosis Code    COPD exacerbation ([facility] [place] [address] [facility] J44.1    Hypertension I10    Hypoxia R09.02    Acute hypoxemic respiratory failure (HCC) J96.01    Bronchiectasis with acute exacerbation (HCC) J47.1    Anemia D64.9    Chronic retention of urine R33.9    Self-catheterizes urinary bladder Z78.9    Pulmonary infiltrates R91.8       Code status: DNR       RECOMMENDATIONS:     Patient with COPD-stage III/severe by gold staging; not on home O2. Prior history of TB exposure and positive PPD. CT chest 3/26-reviewed images and report-right upper lobe peripheral patchy opacities; scattered bilateral infiltrates; minimal right middle lobe and lingula bronchiectasis; no focal consolidation; no pleural effusions. Sputum AFB cultures pending; RT not able to induce sputum; plan for bronch and lavage tomorrow; discussed procedure with patient and patient agreeable. QuantiFERON-TB gold testing- result pending. Fungitell, galactomannan and cryptococcal antigen-pending. Micro reviewed - negative so far. Empiric antibiotic-doxycycline per ID. Bronchodilators-Brovana and budesonide nebs twice daily. Added duoneb 4 times daily. DVT prophylaxis-enoxaparin (hold tomorrow for bronch). GI prophylaxis-Pepcid. Discussed with ID; likely bronchoscopy later this week to rule out pulmonary TB. Chest x-ray today- hyperinflated; scattered bilateral peripehral infiltrates; no focal consolidations; no pleural effusions.     Blood work reviewed-normal kidney A By PCR Not detected        Rapid Influenza B By PCR Not detected        Comment: Testing was performed using royce Gretel SARS-CoV-2 and Influenza A/B nucleic acid assay. This test is a multiplex Real-Time Reverse Transcriptase Polymerase Chain Reaction (RT-PCR) based in vitro diagnostic test intended for the qualitative detection of nucleic acids from SARS-CoV-2, Influenza A, and Influenza B in nasopharyngeal for use under the FDA's Emergency Use Authorization(EAU) only. Fact sheet for Patients: [employer]  Fact sheet for Healthcare Providers: [employer]         Blood Culture 2 [[id number]] Collected: 03/26/23 2020    Order Status: Completed Specimen: Blood Updated: 03/29/23 0916     Special Requests NO SPECIAL REQUESTS        Culture NO GROWTH 3 DAYS       Blood Culture 1 [[id number]] Collected: 03/26/23 2018    Order Status: Completed Specimen: Blood Updated: 03/29/23 0916     Special Requests NO SPECIAL REQUESTS        Culture NO GROWTH 3 DAYS                    CT CHEST ABDOMEN PELVIS WO CONTRAST Additional Contrast? None  Result Date: 3/26/2023  1. Multifocal areas of nodular airspace disease/centrilobular nodularity in both lungs with bronchial wall thickening and mucoid impaction. 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 2. Cholelithiasis. 3. Colonic diverticulosis without diverticulitis. XR CHEST PORTABLE  Result Date: 3/29/2023  Narrative   INDICATION:  pulm infiltrates ? TB vs MAC        Exam: Portable chest 0747. Comparison: 3/26/2023. Findings: Cardiomediastinal silhouette is within normal limits. Pulmonary   vasculature is not engorged.  Is subpleural airspace disease in the right mid   chest and subtle subpleural opacities in the left mid chest have not   significantly changed in the interval.

## 2023-03-29 NOTE — PLAN OF CARE
Problem: Discharge Planning  Goal: Discharge to home or other facility with appropriate resources  3/28/2023 2313 by Boby Elena RN  Outcome: Progressing  Flowsheets (Taken 3/28/2023 2105)  Discharge to home or other facility with appropriate resources:   Identify barriers to discharge with patient and caregiver   Arrange for needed discharge resources and transportation as appropriate  3/28/2023 1156 by Eric Sanchez RN  Outcome: Progressing     Problem: Pain  Goal: Verbalizes/displays adequate comfort level or baseline comfort level  3/28/2023 2313 by Boby Elena RN  Outcome: Progressing  3/28/2023 1156 by Eric Sanchez RN  Outcome: Progressing     Problem: Safety - Adult  Goal: Free from fall injury  3/28/2023 2313 by Boby Elena RN  Outcome: Progressing  3/28/2023 1156 by Eric Sanchez RN  Outcome: Progressing     Problem: ABCDS Injury Assessment  Goal: Absence of physical injury  3/28/2023 2313 by Boby Elena RN  Outcome: Progressing  3/28/2023 1156 by Eric Sanchez RN  Outcome: Progressing     Problem: Respiratory - Adult  Goal: Achieves optimal ventilation and oxygenation  3/28/2023 2313 by Boby Elena RN  Outcome: Progressing  Flowsheets (Taken 3/28/2023 2105)  Achieves optimal ventilation and oxygenation:   Assess for changes in mentation and behavior   Assess for changes in respiratory status   Position to facilitate oxygenation and minimize respiratory effort   Oxygen supplementation based on oxygen saturation or arterial blood gases   Assess and instruct to report shortness of breath or any respiratory difficulty   Respiratory therapy support as indicated  3/28/2023 1156 by Eric Sanchez RN  Outcome: Progressing

## 2023-03-29 NOTE — PROGRESS NOTES
Attempted to see patient for PT session. Pt states she is tired and and it too late. Despite encouragement pt refused to participate with PT. Will continue to follow and progress as pt tolerates.   Robert Christie, PT, DPT

## 2023-03-30 ENCOUNTER — APPOINTMENT (OUTPATIENT)
Facility: HOSPITAL | Age: 73
DRG: 191 | End: 2023-03-30
Payer: MEDICARE

## 2023-03-30 ENCOUNTER — ANESTHESIA (OUTPATIENT)
Facility: HOSPITAL | Age: 73
End: 2023-03-30
Payer: MEDICARE

## 2023-03-30 ENCOUNTER — ANESTHESIA EVENT (OUTPATIENT)
Facility: HOSPITAL | Age: 73
End: 2023-03-30
Payer: MEDICARE

## 2023-03-30 LAB
ACID FAST STN SPEC: NORMAL
ANION GAP SERPL CALC-SCNC: 3 MMOL/L (ref 3–18)
APTT PPP: 25.4 SEC (ref 23–36.4)
BASOPHILS # BLD: 0.1 K/UL (ref 0–0.1)
BASOPHILS NFR BLD: 1 % (ref 0–2)
BUN SERPL-MCNC: 12 MG/DL (ref 7–18)
BUN/CREAT SERPL: 14 (ref 12–20)
CALCIUM SERPL-MCNC: 8.4 MG/DL (ref 8.5–10.1)
CHLORIDE SERPL-SCNC: 101 MMOL/L (ref 100–111)
CO2 SERPL-SCNC: 29 MMOL/L (ref 21–32)
CREAT SERPL-MCNC: 0.86 MG/DL (ref 0.6–1.3)
DIFFERENTIAL METHOD BLD: ABNORMAL
EOSINOPHIL # BLD: 0.6 K/UL (ref 0–0.4)
EOSINOPHIL NFR BLD: 9 % (ref 0–5)
ERYTHROCYTE [DISTWIDTH] IN BLOOD BY AUTOMATED COUNT: 13.6 % (ref 11.6–14.5)
GLUCOSE SERPL-MCNC: 94 MG/DL (ref 74–99)
HCT VFR BLD AUTO: 29.4 % (ref 35–45)
HGB BLD-MCNC: 9.3 G/DL (ref 12–16)
IMM GRANULOCYTES # BLD AUTO: 0.1 K/UL (ref 0–0.04)
IMM GRANULOCYTES NFR BLD AUTO: 1 % (ref 0–0.5)
INR PPP: 1.1 (ref 0.8–1.2)
LYMPHOCYTES # BLD: 1.5 K/UL (ref 0.9–3.6)
LYMPHOCYTES NFR BLD: 22 % (ref 21–52)
M TB IFN-G BLD-IMP: POSITIVE
M TB IFN-G CD4+ T-CELLS BLD-ACNC: 0.58 IU/ML
M TBIFN-G CD4+ CD8+T-CELLS BLD-ACNC: 0.34 IU/ML
MCH RBC QN AUTO: 30.4 PG (ref 24–34)
MCHC RBC AUTO-ENTMCNC: 31.6 G/DL (ref 31–37)
MCV RBC AUTO: 96.1 FL (ref 78–100)
MONOCYTES # BLD: 0.5 K/UL (ref 0.05–1.2)
MONOCYTES NFR BLD: 8 % (ref 3–10)
MYCOBACTERIUM SPEC QL CULT: NORMAL
NEUTS SEG # BLD: 4.1 K/UL (ref 1.8–8)
NEUTS SEG NFR BLD: 60 % (ref 40–73)
NRBC # BLD: 0 K/UL (ref 0–0.01)
NRBC BLD-RTO: 0 PER 100 WBC
PLATELET # BLD AUTO: 170 K/UL (ref 135–420)
PMV BLD AUTO: 10.4 FL (ref 9.2–11.8)
POTASSIUM SERPL-SCNC: 3.6 MMOL/L (ref 3.5–5.5)
PROTHROMBIN TIME: 14.6 SEC (ref 11.5–15.2)
QUANTIFERON CRITERIA: ABNORMAL
QUANTIFERON MITOGEN VALUE: >10 IU/ML
QUANTIFERON NIL VALUE: 0.06 IU/ML
RBC # BLD AUTO: 3.06 M/UL (ref 4.2–5.3)
SODIUM SERPL-SCNC: 133 MMOL/L (ref 136–145)
SPECIMEN PREPARATION: NORMAL
WBC # BLD AUTO: 6.8 K/UL (ref 4.6–13.2)

## 2023-03-30 PROCEDURE — 7100000001 HC PACU RECOVERY - ADDTL 15 MIN: Performed by: INTERNAL MEDICINE

## 2023-03-30 PROCEDURE — 87070 CULTURE OTHR SPECIMN AEROBIC: CPT

## 2023-03-30 PROCEDURE — 1100000003 HC PRIVATE W/ TELEMETRY

## 2023-03-30 PROCEDURE — 3700000000 HC ANESTHESIA ATTENDED CARE: Performed by: INTERNAL MEDICINE

## 2023-03-30 PROCEDURE — 6370000000 HC RX 637 (ALT 250 FOR IP): Performed by: INTERNAL MEDICINE

## 2023-03-30 PROCEDURE — 87077 CULTURE AEROBIC IDENTIFY: CPT

## 2023-03-30 PROCEDURE — 87116 MYCOBACTERIA CULTURE: CPT

## 2023-03-30 PROCEDURE — 80048 BASIC METABOLIC PNL TOTAL CA: CPT

## 2023-03-30 PROCEDURE — 87798 DETECT AGENT NOS DNA AMP: CPT

## 2023-03-30 PROCEDURE — 85025 COMPLETE CBC W/AUTO DIFF WBC: CPT

## 2023-03-30 PROCEDURE — 6360000002 HC RX W HCPCS

## 2023-03-30 PROCEDURE — 85730 THROMBOPLASTIN TIME PARTIAL: CPT

## 2023-03-30 PROCEDURE — 2709999900 HC NON-CHARGEABLE SUPPLY: Performed by: INTERNAL MEDICINE

## 2023-03-30 PROCEDURE — 87186 SC STD MICRODIL/AGAR DIL: CPT

## 2023-03-30 PROCEDURE — 0B9D8ZX DRAINAGE OF RIGHT MIDDLE LUNG LOBE, VIA NATURAL OR ARTIFICIAL OPENING ENDOSCOPIC, DIAGNOSTIC: ICD-10-PCS | Performed by: INTERNAL MEDICINE

## 2023-03-30 PROCEDURE — 85610 PROTHROMBIN TIME: CPT

## 2023-03-30 PROCEDURE — 0B9C8ZX DRAINAGE OF RIGHT UPPER LUNG LOBE, VIA NATURAL OR ARTIFICIAL OPENING ENDOSCOPIC, DIAGNOSTIC: ICD-10-PCS | Performed by: INTERNAL MEDICINE

## 2023-03-30 PROCEDURE — 3600007512: Performed by: INTERNAL MEDICINE

## 2023-03-30 PROCEDURE — 88112 CYTOPATH CELL ENHANCE TECH: CPT

## 2023-03-30 PROCEDURE — 71046 X-RAY EXAM CHEST 2 VIEWS: CPT

## 2023-03-30 PROCEDURE — 71045 X-RAY EXAM CHEST 1 VIEW: CPT

## 2023-03-30 PROCEDURE — 51702 INSERT TEMP BLADDER CATH: CPT

## 2023-03-30 PROCEDURE — 87206 SMEAR FLUORESCENT/ACID STAI: CPT

## 2023-03-30 PROCEDURE — 2580000003 HC RX 258: Performed by: FAMILY MEDICINE

## 2023-03-30 PROCEDURE — 6360000002 HC RX W HCPCS: Performed by: INTERNAL MEDICINE

## 2023-03-30 PROCEDURE — 2580000003 HC RX 258

## 2023-03-30 PROCEDURE — 2700000000 HC OXYGEN THERAPY PER DAY

## 2023-03-30 PROCEDURE — 87305 ASPERGILLUS AG IA: CPT

## 2023-03-30 PROCEDURE — 6370000000 HC RX 637 (ALT 250 FOR IP): Performed by: FAMILY MEDICINE

## 2023-03-30 PROCEDURE — 2500000003 HC RX 250 WO HCPCS

## 2023-03-30 PROCEDURE — 87556 M.TUBERCULO DNA AMP PROBE: CPT

## 2023-03-30 PROCEDURE — 7100000010 HC PHASE II RECOVERY - FIRST 15 MIN: Performed by: INTERNAL MEDICINE

## 2023-03-30 PROCEDURE — 3700000001 HC ADD 15 MINUTES (ANESTHESIA): Performed by: INTERNAL MEDICINE

## 2023-03-30 PROCEDURE — 94640 AIRWAY INHALATION TREATMENT: CPT

## 2023-03-30 PROCEDURE — 3600007502: Performed by: INTERNAL MEDICINE

## 2023-03-30 PROCEDURE — 7100000011 HC PHASE II RECOVERY - ADDTL 15 MIN: Performed by: INTERNAL MEDICINE

## 2023-03-30 PROCEDURE — 7100000000 HC PACU RECOVERY - FIRST 15 MIN: Performed by: INTERNAL MEDICINE

## 2023-03-30 PROCEDURE — 87205 SMEAR GRAM STAIN: CPT

## 2023-03-30 PROCEDURE — 36415 COLL VENOUS BLD VENIPUNCTURE: CPT

## 2023-03-30 PROCEDURE — 87102 FUNGUS ISOLATION CULTURE: CPT

## 2023-03-30 RX ORDER — PROPOFOL 10 MG/ML
INJECTION, EMULSION INTRAVENOUS PRN
Status: DISCONTINUED | OUTPATIENT
Start: 2023-03-30 | End: 2023-03-30 | Stop reason: SDUPTHER

## 2023-03-30 RX ORDER — DEXAMETHASONE SODIUM PHOSPHATE 10 MG/ML
INJECTION, SOLUTION INTRAMUSCULAR; INTRAVENOUS PRN
Status: DISCONTINUED | OUTPATIENT
Start: 2023-03-30 | End: 2023-03-30 | Stop reason: SDUPTHER

## 2023-03-30 RX ORDER — ROCURONIUM BROMIDE 10 MG/ML
INJECTION, SOLUTION INTRAVENOUS PRN
Status: DISCONTINUED | OUTPATIENT
Start: 2023-03-30 | End: 2023-03-30 | Stop reason: SDUPTHER

## 2023-03-30 RX ORDER — MIDAZOLAM HYDROCHLORIDE 1 MG/ML
INJECTION INTRAMUSCULAR; INTRAVENOUS PRN
Status: DISCONTINUED | OUTPATIENT
Start: 2023-03-30 | End: 2023-03-30 | Stop reason: SDUPTHER

## 2023-03-30 RX ORDER — LIDOCAINE HYDROCHLORIDE 20 MG/ML
INJECTION, SOLUTION INFILTRATION; PERINEURAL PRN
Status: DISCONTINUED | OUTPATIENT
Start: 2023-03-30 | End: 2023-03-30 | Stop reason: SDUPTHER

## 2023-03-30 RX ORDER — SODIUM CHLORIDE 9 MG/ML
INJECTION, SOLUTION INTRAVENOUS CONTINUOUS PRN
Status: DISCONTINUED | OUTPATIENT
Start: 2023-03-30 | End: 2023-03-30 | Stop reason: SDUPTHER

## 2023-03-30 RX ORDER — FENTANYL CITRATE 50 UG/ML
INJECTION, SOLUTION INTRAMUSCULAR; INTRAVENOUS PRN
Status: DISCONTINUED | OUTPATIENT
Start: 2023-03-30 | End: 2023-03-30 | Stop reason: SDUPTHER

## 2023-03-30 RX ORDER — ONDANSETRON 2 MG/ML
INJECTION INTRAMUSCULAR; INTRAVENOUS PRN
Status: DISCONTINUED | OUTPATIENT
Start: 2023-03-30 | End: 2023-03-30 | Stop reason: SDUPTHER

## 2023-03-30 RX ORDER — SUCCINYLCHOLINE/SOD CL,ISO/PF 100 MG/5ML
SYRINGE (ML) INTRAVENOUS PRN
Status: DISCONTINUED | OUTPATIENT
Start: 2023-03-30 | End: 2023-03-30 | Stop reason: SDUPTHER

## 2023-03-30 RX ADMIN — FAMOTIDINE 20 MG: 20 TABLET, FILM COATED ORAL at 08:14

## 2023-03-30 RX ADMIN — ARFORMOTEROL TARTRATE 15 MCG: 15 SOLUTION RESPIRATORY (INHALATION) at 08:20

## 2023-03-30 RX ADMIN — METOPROLOL TARTRATE 50 MG: 50 TABLET ORAL at 19:45

## 2023-03-30 RX ADMIN — BUDESONIDE 500 MCG: 0.5 INHALANT RESPIRATORY (INHALATION) at 19:39

## 2023-03-30 RX ADMIN — IPRATROPIUM BROMIDE AND ALBUTEROL SULFATE 1 AMPULE: .5; 3 SOLUTION RESPIRATORY (INHALATION) at 17:20

## 2023-03-30 RX ADMIN — ARFORMOTEROL TARTRATE 15 MCG: 15 SOLUTION RESPIRATORY (INHALATION) at 19:39

## 2023-03-30 RX ADMIN — SODIUM CHLORIDE: 900 INJECTION, SOLUTION INTRAVENOUS at 14:53

## 2023-03-30 RX ADMIN — ASPIRIN 81 MG: 81 TABLET, CHEWABLE ORAL at 08:15

## 2023-03-30 RX ADMIN — MIDAZOLAM 2 MG: 1 INJECTION INTRAMUSCULAR; INTRAVENOUS at 14:53

## 2023-03-30 RX ADMIN — IPRATROPIUM BROMIDE AND ALBUTEROL SULFATE 1 AMPULE: .5; 3 SOLUTION RESPIRATORY (INHALATION) at 19:39

## 2023-03-30 RX ADMIN — CETIRIZINE HYDROCHLORIDE 10 MG: 10 TABLET, FILM COATED ORAL at 08:14

## 2023-03-30 RX ADMIN — PROPOFOL 100 MG: 10 INJECTION, EMULSION INTRAVENOUS at 15:02

## 2023-03-30 RX ADMIN — METOPROLOL TARTRATE 50 MG: 50 TABLET ORAL at 08:14

## 2023-03-30 RX ADMIN — SODIUM CHLORIDE, PRESERVATIVE FREE 10 ML: 5 INJECTION INTRAVENOUS at 08:15

## 2023-03-30 RX ADMIN — FENTANYL CITRATE 50 MCG: 50 INJECTION, SOLUTION INTRAMUSCULAR; INTRAVENOUS at 14:57

## 2023-03-30 RX ADMIN — Medication 100 MG: at 15:03

## 2023-03-30 RX ADMIN — IPRATROPIUM BROMIDE AND ALBUTEROL SULFATE 1 AMPULE: .5; 3 SOLUTION RESPIRATORY (INHALATION) at 12:01

## 2023-03-30 RX ADMIN — BUDESONIDE 500 MCG: 0.5 INHALANT RESPIRATORY (INHALATION) at 08:20

## 2023-03-30 RX ADMIN — ROCURONIUM BROMIDE 5 MG: 10 INJECTION, SOLUTION INTRAVENOUS at 14:59

## 2023-03-30 RX ADMIN — DOXYCYCLINE 100 MG: 100 CAPSULE ORAL at 06:33

## 2023-03-30 RX ADMIN — DOXYCYCLINE 100 MG: 100 CAPSULE ORAL at 18:16

## 2023-03-30 RX ADMIN — IPRATROPIUM BROMIDE AND ALBUTEROL SULFATE 1 AMPULE: .5; 3 SOLUTION RESPIRATORY (INHALATION) at 08:19

## 2023-03-30 RX ADMIN — DEXAMETHASONE SODIUM PHOSPHATE 5 MG: 10 INJECTION, SOLUTION INTRAMUSCULAR; INTRAVENOUS at 15:15

## 2023-03-30 RX ADMIN — PROPOFOL 100 MG: 10 INJECTION, EMULSION INTRAVENOUS at 15:03

## 2023-03-30 RX ADMIN — ONDANSETRON HYDROCHLORIDE 4 MG: 2 INJECTION INTRAMUSCULAR; INTRAVENOUS at 15:15

## 2023-03-30 RX ADMIN — LIDOCAINE HYDROCHLORIDE 100 MG: 20 INJECTION, SOLUTION INFILTRATION; PERINEURAL at 15:02

## 2023-03-30 ASSESSMENT — PAIN SCALES - GENERAL
PAINLEVEL_OUTOF10: 0

## 2023-03-30 NOTE — PERIOP NOTE
Patient transported from Endoscopy to room 331 via bed on 4 L nasal cannula. Report given to Jen Reynolds ,RN regarding procedure, physician findings, and post procedure care instructions. Patient a/ox4, vitals stable, chart and belongings in possession.

## 2023-03-30 NOTE — PLAN OF CARE
Problem: Discharge Planning  Goal: Discharge to home or other facility with appropriate resources  3/30/2023 1011 by Remi Berg RN  Outcome: Progressing  Flowsheets (Taken 3/30/2023 0800)  Discharge to home or other facility with appropriate resources: Identify barriers to discharge with patient and caregiver  3/29/2023 2330 by Annabell Kussmaul, RN  Outcome: Progressing  Flowsheets (Taken 3/29/2023 2038)  Discharge to home or other facility with appropriate resources:   Identify barriers to discharge with patient and caregiver   Arrange for needed discharge resources and transportation as appropriate     Problem: Pain  Goal: Verbalizes/displays adequate comfort level or baseline comfort level  3/30/2023 1011 by Remi Berg RN  Outcome: Progressing  Flowsheets (Taken 3/30/2023 0800)  Verbalizes/displays adequate comfort level or baseline comfort level: Encourage patient to monitor pain and request assistance  3/29/2023 2330 by Annabell Kussmaul, RN  Outcome: Progressing     Problem: Safety - Adult  Goal: Free from fall injury  3/30/2023 1011 by Remi Berg RN  Outcome: Progressing  3/29/2023 2330 by Annabell Kussmaul, RN  Outcome: Progressing     Problem: ABCDS Injury Assessment  Goal: Absence of physical injury  3/30/2023 1011 by Remi Berg RN  Outcome: Progressing  3/29/2023 2330 by Annabell Kussmaul, RN  Outcome: Progressing

## 2023-03-30 NOTE — PROGRESS NOTES
Pulmonary    Chart reviewed; consultation and H&P reviewed; patient appropriate for bronchoscopy with BAL.     Julio Fernandez MD

## 2023-03-30 NOTE — ANESTHESIA POSTPROCEDURE EVALUATION
Post-Anesthesia Evaluation & Assessment    Vitals  BP: (!) 122/56  Temp: 97.4 °F (36.3 °C)  Temp Source: Oral  Heart Rate: 55  Resp: 18  SpO2: 98 %  Height: 5' 7\" (170.2 cm)  Weight: 183 lb 6.8 oz (83.2 kg)  Pain Level: 0    Nausea/Vomiting: Controlled. Post-operative hydration adequate. Pain managed. Mental status & Level of consciousness: alert and oriented x 3    Neurological status: moves all extremities, sensation grossly intact    Pulmonary status: airway patent, adequate oxygenation. Complications related to anesthesia: none    Patient has met all PACU discharge requirements.       Esme Grijalva DO

## 2023-03-30 NOTE — PROGRESS NOTES
Pulmonary    Name Relation Home Work Mobile   Karlos Taylor [phone]   Yaneli Parr Daughter-in-Law   [phone]     I tried calling patient's son Boni Juárez; went to voicemail after ringing, and could not leave message. I called Joselyn-daughter-in-law; she mentions that Boni Juárez works in a secure facility, and cannot answer phones; daughter-in-law would be the best ; discussed about bronchoscopy procedure, and indication for pulmonary tuberculosis to be ruled out; patient not coughing up sputum to confidently rule out TB or MAC per ID; bronchoscopy procedure planned with anesthesia support; would need to monitor respiratory status closely during the procedure due to history of COPD, currently on 2 L oxygen. Patient is consentable for procedure.     Sagar Gallegos MD

## 2023-03-30 NOTE — DISCHARGE INSTRUCTIONS
You can FU your results with ID- can schedule by calling number below- in 2-3 weeks    MD Ramón Pickett7 Infectious Disease Physician( Timmy Ceja)   [facility] ID clinic-- 2nd Floor- [address] [facility] [address] phone -- [phone]-- for appointments  Nurse line if medical questions OR issues with antibiotics: [phone] # [phone]  Fax #: [phone]

## 2023-03-30 NOTE — PLAN OF CARE
Problem: Discharge Planning  Goal: Discharge to home or other facility with appropriate resources  3/29/2023 2330 by Michele Mccarthy RN  Outcome: Progressing  3/29/2023 0954 by Albert Francis RN  Outcome: Progressing  Flowsheets (Taken 3/29/2023 0815)  Discharge to home or other facility with appropriate resources: Identify barriers to discharge with patient and caregiver     Problem: Pain  Goal: Verbalizes/displays adequate comfort level or baseline comfort level  3/29/2023 2330 by Michele Mccarthy RN  Outcome: Progressing  3/29/2023 0954 by Albert Francis RN  Outcome: Progressing     Problem: Safety - Adult  Goal: Free from fall injury  3/29/2023 2330 by Michele Mccarthy RN  Outcome: Progressing  3/29/2023 0954 by Albert Francis RN  Outcome: Progressing     Problem: ABCDS Injury Assessment  Goal: Absence of physical injury  3/29/2023 2330 by Michele Mccarthy RN  Outcome: Progressing  3/29/2023 0954 by Albert Francis RN  Outcome: Progressing     Problem: Respiratory - Adult  Goal: Achieves optimal ventilation and oxygenation  3/29/2023 2330 by Michele Mccarthy RN  Outcome: Progressing  3/29/2023 0954 by Albert Francis RN  Outcome: Progressing  Flowsheets (Taken 3/29/2023 0815)  Achieves optimal ventilation and oxygenation: Assess for changes in respiratory status

## 2023-03-30 NOTE — PROGRESS NOTES
INR 1.1   APTT 25.4       Lipid Panel No results found for: CHOL, TRIG, HDL, LDLCHOLESTEROL, LDLCALC, LABVLDL, VLDL, CHOLHDLRATIO   BNP No results for input(s): BNP in the last 72 hours. Liver Enzymes No results for input(s): ALT, AST, GGT, ALKPHOS, BILITOT, BILIDIR in the last 72 hours.    Thyroid Studies No results found for: I1HJWUW, R1SCSWY, FT3, T4FREE, TSH       Procedures/imaging: see electronic medical records for all procedures/Xrays and details which were not copied into this note but were reviewed prior to creation of Eva Sanches MD

## 2023-03-30 NOTE — PROCEDURES
[facility]  Pulmonary, Critical Care, and Sleep Medicine    Name: Michelle Isbell MRN: [id number]   : 1950 Hospital: [facility] [place]   Date: 3/30/2023          Diagnostic Bronchoscopy, Bronchio-Alveolar Lavage (BAL)      Pre-procedure diagnosis  1. Pulmonary Infiltrates  2. Positive PPD     Post procedure diagnosis  Same    Procedures  1. Diagnostic Bronchoscopy  2. Bronchio-Alveolar Lavage (BAL) from Right upper lobe  3. Bronchio-Alveolar Lavage (BAL) from Right middle lobe    Consent/Treatment: Informed consent was obtained from the  patient after risks, benefits and alternatives were explained. Timeout verified the correct patient and correct procedure. Anesthesia:   General anesthesia with orotracheal ET tube placement. Procedure Details: The flexible bronchoscope was passed through the oral adapter. The scope was passed through the oral tracheal tube into the trachea. Airways surveillance:   -- The mid and distal trachea was normal, main flaquita was normal.   -- The right-sided endobronchial anatomy was completely inspected and were found to be normal.   -- The left-sided endobronchial anatomy was completely inspected and were found to be normal.   -- No bleeding or endobronchial masses or nodules seen. -- Thick yellow mucus secretions in the airways on both sides - suctioned. Specimens / Further Procedures:   Bronchio-Alveolar Lavage (BAL): The bronchoscope was wedged in the Right upper lobe including posterior segments, and bronchoalveolar lavage was performed with 30 cc aliquots times 3; material was sent for aerobic culture, AFB culture, Fungus culture, cytology, Myc. TB PCR, Aspergillus Galactomannan. Bronchio-Alveolar Lavage (BAL):   The bronchoscope was wedged in the Right middle lobe segments and bronchoalveolar lavage was performed with 30 cc aliquots times 3; material was sent for aerobic culture, AFB culture, Fungus culture, cytology, Myc. TB PCR, Aspergillus Galactomannan. Complications: none  No bleeding noted post procedure. Estimated Blood Loss: None    PLAN  Frequent vitals next 2 hrs  CXR 1 view    Addendum-chest x-ray reviewed-both lungs aerated; no lobar atelectasis; scattered bilateral infiltrates; emphysematous background. Patient examined in Endo suite-awake, responsive, stable respirations, stable vital signs, minimal bilateral wheezes, bibasal crackles; nasal cannula oxygen-currently 4 L. Discussed with Endo RN-once patient reaches the telemetry unit, advised to call RT for DuoNeb treatment, incentive spirometry and vital signs every 15 minutes for 1 hour.     Lexus Dockery MD

## 2023-03-30 NOTE — ANESTHESIA PRE PROCEDURE
Department of Anesthesiology  Preprocedure Note       Name:  Brittanie Sr   Age:  67 y.o.  :  1950                                          MRN:  [id number]         Date:  3/30/2023      Surgeon: Kimberly Glynn):  Antonio Segovia MD    Procedure: Procedure(s):  BRONCHOSCOPY    Medications prior to admission:   Prior to Admission medications    Medication Sig Start Date End Date Taking? Authorizing Provider   arformoterol tartrate (BROVANA) 15 MCG/2ML NEBU Inhale 15 mcg into the lungs 2 times daily 21   Ar Automatic Reconciliation   aspirin 81 MG chewable tablet Take 81 mg by mouth daily 21   Ar Automatic Reconciliation   budesonide (PULMICORT) 0.5 MG/2ML nebulizer suspension Inhale 500 mcg into the lungs 2 times daily 21   Ar Automatic Reconciliation   cetirizine (ZYRTEC) 10 MG tablet Take 10 mg by mouth daily    Ar Automatic Reconciliation   dilTIAZem (TIAZAC) 240 MG extended release capsule Take 240 mg by mouth daily  Patient not taking: Reported on 3/27/2023 9/9/21   Ar Automatic Reconciliation   diphenhydrAMINE-APAP, sleep, (TYLENOL PM EXTRA STRENGTH)  MG tablet Take 2 tablets by mouth as needed    Ar Automatic Reconciliation   guaiFENesin (MUCINEX) 600 MG extended release tablet Take 600 mg by mouth in the morning and 600 mg in the evening.  21   Ar Automatic Reconciliation   ipratropium-albuterol (DUONEB) 0.5-2.5 (3) MG/3ML SOLN nebulizer solution Inhale 3 mLs into the lungs every 6 hours as needed 22   Ar Automatic Reconciliation   levoFLOXacin (LEVAQUIN) 500 MG tablet Take 500 mg by mouth every 24 hours  Patient not taking: Reported on 3/27/2023 4/24/22   Ar Automatic Reconciliation   loratadine (CLARITIN) 10 MG tablet Take 10 mg by mouth daily    Ar Automatic Reconciliation   metoprolol tartrate (LOPRESSOR) 50 MG tablet Take 50 mg by mouth 2 times daily    Ar Automatic Reconciliation   predniSONE (DELTASONE) 20 MG tablet [The details of the medication are not available

## 2023-03-30 NOTE — PROGRESS NOTES
Physical Therapy    1340: Pt refusing PT at this time, waiting to go down for procedure. Will follow up as schedule permits.

## 2023-03-30 NOTE — PROGRESS NOTES
provider] enoxaparin (LOVENOX) injection 40 mg  40 mg SubCUTAneous Daily Atilio Brown MD   40 mg at 03/29/23 0823    ondansetron (ZOFRAN-ODT) disintegrating tablet 4 mg  4 mg Oral Q8H PRN Atilio Brown MD        Or    ondansetron (ZOFRAN) injection 4 mg  4 mg IntraVENous Q6H PRN Atilio Brown MD        polyethylene glycol (GLYCOLAX) packet 17 g  17 g Oral Daily PRN Atilio Brown MD        acetaminophen (TYLENOL) tablet 650 mg  650 mg Oral Q6H PRN Atilio Brown MD        Or    acetaminophen (TYLENOL) suppository 650 mg  650 mg Rectal Q6H PRN Atilio Brown MD        famotidine (PEPCID) tablet 20 mg  20 mg Oral Daily Atilio Brown MD   20 mg at 03/30/23 0814    albuterol (PROVENTIL) nebulizer solution 2.5 mg  2.5 mg Nebulization Q2H PRN Atilio Brown MD        aspirin chewable tablet 81 mg  81 mg Oral Daily Atilio Brown MD   81 mg at 03/30/23 0815    cetirizine (ZYRTEC) tablet 10 mg  10 mg Oral Daily Atilio Brown MD   10 mg at 03/30/23 0814    metoprolol tartrate (LOPRESSOR) tablet 50 mg  50 mg Oral BID Atilio Brown MD   50 mg at 03/30/23 0814    arformoterol tartrate (BROVANA) nebulizer solution 15 mcg  15 mcg Nebulization BID Roman Boland MD   15 mcg at 03/30/23 0820    budesonide (PULMICORT) nebulizer suspension 500 mcg  500 mcg Nebulization BID Roman Boland MD   500 mcg at 03/30/23 0820    doxycycline monohydrate (MONODOX) capsule 100 mg  100 mg Oral 2 times per day Inna Morgan MD   100 mg at 03/30/23 4318           Objective:    Intake/Output:     Intake/Output Summary (Last 24 hours) at 3/30/2023 1233  Last data filed at 3/30/2023 1155  Gross per 24 hour   Intake 270 ml   Output 1900 ml   Net -1630 ml         Vital Signs:    BP (!) 122/56   Pulse 55   Temp 97.4 °F (36.3 °C) (Oral)   Resp 18   Ht 5' 7\" (1.702 m)   Wt 183 lb 6.8 oz (83.2 kg)   SpO2 98%   BMI 28.73 kg/m²     Weight:  Wt Readings from Last 3 Bun/Cre Ratio 14 12 - 20      Est, Glom Filt Rate >60 >60 ml/min/1.73m2    Calcium 8.4 (L) 8.5 - 10.1 MG/DL   Protime-INR    Collection Time: 03/30/23  4:44 AM   Result Value Ref Range    Protime 14.6 11.5 - 15.2 sec    INR 1.1 0.8 - 1.2     APTT    Collection Time: 03/30/23  4:44 AM   Result Value Ref Range    PTT 25.4 23.0 - 36.4 SEC         Chemistry Recent Labs     03/28/23  0446 03/29/23  0545 03/30/23  0444   * 133* 133*   K 3.7 3.8 3.6    101 101   CO2 27 28 29   BUN 6* 9 12          Liver Enzymes Globulin   Date Value Ref Range Status   03/26/2023 3.7 2.0 - 4.0 g/dL Final     No results for input(s): TP, ALB, GLOB in the last 72 hours.     Invalid input(s): AGRAT, SGOT, GPT, AP, TBIL, DBIL       CBC w/Diff Recent Labs     03/28/23  0446 03/29/23  0545 03/30/23  0444   WBC 7.0 7.0 6.8   RBC 3.01* 3.04* 3.06*   HGB 9.0* 9.3* 9.3*   HCT 29.1* 29.5* 29.4*    174 170          Cardiac Enzymes Lab Results   Component Value Date    CKTOTAL 128 09/05/2021    CKMB 12.5 (H) 09/05/2021    CKMBINDEX 9.8 (H) 09/05/2021    TROPONINI 0.02 09/05/2021           BNP Lab Results   Component Value Date/Time     04/21/2022 11:12 AM    BNP 3,965 09/06/2021 01:00 AM    BNP 3,499 09/04/2021 03:37 AM    BNP 1,158 09/02/2021 09:33 AM        Coagulation Recent Labs     03/30/23  0444   INR 1.1   APTT 25.4             Results       Procedure Component Value Units Date/Time    Culture with Smear, Acid Fast Bacillius [[id number]]     Order Status: Sent Specimen: Sputum Induced     Culture with Smear, Acid Fast Bacillius [[id number]] Collected: 03/29/23 0545    Order Status: Sent Specimen: Sputum Induced Updated: 03/29/23 0702    Quantiferon, Incubated [[id number]] Collected: 03/27/23 1500    Order Status: Sent Specimen: Blood Updated: 03/27/23 1533    Strep Pneumoniae Antigen [[id number]] Collected: 03/27/23 0410    Order Status: Completed Specimen: Urine, random Updated: 03/27/23 0939     STREP PNEUMONIAE

## 2023-03-30 NOTE — PROGRESS NOTES
Pulmonary    Name Relation Home Work [address] Daughter-in-Law   [phone]     I called Joselyn-daughter-in-law; updated about bronchoscopy procedure; procedure went well; postprocedure, patient awake and stable respirations.     Maritza Black MD

## 2023-03-31 VITALS
RESPIRATION RATE: 18 BRPM | WEIGHT: 183.42 LBS | HEIGHT: 67 IN | DIASTOLIC BLOOD PRESSURE: 58 MMHG | TEMPERATURE: 98.6 F | SYSTOLIC BLOOD PRESSURE: 116 MMHG | BODY MASS INDEX: 28.79 KG/M2 | OXYGEN SATURATION: 96 % | HEART RATE: 72 BPM

## 2023-03-31 LAB
1,3 BETA GLUCAN SER-MCNC: <31 PG/ML
EKG ATRIAL RATE: 91 BPM
EKG DIAGNOSIS: NORMAL
EKG P AXIS: 49 DEGREES
EKG P-R INTERVAL: 110 MS
EKG Q-T INTERVAL: 424 MS
EKG QRS DURATION: 134 MS
EKG QTC CALCULATION (BAZETT): 521 MS
EKG R AXIS: 33 DEGREES
EKG T AXIS: 10 DEGREES
EKG VENTRICULAR RATE: 91 BPM
GALACTOMANNAN AG SPEC IA-ACNC: 0.04 INDEX (ref 0–0.49)

## 2023-03-31 PROCEDURE — 6370000000 HC RX 637 (ALT 250 FOR IP): Performed by: INTERNAL MEDICINE

## 2023-03-31 PROCEDURE — 2580000003 HC RX 258: Performed by: FAMILY MEDICINE

## 2023-03-31 PROCEDURE — 2700000000 HC OXYGEN THERAPY PER DAY

## 2023-03-31 PROCEDURE — 6370000000 HC RX 637 (ALT 250 FOR IP): Performed by: FAMILY MEDICINE

## 2023-03-31 PROCEDURE — 93010 ELECTROCARDIOGRAM REPORT: CPT | Performed by: INTERNAL MEDICINE

## 2023-03-31 PROCEDURE — 6360000002 HC RX W HCPCS: Performed by: INTERNAL MEDICINE

## 2023-03-31 PROCEDURE — 94640 AIRWAY INHALATION TREATMENT: CPT

## 2023-03-31 RX ADMIN — SODIUM CHLORIDE, PRESERVATIVE FREE 10 ML: 5 INJECTION INTRAVENOUS at 09:48

## 2023-03-31 RX ADMIN — ARFORMOTEROL TARTRATE 15 MCG: 15 SOLUTION RESPIRATORY (INHALATION) at 07:28

## 2023-03-31 RX ADMIN — ASPIRIN 81 MG: 81 TABLET, CHEWABLE ORAL at 09:47

## 2023-03-31 RX ADMIN — BUDESONIDE 500 MCG: 0.5 INHALANT RESPIRATORY (INHALATION) at 07:27

## 2023-03-31 RX ADMIN — CETIRIZINE HYDROCHLORIDE 10 MG: 10 TABLET, FILM COATED ORAL at 09:48

## 2023-03-31 RX ADMIN — IPRATROPIUM BROMIDE AND ALBUTEROL SULFATE 1 AMPULE: .5; 3 SOLUTION RESPIRATORY (INHALATION) at 12:59

## 2023-03-31 RX ADMIN — IPRATROPIUM BROMIDE AND ALBUTEROL SULFATE 1 AMPULE: .5; 3 SOLUTION RESPIRATORY (INHALATION) at 07:27

## 2023-03-31 RX ADMIN — DOXYCYCLINE 100 MG: 100 CAPSULE ORAL at 06:34

## 2023-03-31 RX ADMIN — METOPROLOL TARTRATE 50 MG: 50 TABLET ORAL at 09:48

## 2023-03-31 RX ADMIN — IPRATROPIUM BROMIDE AND ALBUTEROL SULFATE 1 AMPULE: .5; 3 SOLUTION RESPIRATORY (INHALATION) at 16:08

## 2023-03-31 RX ADMIN — FAMOTIDINE 20 MG: 20 TABLET, FILM COATED ORAL at 09:48

## 2023-03-31 NOTE — PROGRESS NOTES
Physician Progress Note      PATIENT:               Piero Padilla  CSN #:                  [id number]  :                       1950  ADMIT DATE:       3/26/2023 7:56 PM  [address] [place] DATE:  Erin Curry  PROVIDER #:        Meli David MD          QUERY TEXT:    Patient admitted with COPD exacerbation. Noted documentation of acute   respiratory failure in progress note active problem list . In order to   support the diagnosis of acute respiratory failure, please include additional   clinical indicators in your documentation. Or please document if the   diagnosis of acute respiratory failure has been ruled out after further study. The medical record reflects the following:  Risk Factors: PMH: COPD; previous respiratory failure with hypoxia  Clinical Indicators:   ER:   She is not in acute distress. Tachypnea,   wheezes heard bilaterally rhonchi right lower lung fields  3/27  Hospitalist note:   Acute hypoxemic respiratory failure ([address] [address]   [J96.01] 2023  RR 16-28  ABG RA pH 7.53; PO2 62; PCO2 31.2  Treatment: O2 2L; ABG    Acute Respiratory Failure Clinical Indicators per  MS-DRG Training Guide and   Quick Reference Guide:  pO2 < 60 mmHg or SpO2 (pulse oximetry) < 91% breathing room air  pCO2 > 50 and pH < 7.35  P/F ratio (pO2 / FIO2) < 300  pO2 decrease or pCO2 increase by 10 mmHg from baseline (if known)  Supplemental oxygen of 40% or more  Presence of respiratory distress, tachypnea, dyspnea, shortness of breath,   wheezing  Unable to speak in complete sentences  Use of accessory muscles to breathe  Extreme anxiety and feeling of impending doom  Tripod position  Confusion/altered mental status/obtunded    Thank you,  Arnold Grimm [email or web address]  Options provided:  -- Acute Respiratory Failure as evidenced by, Please document evidence.   -- Acute Respiratory Failure ruled out after study  -- Other - I will add my own diagnosis  -- Disagree - Not applicable / Not

## 2023-03-31 NOTE — CARE COORDINATION
D/c plan: Home w/ [address] and O2 tank provided by Emily Martinez. Family to transport. CM spoke to pt. Informed pt that CM will bring a portable O2 tank upstairs to her bedside RN for her to transport home w/. Informed pt she will need to call the oncall  w/ Emily Martinez when she gets home to have the concentrator delivered tonight. Pt verbalized an understanding. Informed pt that [facility] will call her within 24-48 hours. Pt verbalized an understanding. Anticipate d/c home today w/ family.

## 2023-03-31 NOTE — DISCHARGE SUMMARY
[address]    Name:  Pepito Cassidy  MR#:   [id number]  :  1950  ACCOUNT #:  [de-identified]  ADMIT DATE:  2023  DISCHARGE DATE:      DISCHARGE DIAGNOSES:  1. Hypoxia. 2.  Bronchiectasis. 3.  Chronic obstructive pulmonary disease. 4.  Abnormal CT scan of the chest.  5.  History of positive PPD. HOSPITAL SUMMARY:  This is a 75-year-old female who has COPD and bronchiectasis and previous respiratory failure with hypoxia as well as chronic urinary retention, who came in for shortness of breath and low oxygen levels. She sees Dr. Nick Alejandro at [address] [place] [address]. She has had consultants to include Pulmonology and Infectious Disease during her stay here. The concerns on the CT chest, abdomen and pelvis were that there were abnormal areas with suspicious for possible mycobacterial infection. These were multifocal areas of nodular airspace disease with central lobular nodularities, and she initially had electrolyte abnormalities TO include low potassium, sodium and magnesium as well that required correction. The patient was placed on respiratory treatments and steroids. She was followed by Pulmonology and Infectious Disease, and the patient does have a history of smoking. She quit 2 years ago. The concern was for possible mycobacterial infection for her and so QuantiFERON Gold, Fungitell, and Cryptococcal antigen were sent. The patient had an urine culture that was negative. She was able to discontinue ceftriaxone but continued for a course of doxycycline which she has completed today. Pulmonology has been following along as well and ultimately, they recommended proceeding with a bronchoscopy if the patient and family agreed. So that further testing could be procured as she was unable to really effectively cough up sputum here. So she agreed and underwent the bronchoscopy on . She tolerated this very well.   She has had no issues that was negative. Her last chest x-ray yesterday showed very mild bibasilar atelectasis and she is having no respiratory distress today. She will discharge home with Home Health, followup is as noted. MEDICATIONS:  1.  Brovana twice daily. 2.  Aspirin 81 mg daily. 3.  Pulmicort twice daily. 4.  Zyrtec 10 mg daily. 5.  Mucinex 600 mg twice daily. 6.  DuoNeb every 4-6 hours as needed. 7.  Claritin 10 mg daily. 8.  Lopressor 50 mg twice daily. 9.  She has finished her antibiotic treatment at this time. Followup is as noted. Regular diet on discharge. She has a do not resuscitate code status. Thirty six minutes discharge time.       Ivania Benavides MD      [id number]  D:  03/31/2023 14:03  T:  03/31/2023 14:31  JOB #:  [id number]  CC:  Cristina Trujillo MD

## 2023-03-31 NOTE — PROGRESS NOTES
Chart reviewed. Sputum AFB from BAL still pending. No fever. On 2 LPM O2 NC. D/w ID Dr. Camille Gaviria; recommended to discharge patient and follow-up with Dr. Juan Burton in 1 week to follow the blood culture results. Pulmonary will sign off  Call us with any questions.     Holly Rouse MD 3/31/2023 11:37 AM

## 2023-03-31 NOTE — PROGRESS NOTES
Brief ID Note    I'm bearded. Chart reviewed - no AFB smears done yet this admission  FOB/BAL done yesterday - those smears pending. 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 I'll be back on MON for lab reviews.     Gisel Oliver MD  Cell [phone]  Ramón Burton7 [facility]

## 2023-03-31 NOTE — PROGRESS NOTES
conducted an initial consultation and Spiritual Assessment for [facility], who is a 67 y.o.,female. Patients Primary Language is: [place]. According to the patients EMR Pentec[employer]al Affiliation is: Donna Duncan.     The reason the Patient came to the hospital is:   Patient Active Problem List    Diagnosis Date Noted    Hypoxia 03/27/2023    Acute hypoxemic respiratory failure ([address] 03/27/2023    Bronchiectasis with acute exacerbation ([address] 03/27/2023    Anemia 03/27/2023    Chronic retention of urine 03/27/2023    Self-catheterizes urinary bladder 03/27/2023    Pulmonary infiltrates 03/27/2023    COPD exacerbation ([facility] [address]     Hypertension         The  provided the following Interventions:  Initiated a relationship of care and support. Listened empathically. Provided information about Spiritual Care Services. Offered assurance of prayer on patient's behalf.  provided devotional material for patient. Chart reviewed. Assessment:  Patient does not have any Religion/cultural needs that will affect patients preferences in health care. Plan:  Chaplains will continue to follow and will provide pastoral care on an as needed/requested basis.  recommends bedside caregivers page  on duty if patient shows signs of acute spiritual or emotional distress. Rev.  Levy Horn, Certified Respecting [facility] [place][note 2]1 [facility] [place] Consultant  Formerly [facility]  [phone]

## 2023-03-31 NOTE — PLAN OF CARE
DC info given to patient, she stated understanding  Problem: Discharge Planning  Goal: Discharge to home or other facility with appropriate resources  Outcome: Adequate for Discharge     Problem: Pain  Goal: Verbalizes/displays adequate comfort level or baseline comfort level  Outcome: Adequate for Discharge     Problem: Safety - Adult  Goal: Free from fall injury  Outcome: Adequate for Discharge     Problem: ABCDS Injury Assessment  Goal: Absence of physical injury  Outcome: Adequate for Discharge     Problem: Respiratory - Adult  Goal: Achieves optimal ventilation and oxygenation  Outcome: Adequate for Discharge  Flowsheets (Taken 3/31/2023 0800)  Achieves optimal ventilation and oxygenation: Assess for changes in respiratory status

## 2023-03-31 NOTE — PROGRESS NOTES
RT Evaluation for Home Oxygen    Resting (Room Air):  SpO2:  94  HR:  72    Resting (On O2):  SpO2:  96   HR: 65   O2 Device:  2 LNC  O2 Flow Rate (L/min):    FIO2 (%):    Comments:      During Walk (Room Air):  SpO2:  86  HR:  80  Walk/Assistance Device:  WALKER  Rate of Dyspnea:  MILD TO MODERATE  Symptoms:  NONE  Comments:  N/A    During Walk (On O2):  SpO2:  97  HR:  78  O2 Device:  2 LNC  O2 Flow Rate (L/min):    O2 Flow Rate:    O2 Saturation:    O2 Flow Rate:    O2 Saturation:    O2 Flow Rate:    O2 Saturation:    O2 Flow Rate:    O2 Saturation:    O2 Flow Rate:    O2 Saturation:    Walk/Assistance Device:  WALKER  Rate of Dyspnea:  NONE  Symptoms:  NONE  Comments:  N/A    After Walk:  SpO2: 93   HR:  67  O2 Device:  2 LNC  O2 Flow Rate (L/min):    FIO2 (%):    Rate of Dyspnea:  NONE  Symptoms:  NONE  Comments:    Does the Patient Qualify for Home O2:    Liter Flow at Rest:    Liter Flow on Exertion:    Does the Patient Need Portable Oxygen Tanks:         Additional Comments:     Electronically signed by Eric Sanchez RN on 3/31/2023 at 2:11 PM

## 2023-04-01 ENCOUNTER — HOME HEALTH ADMISSION (OUTPATIENT)
Age: 73
End: 2023-04-01
Payer: MEDICARE

## 2023-04-01 LAB
ACID FAST STN SPEC: NEGATIVE
ACID FAST STN SPEC: NEGATIVE
BACTERIA SPEC CULT: NORMAL
BACTERIA SPEC CULT: NORMAL
MYCOBACTERIUM SPEC QL CULT: NORMAL
MYCOBACTERIUM SPEC QL CULT: NORMAL
SERVICE CMNT-IMP: NORMAL
SERVICE CMNT-IMP: NORMAL
SPECIMEN PREPARATION: NORMAL
SPECIMEN PREPARATION: NORMAL
SPECIMEN SOURCE: NORMAL
SPECIMEN SOURCE: NORMAL

## 2023-04-02 LAB
BACTERIA SPEC CULT: ABNORMAL
GRAM STN SPEC: ABNORMAL
SERVICE CMNT-IMP: ABNORMAL
SERVICE CMNT-IMP: ABNORMAL

## 2023-04-03 ENCOUNTER — HOME CARE VISIT (OUTPATIENT)
Age: 73
End: 2023-04-03
Payer: MEDICARE

## 2023-04-03 ENCOUNTER — HOME CARE VISIT (OUTPATIENT)
Age: 73
End: 2023-04-03

## 2023-04-03 VITALS
DIASTOLIC BLOOD PRESSURE: 70 MMHG | TEMPERATURE: 97.2 F | HEART RATE: 66 BPM | RESPIRATION RATE: 18 BRPM | SYSTOLIC BLOOD PRESSURE: 130 MMHG | OXYGEN SATURATION: 96 %

## 2023-04-03 PROCEDURE — G0299 HHS/HOSPICE OF RN EA 15 MIN: HCPCS

## 2023-04-03 ASSESSMENT — ENCOUNTER SYMPTOMS
DYSPNEA ACTIVITY LEVEL: AT REST
PAIN LOCATION - PAIN QUALITY: INTERMITTENT

## 2023-04-03 NOTE — CASE COMMUNICATION
SN admit on 4/3/23 for COPD, 1w4, 2 PRN, patient is on oxygen. No major or severe drug interactions noted.

## 2023-04-03 NOTE — HOME HEALTH
s/s of infection, deep breathing exercises. .x.... Breathing Exercises (HEP):): Reviewed HEP in admission packet - patient performed return demonstration of deep breathing exercise. Patient to perform 5 deep breathing exercises every 2 hours, while awake. Woodrow Rust.. Marie Rod Diet/Nutrition: Importance of a healthy, protein diet, to promote healing and wellness, proper hydration  . Woodrow Rust... Infection Control:   Proper hand washing with soap and water, after each trip to the bathroom and after eating meals  . Woodrow Rust... Other:  safety and fall precautions, importance of repositioning self to reduce pressure points    Patient level of understanding of education provided: Pt verbalized understanding of all education and repeated back teaching     Sharps Education Provided: NA  Patient response to procedure performed:  NA    Home exercise program/Homework provided: PT and OT    Pt/Caregiver instructed on plan of care and are agreeable to plan of care at this time. Physician Vee Baird MD and Simin Soto MD notified of patient admission to home health and plan of care including anticipated frequency of SN and treatments/interventions/modalities of SN. Discharge planning discussed with patient and caregiver. Discharge planning as follows: SN discharge when teaching and goals are met. Marie Rod Pt/Caregiver did verbalize understanding of discharge planning. Next MD appointment TBD (date) with Dr Analilia Leavitt MD.  Myriam Arcos encouraged/instructed to keep appointment as lack of follow through with physician appointment could result in discontinuation of home care services for non-compliance.

## 2023-04-04 LAB
GALACTOMANNAN AG SERPL IA-ACNC: 0.06
GALACTOMANNAN AG SERPL IA-ACNC: 0.07

## 2023-04-05 ENCOUNTER — HOME CARE VISIT (OUTPATIENT)
Age: 73
End: 2023-04-05
Payer: MEDICARE

## 2023-04-05 VITALS
TEMPERATURE: 97.1 F | RESPIRATION RATE: 18 BRPM | SYSTOLIC BLOOD PRESSURE: 145 MMHG | HEART RATE: 70 BPM | DIASTOLIC BLOOD PRESSURE: 77 MMHG | OXYGEN SATURATION: 100 %

## 2023-04-05 PROCEDURE — G0299 HHS/HOSPICE OF RN EA 15 MIN: HCPCS

## 2023-04-05 ASSESSMENT — ENCOUNTER SYMPTOMS
DYSPNEA ACTIVITY LEVEL: AFTER AMBULATING LESS THAN 10 FT
PAIN LOCATION - PAIN QUALITY: INTERMITTENT

## 2023-04-05 NOTE — HOME HEALTH
Skilled reason for visit: Patient was recently hospitalized for COPD, requiring observation by a SN for s/s of decomposition or adverse effects resulting from newly prescribed medications. Skilled observation needed to determine if new medication regimen prescribed requires modifications or other therapeutic interventions considered until pt's clinical condition or treatment has stabilized. Caregiver involvement:  Patient's caregiver is her family. Caregiver assists patient with bathing, dressing, walking, bathroom, meal prep and setup, medication management, grocery shopping, household chores, transportation to MD appointment and home exercise program.  Medications reconciled and all medications are available in the home this visit. The following education was provided regarding medications, medication interactions, and look alike modifications. tamsulosin (FLOMAX) 0.4 MG capsule         Contact Agency or MD with questions. Medications are effective at this time. Patient states understanding. Patient education provided this visit:  . DARIELA Duckworth Disease Management: COPD, Kunz removal teaching, pain management  Patient level of understanding of education provided: Pt verbalized understanding of all education and repeated back teaching   Skilled Care Performed this visit: Disease process teaching, medication teaching, physical assessment and monitoring  Patient response to procedure performed:  PRANAV  Sharps Education Provided: PRANAV  Goals/teaching progressing. Patient's goal is to regain her strength. Progressing toward goals. Patient has remained free from falls, free from infection; no safety concerns at this time and is ambulating independently with walker.   SN to complete education of patient and patient to follow up with any further questions or concerns with Dr Bryce Gordon exercise program: PT  Continued need for the following skills: Nursing, PT   Patient and/or caregiver notified and agree to changes in the

## 2023-04-06 ENCOUNTER — HOME CARE VISIT (OUTPATIENT)
Age: 73
End: 2023-04-06
Payer: MEDICARE

## 2023-04-13 LAB
Lab: NORMAL
REFERENCE LAB: NORMAL
REFERENCE LAB: NORMAL

## 2023-04-17 ENCOUNTER — HOME CARE VISIT (OUTPATIENT)
Age: 73
End: 2023-04-17
Payer: MEDICARE

## 2023-04-17 PROCEDURE — G0495 RN CARE TRAIN/EDU IN HH: HCPCS

## 2023-04-18 VITALS
HEART RATE: 60 BPM | DIASTOLIC BLOOD PRESSURE: 64 MMHG | TEMPERATURE: 97.1 F | SYSTOLIC BLOOD PRESSURE: 99 MMHG | OXYGEN SATURATION: 97 % | RESPIRATION RATE: 18 BRPM

## 2023-04-18 ASSESSMENT — ENCOUNTER SYMPTOMS: DYSPNEA ACTIVITY LEVEL: AT REST

## 2023-04-18 NOTE — HOME HEALTH
Skilled reason for visit: Patient was recently hospitalized for  COPD, requiring observation by a SN for s/s of decomposition or adverse effects resulting from newly prescribed medications. Skilled observation needed to determine if new medication regimen prescribed requires modifications or other therapeutic interventions considered until pt's clinical condition or treatment has stabilized. Caregiver involvement:  Patient's caregiver is her family. Caregiver assists patient with bathing, dressing, walking, bathroom, meal prep and setup, medication management, grocery shopping, household chores, transportation to MD appointment and home exercise program.  Medications reconciled and all medications are available in the home this visit. The following education was provided regarding medications, medication interactions, and look alike modifications. tamsulosin (FLOMAX) 0.4 MG capsule        Contact Agency or MD with questions. Medications are effective at this time. Patient states understanding. Patient education provided this visit:  Carolina Sanchez Disease Management: COPD teaching, s/s of infection, deep breathing exercises  Patient level of understanding of education provided: Pt verbalized understanding of all education and repeated back teaching   Skilled Care Performed this visit: Disease process teaching, medication teaching, physical assessment and monitoring  Patient response to procedure performed:  PRANAV  Sharps Education Provided: NA  Goals/teaching progressing. Patient's goal is to regain her strength. Progressing toward goals. Patient has remained free from falls, free from infection; no safety concerns at this time and is ambulating independently with walker.   SN to complete education of patient and patient to follow up with any further questions or concerns with Dr Hector Whitley exercise program: PT  Continued need for the following skills: Nursing, PT   Patient and/or caregiver notified and agree to

## 2023-04-20 ENCOUNTER — HOME CARE VISIT (OUTPATIENT)
Age: 73
End: 2023-04-20
Payer: MEDICARE

## 2023-04-20 VITALS
RESPIRATION RATE: 14 BRPM | SYSTOLIC BLOOD PRESSURE: 143 MMHG | DIASTOLIC BLOOD PRESSURE: 75 MMHG | OXYGEN SATURATION: 98 % | HEART RATE: 70 BPM | TEMPERATURE: 98.1 F

## 2023-04-20 PROCEDURE — G0157 HHC PT ASSISTANT EA 15: HCPCS

## 2023-04-20 PROCEDURE — G0152 HHCP-SERV OF OT,EA 15 MIN: HCPCS

## 2023-04-20 NOTE — HOME HEALTH
SUBJECTIVE: I want to be able to walk more without getting winded  CAREGIVER INVOLVEMENT/ASSISTANCE NEEDED FOR: Lives with daughter who can assist with IADLs and driving  . OBJECTIVE:  See interventions. PATIENT EDUCATION PROVIDED THIS VISIT: Complete COPD seated exercises and walk every 2 hours within your home. PATIENT RESPONSE TO EDUCATION PROVIDED: verbalized understanding  PATIENT RESPONSE TO TREATMENT: Increase in SOB with gait training. O2 remains steady at 98% throughout on 2ltrs NC oxygen. .  ASSESSMENT OF PROGRESS TOWARD GOALS: Patient is progressing towards goals as previously set in plan of care with skilled home health physical therapy services at this time made apparent by improving ambulation and therapeutic exercises completion. Moderate fatigue noted during treatment with seated rest breaks needed throughout. Kuldeep David PLAN FOR NEXT VISIT: Increase strengthening exercises to include COPD standing ones and increase walking distance  THE FOLLOWING DISCHARGE PLANNING WAS DISCUSSED WITH THE PATIENT/CAREGIVER: At this time needs continued skilled home health physical therapy to address deficits, reduce fall risk and to obtain goals previously established per plan of care. 1x1 2x3 visit are left.

## 2023-04-21 ENCOUNTER — HOME CARE VISIT (OUTPATIENT)
Age: 73
End: 2023-04-21
Payer: MEDICARE

## 2023-04-21 VITALS
SYSTOLIC BLOOD PRESSURE: 149 MMHG | TEMPERATURE: 97.9 F | DIASTOLIC BLOOD PRESSURE: 78 MMHG | HEART RATE: 66 BPM | RESPIRATION RATE: 16 BRPM | OXYGEN SATURATION: 95 %

## 2023-04-21 VITALS
OXYGEN SATURATION: 94 % | HEART RATE: 62 BPM | TEMPERATURE: 97.8 F | DIASTOLIC BLOOD PRESSURE: 84 MMHG | SYSTOLIC BLOOD PRESSURE: 130 MMHG | RESPIRATION RATE: 15 BRPM

## 2023-04-21 PROCEDURE — G0157 HHC PT ASSISTANT EA 15: HCPCS

## 2023-04-21 NOTE — HOME HEALTH
Summary of clinical condition:   HOSPITAL SUMMARY:  This is a 79-year-old female who has COPD and bronchiectasis and previous respiratory failure with hypoxia as well as chronic urinary retention, who came in for shortness of breath and low oxygen levels. She sees Dr. Rogelio German at [address] [place] [address]. She has had consultants to include Pulmonology and Infectious Disease during her stay here. The concerns on the CT chest, abdomen and pel vis were that there were abnormal areas with suspicious for possible mycobacterial infection. These were multifocal areas of nodular airspace disease with central lobular nodularities, and she initially had electrolyte abnormalities TO include low potassium, sodium and magnesium as well that required correction. The patient was placed on respiratory treatments and steroids. She was followed by Pulmonology and Infectious Disease, and the patient does have a history of smoking. She quit 2 years ago. The concern was for possible mycobacterial infection for her and so QuantiFERON Gold, Fungitell, and Cryptococcal antigen were sent. The patient had an urine culture that was negative. She was able to discontinue ceftriaxone but continued for a course of doxycycline which she has completed today. Pulmonology has been following along as well and  ultimately, they recommended proceeding with a bronchoscopy if the patient and family agreed. So that further testing could be procured as she was unable to really effectively cough up sputum here. So she agreed and underwent the bronchoscopy on 03/30. She tolerated this very well. She has had no issues since. Pt was discharged home with orders for [facility] OT    Medical History:  COPD, HTN, Arthrosclerosis, Angioplasty, Right ORIF. Medications Review Completed: No adverse reactions noted. Pt denied any changes since last SN visit.   Caregiver involvement:  Pt lives in 2 story house with her son, daughter-in-law,

## 2023-04-21 NOTE — HOME HEALTH
SUBJECTIVE: One of my goals is to sit outside in the sun  CAREGIVER INVOLVEMENT/ASSISTANCE NEEDED FOR: Lives with son and daughter in law. Daughter is home with her. She can provide assistance with IADLs and son can drive. .  OBJECTIVE:  See interventions. PATIENT EDUCATION PROVIDED THIS VISIT: Complete COPD HEP with 15 reps not 30 to build stamina, walk 3x a day within home and sit outside. PATIENT RESPONSE TO EDUCATION PROVIDED: verbalized understanding  PATIENT RESPONSE TO TREATMENT: no increase in pain, or SOB with gait and curb training   . ASSESSMENT OF PROGRESS TOWARD GOALS: Patient is progressing towards goals as previously set in plan of care with skilled home health physical therapy services at this time made apparent by improving ambulation and curb management and motivation to continue with COPD HEP. Moderate fatigue noted during treatment with seated rest breaks needed throughout. PLAN FOR NEXT VISIT: Car transfer, increase HEP reps and gait distance  THE FOLLOWING DISCHARGE PLANNING WAS DISCUSSED WITH THE PATIENT/CAREGIVER:  At this time needs continued skilled home health physical therapy to address deficits, reduce fall risk and to obtain goals previously established per plan of care. 2x3 visit are left.

## 2023-04-25 ENCOUNTER — HOME CARE VISIT (OUTPATIENT)
Age: 73
End: 2023-04-25
Payer: MEDICARE

## 2023-04-25 VITALS
DIASTOLIC BLOOD PRESSURE: 68 MMHG | RESPIRATION RATE: 16 BRPM | SYSTOLIC BLOOD PRESSURE: 116 MMHG | OXYGEN SATURATION: 95 % | HEART RATE: 60 BPM | TEMPERATURE: 97.2 F

## 2023-04-25 PROCEDURE — G0299 HHS/HOSPICE OF RN EA 15 MIN: HCPCS

## 2023-04-25 ASSESSMENT — ENCOUNTER SYMPTOMS
PAIN LOCATION - PAIN QUALITY: CONSTANT
DYSPNEA ACTIVITY LEVEL: AFTER AMBULATING LESS THAN 10 FT

## 2023-04-26 ENCOUNTER — HOME CARE VISIT (OUTPATIENT)
Age: 73
End: 2023-04-26
Payer: MEDICARE

## 2023-04-26 VITALS
OXYGEN SATURATION: 92 % | SYSTOLIC BLOOD PRESSURE: 154 MMHG | RESPIRATION RATE: 14 BRPM | DIASTOLIC BLOOD PRESSURE: 79 MMHG | HEART RATE: 63 BPM | TEMPERATURE: 98.1 F

## 2023-04-26 PROCEDURE — G0157 HHC PT ASSISTANT EA 15: HCPCS

## 2023-04-26 NOTE — HOME HEALTH
SUBJECTIVE: I don't do much of the exercises but I try  CAREGIVER INVOLVEMENT/ASSISTANCE NEEDED FOR: Lives with son and family, who can assist with IADLs and driving  . OBJECTIVE:  See interventions. PATIENT EDUCATION PROVIDED THIS VISIT: Encouraged to increase use of HEP for strengthening and stamina training  PATIENT RESPONSE TO EDUCATION PROVIDED: verbalized understanding  PATIENT RESPONSE TO TREATMENT: No pain with gait training or B LE exercises  . ASSESSMENT OF PROGRESS TOWARD GOALS: Patient is progressing towards goals as previously set in plan of care with skilled home health physical therapy services at this time made apparent by improving ambulation and therapeutic exercises completion. Moderate fatigue noted during treatment with seated rest breaks needed throughout. PLAN FOR NEXT VISIT: Complete curb training to reach car and increase gait distance  THE FOLLOWING DISCHARGE PLANNING WAS DISCUSSED WITH THE PATIENT/CAREGIVER:  At this time needs continued skilled home health physical therapy to address deficits, reduce fall risk and to obtain goals previously established per plan of care. 1x1 2x2 visit are left.

## 2023-04-28 ENCOUNTER — HOME CARE VISIT (OUTPATIENT)
Age: 73
End: 2023-04-28
Payer: MEDICARE

## 2023-04-28 VITALS
OXYGEN SATURATION: 95 % | RESPIRATION RATE: 15 BRPM | HEART RATE: 65 BPM | SYSTOLIC BLOOD PRESSURE: 116 MMHG | DIASTOLIC BLOOD PRESSURE: 70 MMHG | TEMPERATURE: 97.3 F

## 2023-04-28 PROCEDURE — G0157 HHC PT ASSISTANT EA 15: HCPCS

## 2023-04-28 NOTE — HOME HEALTH
SUBJECTIVE: I feel I am getting stronger  CAREGIVER INVOLVEMENT/ASSISTANCE NEEDED FOR: Lives with family who can assist with IADLs and driving  . OBJECTIVE:  See interventions. PATIENT EDUCATION PROVIDED THIS VISIT: Continue with HEP walk within home to include curb atleast once a day  PATIENT RESPONSE TO EDUCATION PROVIDED: verbalized understanding  PATIENT RESPONSE TO TREATMENT: no increase in pain or drop in oxygen levels with increase in gait distance and attempts  . ASSESSMENT OF PROGRESS TOWARD GOALS: Patient is progressing towards goals as previously set in plan of care with skilled home health physical therapy services at this time made apparent by improving ambulation distance and curb navigation completion. Moderate fatigue noted during treatment with seated rest breaks needed throughout. Jennie Lemons PLAN FOR NEXT VISIT: Outside gait training and curb to reach garage for car transfer  [address] PATIENT/CAREGIVER:  At this time needs continued skilled home health physical therapy to address deficits, reduce fall risk and to obtain goals previously established per plan of care. 2x2 visit are left.

## 2023-05-01 ENCOUNTER — HOME CARE VISIT (OUTPATIENT)
Age: 73
End: 2023-05-01
Payer: MEDICARE

## 2023-05-01 VITALS
RESPIRATION RATE: 15 BRPM | OXYGEN SATURATION: 94 % | SYSTOLIC BLOOD PRESSURE: 112 MMHG | HEART RATE: 55 BPM | TEMPERATURE: 97.7 F | DIASTOLIC BLOOD PRESSURE: 62 MMHG

## 2023-05-01 PROCEDURE — G0157 HHC PT ASSISTANT EA 15: HCPCS

## 2023-05-01 NOTE — HOME HEALTH
SUBJECTIVE: I am walking more each day. CAREGIVER INVOLVEMENT/ASSISTANCE NEEDED FOR: Lives with son and his family, they can assist with IADLs and driving  . OBJECTIVE:  See interventions. PATIENT EDUCATION PROVIDED THIS VISIT: Continue with B LE HEP and walking throughout the day  PATIENT RESPONSE TO EDUCATION PROVIDED: verbalized understanding  PATIENT RESPONSE TO TREATMENT: No pain with gait or curb training  . ASSESSMENT OF PROGRESS TOWARD GOALS: Patient is progressing towards goals as previously set in plan of care with skilled home health physical therapy services at this time made apparent by improving ambulation and curb completion. Moderate fatigue noted during treatment with seated rest breaks needed throughout. Continues in 2 ltrs of NC O2    PLAN FOR NEXT VISIT: Standing balance exercises  THE FOLLOWING DISCHARGE PLANNING WAS DISCUSSED WITH THE PATIENT/CAREGIVER: Discussed with patient eventual discharge once patient has met goals and/or maximum benefit from home health skilled Physical Therapy services, patient understands and agreeable to goals. At this time needs continued skilled home health physical therapy to address deficits, reduce fall risk and to obtain goals previously established per plan of care. 1x1  2x1 visit are left.

## 2023-05-04 ENCOUNTER — HOME CARE VISIT (OUTPATIENT)
Age: 73
End: 2023-05-04
Payer: MEDICARE

## 2023-05-08 ENCOUNTER — HOME CARE VISIT (OUTPATIENT)
Age: 73
End: 2023-05-08
Payer: MEDICARE

## 2023-05-08 VITALS
TEMPERATURE: 97.9 F | HEART RATE: 66 BPM | OXYGEN SATURATION: 93 % | SYSTOLIC BLOOD PRESSURE: 177 MMHG | RESPIRATION RATE: 15 BRPM | DIASTOLIC BLOOD PRESSURE: 75 MMHG

## 2023-05-08 PROCEDURE — G0157 HHC PT ASSISTANT EA 15: HCPCS

## 2023-05-08 NOTE — HOME HEALTH
SUBJECTIVE: I feel so much stronger  CAREGIVER INVOLVEMENT/ASSISTANCE NEEDED FOR: Lives with maliha and his wife, they assist with IADLs and transport  . OBJECTIVE:  See interventions. PATIENT EDUCATION PROVIDED THIS VISIT: Educated to continue with B LE HEP and walk 3 x a day with FWW  PATIENT RESPONSE TO EDUCATION PROVIDED: Verbalized understanding  PATIENT RESPONSE TO TREATMENT: No pain with gait training, or the curb navigation   . Assessment and Summary of Care:  Patient's current functional status before discharge is as follows  Bed Mobility: Independent  Transfers: Mod Independent with all transfers uses FWW for balance with pivots  Gait/WC mobility: Mod Independent with FWW to complete 150 ft   Stairs: S with curb FWW for balance and stability  Special Tests: 22/28 Tinetti score, needs FWW for balance with walking and turning and hands to push up and reach back for sit<>stands  Recommendations: Home with family with HEP, under the medical care of her PCP    ASSESSMENT OF PROGRESS TOWARD GOALS: Patient has met goals as previously set in plan of care with skilled home health physical therapy services at this time made apparent by improving ambulation and curb completion. Moderate fatigue noted during treatment with seated rest breaks needed throughout. Aris Chairez   PLAN FOR NEXT VISIT: [facility] PT discharge  THE FOLLOWING DISCHARGE PLANNING WAS DISCUSSED WITH THE PATIENT/CAREGIVER: Home with family with HEP and under the medical care of PCP

## 2023-05-09 ENCOUNTER — HOME CARE VISIT (OUTPATIENT)
Age: 73
End: 2023-05-09
Payer: MEDICARE

## 2023-05-10 ENCOUNTER — HOME CARE VISIT (OUTPATIENT)
Age: 73
End: 2023-05-10
Payer: MEDICARE

## 2023-05-10 VITALS
DIASTOLIC BLOOD PRESSURE: 60 MMHG | TEMPERATURE: 97.7 F | HEART RATE: 60 BPM | RESPIRATION RATE: 14 BRPM | OXYGEN SATURATION: 96 % | SYSTOLIC BLOOD PRESSURE: 108 MMHG

## 2023-05-10 PROCEDURE — G0151 HHCP-SERV OF PT,EA 15 MIN: HCPCS

## 2023-05-10 ASSESSMENT — ENCOUNTER SYMPTOMS: DYSPNEA ACTIVITY LEVEL: AFTER AMBULATING 10 - 20 FT

## 2023-05-11 NOTE — HOME HEALTH
S: The patient stated she was doing very well and ready for PT discharge this visit. She states the nurse has had her last visit with her already. O: PAIN: see pain tab   WOUND:no open wounds noted or reported. ROM: no impairments noted   STRENGTH: WFL BLE, FTSTS 33 seconds   BED MOBILITY: independent   EQUIPMENT: FWW, O2 on 2L continuous, WC, shower chair  TRANSFERS: MI  GAIT:MI with FWW and patient able to indpendently manage O2 tubing safely  STEPS: supervision to exit home    BALANCE: Jose R 22/28 and patient has been free from falls   A:ASSESSMENT AND PROGRESS TOWARD GOALS:  Vangie Blair received skilled PT and RN s/p hospitalization for COPD. This patient has currently met maximum potential with home PT and has been discharged to a [facility] under the care and supervision of her family and PCP at this time. The following goals have been met: Jose R 22/28, pt is ambulating 150 ft MI with a FWW and transferring MI. She has been free from falls and her O2 has been >95% on 2L of O2 with therapy sessions. Patient verbalized understanding of all discharge instructions and is in agreement with discharge this visit. P:DC    . Discharge medication list reconciled with patient and caregiver. Questions regarding medications answered and patient/caregiver advised to refer to MD for any medication questions after discharge. 2. Patient to continue use of the following assistive device for maximum safety: FWW and O2  3. Today's treatment included: review of therapeutic exercise program, reassessment of mobility, transfers, balance and gait. 4. Patient and caregiver demonstrate understanding of DC instructions and repeat verbalization. Patient and caregiver given written copy of instructions. 5. Patient and caregiver given notification of discharge and in agreement with DC this date. 6. MD notified of discharge.

## 2023-05-11 NOTE — CASE COMMUNICATION
Ralf Epstein received skilled PT and RN s/p hospitalization for COPD. This patient has currently met maximum potential with home PT and has been discharged to a HEP under the care and supervision of her family and PCP at this time. The following goals have been met: Jose R 22/28, pt is ambulating 150 ft MI with a FWW and transferring MI. She has been free from falls and her O2 has been >95% on 2L of O2 with therapy sessions. Patient  verbalized understanding of all discharge instructions and is in agreement with discharge this visit.

## 2023-06-06 LAB
ACID FAST STN SPEC: NEGATIVE
ACID FAST STN SPEC: NEGATIVE
MYCOBACTERIUM SPEC QL CULT: NEGATIVE
MYCOBACTERIUM SPEC QL CULT: NEGATIVE
SPECIMEN PREPARATION: NORMAL
SPECIMEN PREPARATION: NORMAL
SPECIMEN SOURCE: NORMAL
SPECIMEN SOURCE: NORMAL

## 2023-06-09 PROBLEM — E87.6 HYPOKALEMIA: Status: ACTIVE | Noted: 2023-06-09

## 2023-06-09 PROBLEM — J18.9 PNEUMONIA: Status: ACTIVE | Noted: 2023-06-09

## 2023-06-09 PROBLEM — R77.8 ELEVATED TROPONIN: Status: ACTIVE | Noted: 2023-06-09

## 2023-06-09 PROBLEM — J96.21 ACUTE ON CHRONIC RESPIRATORY FAILURE WITH HYPOXIA (HCC): Status: ACTIVE | Noted: 2023-06-09

## 2023-06-09 PROBLEM — R65.21 SEPTIC SHOCK (HCC): Status: ACTIVE | Noted: 2023-06-09

## 2023-06-09 PROBLEM — G93.40 ACUTE ENCEPHALOPATHY: Status: ACTIVE | Noted: 2023-06-09

## 2023-06-09 PROBLEM — A41.9 SEPTIC SHOCK (HCC): Status: ACTIVE | Noted: 2023-06-09

## 2023-06-09 PROBLEM — E87.1 HYPONATREMIA: Status: ACTIVE | Noted: 2023-06-09

## 2023-06-12 PROBLEM — Z51.5 ENCOUNTER FOR PALLIATIVE CARE: Status: ACTIVE | Noted: 2023-06-12

## 2023-06-12 PROBLEM — Z71.89 GOALS OF CARE, COUNSELING/DISCUSSION: Status: ACTIVE | Noted: 2023-06-12

## 2023-06-14 ENCOUNTER — HOME HEALTH ADMISSION (OUTPATIENT)
Age: 73
End: 2023-06-14
Payer: MEDICARE

## 2023-06-18 ENCOUNTER — HOME CARE VISIT (OUTPATIENT)
Age: 73
End: 2023-06-18
Payer: MEDICARE

## 2023-06-18 PROCEDURE — G0299 HHS/HOSPICE OF RN EA 15 MIN: HCPCS

## 2023-06-19 VITALS
OXYGEN SATURATION: 98 % | DIASTOLIC BLOOD PRESSURE: 84 MMHG | RESPIRATION RATE: 19 BRPM | SYSTOLIC BLOOD PRESSURE: 151 MMHG | TEMPERATURE: 97.3 F | HEART RATE: 61 BPM

## 2023-06-19 ASSESSMENT — ENCOUNTER SYMPTOMS
SPUTUM AMOUNT: SCANT
COUGH: 1
SPUTUM PRODUCTION: 1
SPUTUM COLOR: YELLOW
STOOL DESCRIPTION: LOOSE
COUGH CHARACTERISTICS: DRY
DIARRHEA: 1
SPUTUM CONSISTENCY: TENACIOUS

## 2023-06-19 NOTE — HOME HEALTH
Skilled services/Home bound verification:     Skilled Reason for admission/summary of clinical condition:  acute respiratory failure with recurrent pneumonia . This patient is homebound for the following reasons Requires considerable and taxing effort to leave the home , Requires the assistance of 1 or more persons to leave the home  and Only leaves the home for medical reasons or [employer] services and are infrequent and of short duration for other reasons . Caregiver: relative. Caregiver assists with all ADL's, meal prep and shopping, transportation to medical appointments. Medications reconciled and all medications are available in the home this visit. The following education was provided regarding medications: pt's not able to get pulmicort and douneb from current pharmacy, instructed pt and caregiver to try and call a different pharmacy to see if the medications are available there too. Medications  are effective at this time. High risk medication teaching regarding anticoagulants, antiplatelets, antibiotics, antipsychotics, hyperglycemic agents, or opioid/narcotics performed (specify): aspirin 81mg taken daily. MD notified of any discrepancies/look a like medications/medication interactions none found. Home health supplies by type and quantity ordered/delivered this visit include: none    Patient education provided this visit to include: see interventions, reviewed with pt signs and symptoms of decreased blood flow to L foot, and monitoring of rash on L foot which pt is currently in treatment with podiatry and antifungal topical cream is applied. Patient level of understanding of education provided: pt will need reinforcement. Sharps Education Provided: pt doesn't have any injectables. Patient response to procedure performed:  pt will need reinforcement    Home exercise program/Homework provided: only ambulation with walker as tolerated.     Pt/Caregiver instructed on plan of

## 2023-06-19 NOTE — CASE COMMUNICATION
Pt was opened for acute respiratory failure for teaching and disease process management for frequency of 2wk1, 1wk2, 2 PRN. Pt states she was told to continue with Aspirin 81mg daily, please clarify, pt was also not able to get Pulmicort and douneb from current pharmacy. Nursing Integumentary and Medication Assessment  Question Score  M 1306  Unhealed Pressure Injury Stage 2 or higher 0 If score is 0, skip  (A-F)      A1. Stage 2  0  B1. Stage 3  0  C1. Stage 4  0  D1. Unstageable: non-removable dressing 0  E1. Unstageable: Slough/Eschar   0  F1.   Unstageable: Deep tissue injury  0  M 1322  Current number of Stage 1 Pressure Injuries 0  M 1324  Stage of Most Problematic Stageable Pressure Injury 0  M 1330  Does patient have a Stasis Ulcer 0  If score is 0, skip M 1332 and M 1334  M 1332  Current number of Observable Stasis Ulcers 0  M 1334  Statius  of Most Problematic Observable Stasis Ulcer 0   Does patient have a Surgical Wound  0 If score is 0 or 2, skip M 1342  M 1342  Status of Most Problematic Observable Surgical Wound  0  M 2001  Drug Regimen Review  0  M 2003 Medication Follow-up  0  M 2010 High Risk Drug Education 1

## 2023-06-20 ENCOUNTER — HOME CARE VISIT (OUTPATIENT)
Age: 73
End: 2023-06-20
Payer: MEDICARE

## 2023-06-22 ENCOUNTER — HOME CARE VISIT (OUTPATIENT)
Age: 73
End: 2023-06-22
Payer: MEDICARE

## 2023-06-22 PROCEDURE — G0151 HHCP-SERV OF PT,EA 15 MIN: HCPCS

## 2023-06-22 PROCEDURE — G0299 HHS/HOSPICE OF RN EA 15 MIN: HCPCS

## 2023-06-23 VITALS
RESPIRATION RATE: 18 BRPM | SYSTOLIC BLOOD PRESSURE: 110 MMHG | OXYGEN SATURATION: 99 % | DIASTOLIC BLOOD PRESSURE: 58 MMHG | HEART RATE: 60 BPM | TEMPERATURE: 97.2 F

## 2023-06-23 VITALS
OXYGEN SATURATION: 96 % | TEMPERATURE: 98.5 F | HEART RATE: 63 BPM | SYSTOLIC BLOOD PRESSURE: 118 MMHG | RESPIRATION RATE: 20 BRPM | DIASTOLIC BLOOD PRESSURE: 60 MMHG

## 2023-06-23 ASSESSMENT — ENCOUNTER SYMPTOMS
DYSPNEA ACTIVITY LEVEL: AFTER AMBULATING MORE THAN 20 FT
STOOL DESCRIPTION: SOFT FORMED

## 2023-06-23 NOTE — HOME HEALTH
PT is referred to home care for SN/PT/OT s/p hospitalization with [facility]. Pt was recently discharged from home care services with goals met after referral due to COPD exacerbation. PMHx:  Asthma      Chronic obstructive pulmonary disease ([facility] [address]      Hypertension     SUBJECTIVE: \"I'm feeling much better\"  LIVING SITUATION/PLOF: Pt lives with her son, pete in law, and grandchildren in a 2 story home with RUBENS. Pt's bedroom and bathroom are on the first floor. PTA pt was MOD I with ADL's and mobility using a FWW. CAREGIVER INVOLVEMENT/ ASSISTANCE NEEDED FOR: Transportation, meal prep, ADL's, mobility  MEDICATIONS REVIEWED AND UPDATED: Meds reviewed with no changes  NEXT MD APPT: 6/27 with PCP    EQUIPMENT: FWW, O2 concentrator, shower chair, grab bars, w/c  ROM: B LE's WFL  STRENGTH: B hips 3+/5, knees and ankles 4/5.  5 STS = 24.7 sec  WOUNDS: No wounds  BED MOBILITY: I supine<>sit. Pt uses a small step stool to push back on the bed  TRANSFERS: S sit<>stand from varied surfaces  GAIT: Pt amb in the house 48' with FWW and S.  Gait characterized by small step length and decreased foot clearance. STAIRS: NT - pt declined  BALANCE: Pt scored 15/28 on Tinetti Balance Assessment placing her at high risk for falls. HEP consisting of:  1. Walking every hour during the day with FWW and S  2. Seated: ankle pumps, LAQ, hip flexion  3. Deep breathing   Patient/caregiver verbalized understanding but will need reinforcement for consistency. PATIENT EDUCATION PROVIDED THIS VISIT: safety, HEP, walking, deep breathing    PATIENT RESPONSE TO EDUCATION PROVIDED: Pt/CG verbalizes understanding of all information and demo's back as appropriate   PATIENT RESPONSE TO EVALUATION/TREATMENT: Patient demonstrated a positive outcome post treatment and reported  increased comfort and increased confidence with transfers and mobility.  Patient reported good understanding of the HEP and reports confidence in ability to complete the

## 2023-06-24 NOTE — HOME HEALTH
Skilled reason for visit: Skilled Registered Nurse Assessment, Pt Education, Provide Wound Care    Caregiver involvement: CG provides assistance with all ADLs, Meds, Transportation as needed    Medications reviewed and all medications are available in the home this visit. The following education was provided regarding medications:  Pt and CG instructed on Indication, Dosage, Schedule, S/E, Interactions of medications as listed in profile. MD notified of any discrepancies/look a-like medications/medication interactions: n/a    Home health supplies by type and quantity ordered/delivered this visit include: none    Patient education provided this visit: Pt and CG educated on Health Maintenance, Management of Disease Processes, Preventative Health Practices and Safety    Sharps education provided: n/a    Patient level of understanding of education provided: Verbalizes understanding of most meds, safety, wound healing    Skilled Care Performed this visit: Skilled Registered Nurse Assessment, Pt Education, Provide Wound Care    Patient response to procedure performed:  tolerated well    Patient's Progress towards personal goals: Verbalizes slow but sure progress in education and wound healing    Home exercise program: as ariana    Continued need for the following skills: Nursing and Physical Therapy. Plan for next visit: Continue to Assess, Educate Pt and CG and Provide Wound Care    Patient and/or caregiver notified and agrees to changes in the Plan of Care: N/A.      The following discharge planning was discussed with the pt/caregiver: Discharge when pt goals are met

## 2023-06-26 ENCOUNTER — HOME CARE VISIT (OUTPATIENT)
Age: 73
End: 2023-06-26
Payer: MEDICARE

## 2023-06-28 ENCOUNTER — HOME CARE VISIT (OUTPATIENT)
Age: 73
End: 2023-06-28
Payer: MEDICARE

## 2023-06-28 VITALS
TEMPERATURE: 97.7 F | RESPIRATION RATE: 14 BRPM | HEART RATE: 86 BPM | OXYGEN SATURATION: 99 % | DIASTOLIC BLOOD PRESSURE: 62 MMHG | SYSTOLIC BLOOD PRESSURE: 135 MMHG

## 2023-06-28 PROCEDURE — G0157 HHC PT ASSISTANT EA 15: HCPCS

## 2023-06-29 ENCOUNTER — HOME CARE VISIT (OUTPATIENT)
Age: 73
End: 2023-06-29
Payer: MEDICARE

## 2023-06-29 VITALS
RESPIRATION RATE: 18 BRPM | DIASTOLIC BLOOD PRESSURE: 82 MMHG | SYSTOLIC BLOOD PRESSURE: 139 MMHG | OXYGEN SATURATION: 96 % | HEART RATE: 64 BPM | TEMPERATURE: 97.4 F

## 2023-06-29 PROCEDURE — G0299 HHS/HOSPICE OF RN EA 15 MIN: HCPCS

## 2023-06-29 ASSESSMENT — ENCOUNTER SYMPTOMS: DYSPNEA ACTIVITY LEVEL: AT REST

## 2023-06-30 ENCOUNTER — HOME CARE VISIT (OUTPATIENT)
Age: 73
End: 2023-06-30
Payer: MEDICARE

## 2023-06-30 PROCEDURE — G0157 HHC PT ASSISTANT EA 15: HCPCS

## 2023-07-02 VITALS
DIASTOLIC BLOOD PRESSURE: 74 MMHG | RESPIRATION RATE: 15 BRPM | SYSTOLIC BLOOD PRESSURE: 123 MMHG | HEART RATE: 72 BPM | OXYGEN SATURATION: 96 % | TEMPERATURE: 98 F

## 2023-07-03 ENCOUNTER — HOME CARE VISIT (OUTPATIENT)
Age: 73
End: 2023-07-03
Payer: MEDICARE

## 2023-07-03 VITALS
RESPIRATION RATE: 17 BRPM | DIASTOLIC BLOOD PRESSURE: 96 MMHG | HEART RATE: 78 BPM | TEMPERATURE: 98.5 F | OXYGEN SATURATION: 96 % | SYSTOLIC BLOOD PRESSURE: 162 MMHG

## 2023-07-03 PROCEDURE — G0152 HHCP-SERV OF OT,EA 15 MIN: HCPCS

## 2023-07-05 ENCOUNTER — HOME CARE VISIT (OUTPATIENT)
Age: 73
End: 2023-07-05
Payer: MEDICARE

## 2023-07-05 VITALS
HEART RATE: 62 BPM | RESPIRATION RATE: 16 BRPM | OXYGEN SATURATION: 98 % | TEMPERATURE: 97.9 F | DIASTOLIC BLOOD PRESSURE: 85 MMHG | SYSTOLIC BLOOD PRESSURE: 167 MMHG

## 2023-07-05 PROCEDURE — G0157 HHC PT ASSISTANT EA 15: HCPCS

## 2023-07-06 ENCOUNTER — HOME CARE VISIT (OUTPATIENT)
Age: 73
End: 2023-07-06
Payer: MEDICARE

## 2023-07-06 PROCEDURE — G0299 HHS/HOSPICE OF RN EA 15 MIN: HCPCS

## 2023-07-07 ENCOUNTER — HOME CARE VISIT (OUTPATIENT)
Age: 73
End: 2023-07-07
Payer: MEDICARE

## 2023-07-07 VITALS
OXYGEN SATURATION: 95 % | SYSTOLIC BLOOD PRESSURE: 117 MMHG | RESPIRATION RATE: 18 BRPM | TEMPERATURE: 97.4 F | DIASTOLIC BLOOD PRESSURE: 71 MMHG | HEART RATE: 55 BPM

## 2023-07-07 VITALS
RESPIRATION RATE: 16 BRPM | DIASTOLIC BLOOD PRESSURE: 73 MMHG | SYSTOLIC BLOOD PRESSURE: 136 MMHG | OXYGEN SATURATION: 97 % | TEMPERATURE: 97.6 F | HEART RATE: 64 BPM

## 2023-07-07 PROCEDURE — G0157 HHC PT ASSISTANT EA 15: HCPCS

## 2023-07-07 ASSESSMENT — ENCOUNTER SYMPTOMS
DYSPNEA ACTIVITY LEVEL: AT REST
DYSPNEA ACTIVITY LEVEL: AFTER AMBULATING LESS THAN 10 FT

## 2023-07-07 NOTE — HOME HEALTH
Skilled reason for visit: Patient was recently hospitalized for ARF, requiring observation by a SN for s/s of decomposition or adverse effects resulting from newly prescribed medications. Skilled observation needed to determine if new medication regimen prescribed requires modifications or other therapeutic interventions considered until pt's clinical condition or treatment has stabilized. Caregiver involvement: Patient lives with   . Caregiver assists patient with meal prep and setup, medication management, grocery shopping, household chores, transportation to MD appointment and home exercise program.  Medications reconciled and all medications are available in the home this visit. The following education was provided regarding medications, medication interactions, and look alike medications:  Report medication questions or problems to Rosalba Sapp or MD.  Medications are effective currently. Patient states understanding. Patient education provided this visit:  Reconciled all meds with patient on discharge, all questions addressed and answered. Sharps education performed: NA  Patient level of understanding of education provided: Pt verbalized understanding of all education and repeated back teaching  Skilled Care Performed this visit: Disease process teaching, medication teaching, physical assessment and monitoring  Patient response to procedure performed:  No procedure performed, SN teaching visit  Progress toward goals: Goals/teaching completed. Patient to follow up with any questions or concerns with PCP  Home exercise program: PT  Continued need for the following skills: PT  Patient and/or caregiver notified and agrees to changes in the Plan of Care YES  The following discharge planning was discussed with the pt/caregiver: SN discharge with teaching and goals met.   Patient states understanding of patient's meds, precautions and interactions    SN Discipline Discharge on 7/6/23 with goals met

## 2023-07-09 PROBLEM — R77.8 ELEVATED TROPONIN: Status: RESOLVED | Noted: 2023-06-09 | Resolved: 2023-07-09

## 2023-07-10 ENCOUNTER — HOME CARE VISIT (OUTPATIENT)
Age: 73
End: 2023-07-10
Payer: MEDICARE

## 2023-07-10 VITALS
HEART RATE: 72 BPM | OXYGEN SATURATION: 96 % | RESPIRATION RATE: 18 BRPM | TEMPERATURE: 98 F | SYSTOLIC BLOOD PRESSURE: 128 MMHG | DIASTOLIC BLOOD PRESSURE: 71 MMHG

## 2023-07-10 PROCEDURE — G0151 HHCP-SERV OF PT,EA 15 MIN: HCPCS

## 2023-07-10 ASSESSMENT — ENCOUNTER SYMPTOMS
DYSPNEA ACTIVITY LEVEL: AFTER AMBULATING LESS THAN 10 FT
PAIN LOCATION - PAIN QUALITY: ACHE

## 2023-07-11 NOTE — HOME HEALTH
S: Patient stated she was doing well and reports 80% overall subjective improvement rating. O: PAIN: see pain tab   WOUND:no open wounds noted or reported. ROM: no impairments noted   STRENGTH: WFL, FTSTS goal met   BED MOBILITY: I   EQUIPMENT: FWW, O2  TRANSFERS: MI  GAIT: MI with FWW and good reciprocal gait pattern  STEPS: MI   BALANCE: Tinetti 23/28 and patient has been free from fall   A:ASSESSMENT AND PROGRESS TOWARD GOALS:  Rickey Burr received skilled PT, OT and RN s/p hospitalization for ARF. This patient has currently met all goals and has been discharged to a HEP under the care and supervision of her family and PCP at this time. The following goals have been met: Tinetti 23/28, FTSTS 20 seconds and pt is MI with ambulating >300 ft with a FWW and has been instructed in a daily HEP. Patient verbalized understanding of all discharge instructions and is in agreement with discharge this visit. P:DC      Discharge medication list reconciled with patient and caregiver. Questions regarding medications answered and patient/caregiver advised to refer to MD for any medication questions after discharge. 2. Patient to continue use of the following assistive device for maximum safety: FWW, O2  3. Today's treatment included: review of therapeutic exercise program, reassessment of mobility, transfers, balance and gait. 4. Patient and caregiver demonstrate understanding of DC instructions and repeat verbalization. Patient and caregiver given written copy of instructions. 5. Patient and caregiver given notification of discharge and in agreement with DC this date. 6. MD notified of discharge.

## 2023-07-11 NOTE — CASE COMMUNICATION
Alia Camargo received skilled PT, OT and RN s/p hospitalization for ARF. This patient has currently met all goals and has been discharged to a HEP under the care and supervision of her family and PCP at this time. The following goals have been met: Tinetti 23/28, FTSTS 20 seconds and pt is MI with ambulating >300 ft with a FWW and has been instructed in a daily HEP. Patient verbalized understanding of all discharge instructions and i s in agreement with discharge this visit.

## (undated) DEVICE — SYRINGE 20ML LL S/C 50

## (undated) DEVICE — SET ADMIN L104IN 20 GTT GRAV RLER CLMP SMRT SITE NDL FREE

## (undated) DEVICE — BLADE, TONGUE, 6", STERILE: Brand: MEDLINE

## (undated) DEVICE — GARMENT,MEDLINE,DVT,INT,CALF,LG, GEN2: Brand: MEDLINE

## (undated) DEVICE — ADAPTER TBNG DIA15MM SWVL FBROPT BRONCHSCP TERM 2 AXIS PEEP

## (undated) DEVICE — APPLICATOR FBR TIP L6IN COT TIP WOOD SHFT SWAB 2000 PER CA

## (undated) DEVICE — SYRINGE MED 10ML SLIP TIP BLNT FILL AND LUERLOCK DISP

## (undated) DEVICE — SYSTEM SPUT COLL 50ML CONIC CNTRFUG TB CONT W/ DURABLE 3

## (undated) DEVICE — SINGLE USE SUCTION VALVE MAJ-209: Brand: SINGLE USE SUCTION VALVE (STERILE)

## (undated) DEVICE — GARMENT,MEDLINE,DVT,INT,CALF,MED, GEN2: Brand: MEDLINE

## (undated) DEVICE — CATHETER SUCT TR FL TIP 14FR W/ O CTRL

## (undated) DEVICE — NEBULIZER,KIT,T-MOUTHPIECE,6"RESER,7'TUB: Brand: MEDLINE

## (undated) DEVICE — TOWEL,OR,DSP,ST,BLUE,STD,4/PK,20PK/CS: Brand: MEDLINE

## (undated) DEVICE — SLEEVE COMPR STD 12 IN FOR 165IN CALF COMFORT VENODYNE SYS

## (undated) DEVICE — TUBING, SUCTION, 1/4" X 12', STRAIGHT: Brand: MEDLINE

## (undated) DEVICE — SYRINGE MED 3ML NDL 22GA L1 1/2IN REG BVL SFGLDE

## (undated) DEVICE — MASK SURG REG ORNG LEV 3 SFTY SEAL 4 LAYR SFT INNR LINING

## (undated) DEVICE — SINGLE USE BIOPSY VALVE MAJ-210: Brand: SINGLE USE BIOPSY VALVE (STERILE)

## (undated) DEVICE — BITE BLOCK ENDOSCP UNIV AD 6 TO 9.4 MM

## (undated) DEVICE — BE 105-8 BRONCHOSCOPE SWIVEL - 15MM ID/22MM OD (PATIENT PORT) X15MM OD (EQUIPMENT PORT). REUSABLE.  FITS COMPONENTS OF ADULT VENTILATOR CIRCUITS.  MOLDED OF POLYETHERIMIDE. INCLUDES TWO SILICONE RUBBER CAPS; ONE CAP ALLOWS FOR THE USE OF A SUCTIONING CATHETER WHILE THE OTHER CAP ALLOWS FOR THE USE OF A FIBER-OPTIC BRONCHOSCOPE WITHOUT SIGNIFICANT LOSS OF PEEP.: Brand: BE 105-8 BRONCHOSCOPE SWIVEL

## (undated) DEVICE — 1860S HEALTH CARE RESPIRATOR N95 120EA/C: Brand: 3M™

## (undated) DEVICE — 1860 HEALTH CARE N95 MASK, 20EACH/BOX  6 BX/C: Brand: 3M™

## (undated) DEVICE — GLOVE ORANGE PI 7 1/2   MSG9075

## (undated) DEVICE — BASIN EMSIS 16OZ GRAPHITE PLAS KID SHP MOLD GRAD FOR ORAL

## (undated) DEVICE — YANKAUER,SMOOTH HANDLE,HIGH CAPACITY: Brand: MEDLINE INDUSTRIES, INC.

## (undated) DEVICE — LINER SUCT CANSTR 3000CC PLAS SFT PRE ASSEMB W/OUT TBNG W/

## (undated) DEVICE — GOWN PLASTIC FILM THMBHKS UNIV BLUE: Brand: CARDINAL HEALTH